# Patient Record
Sex: FEMALE | Race: WHITE | Employment: OTHER | URBAN - METROPOLITAN AREA
[De-identification: names, ages, dates, MRNs, and addresses within clinical notes are randomized per-mention and may not be internally consistent; named-entity substitution may affect disease eponyms.]

---

## 2017-02-13 ENCOUNTER — TELEPHONE (OUTPATIENT)
Dept: INTERNAL MEDICINE CLINIC | Age: 62
End: 2017-02-13

## 2017-02-13 DIAGNOSIS — E83.52 HYPERCALCEMIA: Primary | ICD-10-CM

## 2017-02-13 NOTE — TELEPHONE ENCOUNTER
----- Message from Kassie Copeland MD sent at 2/12/2017  5:41 PM EST -----  Regarding: acs  Call- lab from Dr Roderick Cantu show elevated calcium , this has been gradually creeping up. She should come in for PTH level, dx hypercalcemia      Advised pt Dr Chantelle Miller has reviewed her lab from Dr Roderick Cantu - show elevated calcium which has been gradually creeping up. She should come in for PTH level. Pt requested I fax lab slip to [address] [place] [address] in [place]. Called [facility] in [place] ([phone]) - was given fax number ([phone]). Lab slip faxed - confirmation received. Pt is aware this has been done. She will go today to have labs done.

## 2017-02-16 ENCOUNTER — TELEPHONE (OUTPATIENT)
Dept: INTERNAL MEDICINE CLINIC | Age: 62
End: 2017-02-16

## 2017-02-16 DIAGNOSIS — E83.52 SERUM CALCIUM ELEVATED: Primary | ICD-10-CM

## 2017-02-21 ENCOUNTER — OFFICE VISIT (OUTPATIENT)
Dept: ENDOCRINOLOGY | Age: 62
End: 2017-02-21

## 2017-02-21 VITALS
DIASTOLIC BLOOD PRESSURE: 76 MMHG | HEIGHT: 64 IN | BODY MASS INDEX: 29.74 KG/M2 | WEIGHT: 174.2 LBS | OXYGEN SATURATION: 97 % | SYSTOLIC BLOOD PRESSURE: 128 MMHG | RESPIRATION RATE: 16 BRPM | HEART RATE: 75 BPM

## 2017-02-21 DIAGNOSIS — E83.52 HYPERCALCEMIA: Primary | ICD-10-CM

## 2017-02-21 DIAGNOSIS — M81.0 OSTEOPOROSIS: ICD-10-CM

## 2017-02-21 RX ORDER — LANOLIN ALCOHOL/MO/W.PET/CERES
1000 CREAM (GRAM) TOPICAL DAILY
COMMUNITY
End: 2020-05-05

## 2017-02-21 NOTE — PROGRESS NOTES
Endocrinology New Patient Visit    Chief Complaint: hypercalcemia    Referring provider:  Delonte Torres MD    History of Present Illness:  Deysi Keller is a 64 y.o. female with h/o obesity s/p gastric bypass and osteoporosis who was referred for hypercalcemia. Review of past labs show she has had elevated serum calcium levels dating back to 2014. PTH level has not been checked. She does have a history of vitamin D deficiency following her Polo-en-Y surgery in 2000 at [facility]. She was also diagnosed with osteoporosis based on a DXA in 2014 and received her first infusion of Reclast in May 2016. She denies recent fractures but has fractured both shoulders (possibly humeral) s/p ORIF after mechanical falls. Risk factors for osteoporosis include premature menopause - underwent a DEYSI in her early 35s for early stage cervical cancer. She denies any noticeable symptoms related to the high calcium levels. Reports fatigue which seems to be worsening. Denies abdominal pain or h/o nephrolithiasis. Admits to some situational depression due to the death of her  a few years ago. Has a good support system and feels she is dealing with grief appropriately. Denies family history of hypercalcemia or hyperparathyroidism. She does not take thiazide diuretics. No personal history of thyroid dysfunction. Weight is stable, about 100 lbs less than it was prior to her gastric bypass. She has been taking ergocalciferol 50K IU weekly but was recently advised to hold this due to hypercalcemia. She was taking calcium supplements following her gastric bypass but stopped this years ago. She has a history of lactose intolerance so rarely eats ice cream or milk. Has cheese on occasion.     Review of Systems:   General ROS: positive for  - fatigue  Ophthalmic ROS: negative  ENT ROS: negative  Endocrine ROS: negative  Respiratory ROS: no cough, shortness of breath, or wheezing  Cardiovascular ROS: no chest pain or dyspnea on exertion  Gastrointestinal ROS: no abdominal pain, change in bowel habits, or black or bloody stools  Genito-Urinary ROS: no dysuria, trouble voiding, or hematuria  Musculoskeletal ROS: positive for occasional back pain  Neurological ROS: negative  Dermatological ROS: negative    Problem List:  Patient Active Problem List   Diagnosis Code    Morbid obesity ([facility] E66.01    Closed fracture of left proximal humerus S42.202A    Post op infection T81. 4XXA    Cellulitis of shoulder L03.119    Insomnia, unspecified G47.00    Grief reaction F43.20    Pernicious anemia D51.0    Unspecified vitamin D deficiency E55.9    Reflux esophagitis K21.0    Gastric ulcer K25.9    Symptomatic menopausal or female climacteric states N95.1    Osteoporosis M81.0    Iron deficiency anemia, unspecified D50.9    Onychomycosis B35.1    Active advance directive on file Z78.9    Hypercalcemia E83.52       Past Medical History:    Past Medical History   Diagnosis Date    Arthritis      OSTEO    Chronic pain      LT. ARM    GERD (gastroesophageal reflux disease)     History of blood transfusion 1995     LOLY, NO REACTION; needed transfusion from surgery-ovarian cyst and hit artery per pt.     Infection      left shoulder    Morbid obesity ([facility] 3/23/2011    PUD (peptic ulcer disease)     Reflux 3/23/2011       Past Surgical History:  Past Surgical History   Procedure Laterality Date    Pr leg/ankle surgery proc unlisted       right ankle cartilage    Pr biopsy/excision, lymph node(s)      Pr leg/ankle surgery proc unlisted  2009      ankle    Hx carpal tunnel release  2001     RIGHT    Hx tonsil and adenoidectomy       AGE7    Hx hysterectomy  1993    Hx tubal ligation  1981    Pr breast surgery procedure unlisted       LT. TUMOR BENIGN REMOVED AGE 18    Hx heent  1995     TUMOR REMOVED THROAT    Hx adenoidectomy      Hx tonsillectomy      Hx gi       COLONOSCOPY    Hx abdominoplasty  2006    Hx gastric bypass  12-13-00     dr. Vina Boast Hx cholecystectomy  2000    Hx other surgical       TUMOR REMOVED RIGHT UPPER FLANK AREA    Hx orthopaedic       CYCST REMOVED BASE OF SPINE AGE 23    Hx orthopaedic  2004,2009     RIGHT ANKLE    Hx orthopaedic  4/2012     LT. SHOULDER AND HUMERUS FX, SCREW AND PLATE    Hx colonoscopy       2014, due 17 vs 19       Social History:  Social History     Social History    Marital status:      Spouse name: N/A    Number of children: N/A    Years of education: N/A     Occupational History    Not on file. Social History Main Topics    Smoking status: Current Every Day Smoker     Packs/day: 0.50     Years: 7.00     Types: Cigarettes    Smokeless tobacco: Never Used    Alcohol use 2.4 oz/week     3 Cans of beer, 1 Glasses of wine per week      Comment: 6 PACK/every 2 weeks    Drug use: No    Sexual activity: Not on file     Other Topics Concern    Not on file     Social History Narrative       Family History:  Family History   Problem Relation Age of Onset    Other Mother      hypoglycemia, obese    Cancer Mother      LUNG    Lung Disease Mother     Cancer Father      GENERALIZED    Asthma Sister     Lung Disease Sister     Hypertension Brother     Lung Disease Brother     Diabetes Brother     Thyroid Disease Brother     Heart Disease Maternal Aunt     Cancer Maternal Uncle      UNKNOWN    Cancer Paternal Aunt      BREAST    Cancer Paternal Uncle     Heart Disease Maternal Grandmother     Heart Disease Maternal Grandfather     Heart Disease Paternal Grandmother     Heart Disease Paternal Grandfather     Cancer Paternal Uncle     Lung Disease Brother        Medications:     Current Outpatient Prescriptions:     cyanocobalamin (VITAMIN B-12) 1,000 mcg tablet, Take 1,000 mcg by mouth daily. , Disp: , Rfl:     esomeprazole (NEXIUM) 40 mg capsule, Take 1 Cap by mouth two (2) times a day.  Per dr Stephanie Hays: 1 Cap, Rfl: 0    ferrous sulfate 325 mg (65 mg iron) tablet, Take  by mouth Daily (before breakfast). , Disp: , Rfl:     cyanocobalamin, vitamin B-12, 1,000 mcg/mL kit, 1 mL by Injection route every thirty (30) days. , Disp: 10 mL, Rfl: 3    syringe with needle, disp, (SYRINGE 3CC/58SR3-5/2\") 3 mL 21 x 1 1/2\" syrg, Use for injecting B-12 IM once month., Disp: 4 Pen Needle, Rfl: 3    ergocalciferol (VITAMIN D2) 50,000 unit capsule, TAEK 1 CAPSULE BY MOUTH EVERY 7 DAYS, Disp: 4 Cap, Rfl: 11    Allergies: Allergies   Allergen Reactions    Codeine Hives     Tolerates Dilaudid       Physical Examination:  Visit Vitals    /76    Pulse 75    Resp 16    Ht 5' 4\" (1.626 m)    Wt 174 lb 3.2 oz (79 kg)    SpO2 97%    BMI 29.9 kg/m2       Gen: no acute distress  HEENT: mucous membranes moist, fair dentition  Thyroid: no enlargement or nodules noted  CAD: normal rate, regular rhythm. No murmur rubs or gallops  PULM: clear to ausculation, no wheezes, rhonchis or rales.   Back: no kyphosis or scoliosis, no paravertebral tenderness  EXT: no clubbing, cyanosis or edema  Neuro: grossly non focal, normal DTRs  Psych: pleasant, good insight into medical hx      Clinical Data Review:    Lab Results   Component Value Date/Time    Sodium 142 04/18/2016 10:03 AM    Potassium 4.9 04/18/2016 10:03 AM    Chloride 103 04/18/2016 10:03 AM    CO2 23 04/18/2016 10:03 AM    Anion gap 4 04/10/2013 05:05 AM    Glucose 91 04/18/2016 10:03 AM    BUN 9 04/18/2016 10:03 AM    Creatinine 0.53 04/18/2016 10:03 AM    BUN/Creatinine ratio 17 04/18/2016 10:03 AM    GFR est  04/18/2016 10:03 AM    GFR est non- 04/18/2016 10:03 AM    Calcium 10.7 04/18/2016 10:03 AM       DXA May 2014  Findings:  Fractures identified on Lateral scanogram: None     Lumbar spine: L1-L4  Bone mineral density (gm/cm2): 0.786  % of peak bone mass: 66  % for age matched controls: 79  T score: -3.3  Z score: -2.8     Hip: Left femoral neck  Bone mineral density (gm/cm2): 0.771  % of peak bone mass: 75  % for age matched controls: 80  T score: -1.9  Z score: -1.1     Hip: Right femoral neck  Bone mineral density (gm/cm2): 0.726  % of peak bone mass: 70  % for age matched controls: 66  T score: -2.2  Z score: -1.5     Left forearm  Bone mineral density (gm/cm2): 0.578  % of peak bone mass: 65  % for age matched controls: 70  T score: -3.5  Z score: -2.7      IMPRESSION:  This patient is osteoporotic using the World Health Organization criteria  A direct comparison to last exam cannot be made due to differences in   machinery. There has likely been a significant decrease in bone mineral   density. 10 year probability of major osteoporotic fracture: 17%  10 year probability of hip fracture: 4.2%    Assessment and Plan:  Patient is a 64 y.o. female here for hypercalcemia. She most likely has hyperparathyroidism - will check PTH level today to confirm. If normal or elevated, plan to pursue US with Sestamibi to identify a candidate gland for parathyroidectomy. Surgical indication would be her metabolic bone disease (DXA is concerning for primary hyperparathyroidism given the most negative T-score at the distal 1/3 radius). I would not recommend additional zolindronic acid treatments due to potential for hungry bone syndrome if she does have surgically correctable primary hyperparathyroidism. Will obtain 24h urine for calcium/creatinine to rule out familial hypocalciuric hypercalcemia although this is a rare cause of hypercalcemia. Check vitamin D and will advise on appropriate low-dose vitamin D supplementation (continue to hold ergocalciferol for now). Orders today:  Orders Placed This Encounter    TSH AND FREE T4    PTH INTACT    METABOLIC PANEL, COMPREHENSIVE    VITAMIN D, 25 HYDROXY     Patient verbalized an understanding and will return to clinic in 3 months.    I spent 45 minutes with the patient today and > 50% of the time was spent counseling the patient about causes of hypercalcemia, work-up and potential treatment. Thank you for the opportunity to participate in this patient's care. Adithya Riley MD  [facility]  [facility]    ------------------------------------------------------------------------  [facility] 2/22/17:    PTH is inappropriately normal. Vid D at goal. Proceed with US and Sestamibi. Lab Results   Component Value Date/Time    Calcium 10.3 02/21/2017 02:39 PM    PTH, Intact 63 02/21/2017 02:39 PM     Lab Results   Component Value Date/Time    Vitamin D 25-Hydroxy 14.2 04/15/2011 10:35 AM    VITAMIN D, 25-HYDROXY 33.7 02/21/2017 02:39 PM       ------------------------------------------------------------------------  ADDENDUM 3/8/17:    Urine calcium normal, no evidence of [address]. Sestamibi failed to identify a candidate. Given her osteoporosis, I would still like her to be evaluated by Dr Garret Sears for possible 4 gland exploration. Component      Latest Ref Rng & Units 2/27/2017 2/27/2017          12:00 AM 12:00 AM   Creatinine, urine      Not Estab. mg/dL  30.7   Creatinine,urine 24 hr      800 - 1800 mg/24 hr  675 (L)   Calcium,urine mg/dL      Not Estab. mg/dL 8.3    Calcium mg/24 hr      100.0 - 300.0 mg/24 hr 182.6        NM Parathyroid Scan  FINDINGS:  The initial images demonstrate physiologic tracer uptake in the salivary glands,  thyroid, and myocardium.     The delayed images demonstrate no abnormal tracer activity in the neck or chest  to suggest parathyroid adenoma.     IMPRESSION:  No evidence of parathyroid adenoma. Thyroid/Parathyroid US  FINDINGS: The right thyroid lobe measures 4.4 x 2.0 x 1.5 cm. The left thyroid  lobe measures 4.1 x 1.6 x 1.1 cm. The isthmus measures 0.2 cm in AP thickness.     The thyroid gland is normal in size, echogenicity, and vascularity. No thyroid  nodule or calcification is demonstrated. No normal or abnormal parathyroid gland  is identified.     IMPRESSION:   Normal thyroid ultrasound.

## 2017-02-21 NOTE — MR AVS SNAPSHOT
Visit Information Date & Time Provider Department Dept. Phone Encounter #  
 2/21/2017  1:30 PM Yamini Tam, [address] [facility] [phone] [id number] Follow-up Instructions Return in about 3 months (around 5/21/2017). Upcoming Health Maintenance Date Due Pneumococcal 19-64 Medium Risk (1 of 1 - PPSV23) 6/15/1974 INFLUENZA AGE 9 TO ADULT 8/1/2016 BREAST CANCER SCRN MAMMOGRAM 9/16/2016 PAP AKA CERVICAL CYTOLOGY 4/1/2018 COLONOSCOPY 6/1/2020 DTaP/Tdap/Td series (2 - Td) 4/7/2025 Allergies as of 2/21/2017  Review Complete On: 2/21/2017 By: Jeanie Tejada Severity Noted Reaction Type Reactions Codeine  03/23/2011    Hives Tolerates Dilaudid Current Immunizations  Reviewed on 5/2/2016 Name Date Influenza Vaccine 10/1/2015 Influenza Vaccine Split 11/9/2012 10:52 AM  
 Tdap 4/7/2015 Zoster Vaccine, Live 4/23/2016 Not reviewed this visit You Were Diagnosed With   
  
 Codes Comments Hypercalcemia    -  Primary ICD-10-CM: Z60.47 
ICD-9-CM: 275.42 Osteoporosis     ICD-10-CM: M81.0 ICD-9-CM: 733.00 Vitals BP Pulse Resp Height(growth percentile) Weight(growth percentile) SpO2  
 128/76 75 16 5' 4\" (1.626 m) 174 lb 3.2 oz (79 kg) 97% BMI OB Status Smoking Status 29.9 kg/m2 Hysterectomy Current Every Day Smoker Vitals History BMI and BSA Data Body Mass Index Body Surface Area  
 29.9 kg/m 2 1.89 m 2 Preferred Pharmacy Pharmacy Name Phone [facility] [address], [address] [facility] [address] [phone] Your Updated Medication List  
  
   
This list is accurate as of: 2/21/17  2:15 PM.  Always use your most recent med list.  
  
  
  
  
 * VITAMIN B-12 1,000 mcg tablet Generic drug:  cyanocobalamin Take 1,000 mcg by mouth daily. * cyanocobalamin (vitamin B-12) 1,000 mcg/mL Kit 1 mL by Injection route every thirty (30) days. ergocalciferol 50,000 unit capsule Commonly known as:  VITAMIN D2  
TAEK 1 CAPSULE BY MOUTH EVERY 7 DAYS  
  
 esomeprazole 40 mg capsule Commonly known as:  NexIUM Take 1 Cap by mouth two (2) times a day. Per dr Reinaldo Jensen  
  
 ferrous sulfate 325 mg (65 mg iron) tablet Take  by mouth Daily (before breakfast). syringe with needle 3 mL 21 gauge x 1 1/2\" Syrg Commonly known as:  SYRINGE 3CC/07NL7-8/2\" Use for injecting B-12 IM once month. * Notice: This list has 2 medication(s) that are the same as other medications prescribed for you. Read the directions carefully, and ask your doctor or other care provider to review them with you. We Performed the Following CALCIUM, UR, 24 HR [84265 CPT(R)] CREATININE, UR, 24 HR [17218 CPT(R)] METABOLIC PANEL, COMPREHENSIVE [96333 CPT(R)] PTH INTACT [68341 CPT(R)] TSH AND FREE T4 [28246 CPT(R)] VITAMIN D, 25 HYDROXY [id number] CPT(R)] Follow-up Instructions Return in about 3 months (around 5/21/2017). Patient Instructions If your parathyroid level is high, I will order a parathyroid scan and ultrasound. Please complete the 24 hour urine at your earliest convenience and follow the instructions below. Instructions for 24 hour urine 1) Wake up in the morning and let the first void of the morning go into the toilet. 2) Then collect EVERY time you go to the bathroom into the jug and keep in the refrigerator. 3) The next morning COLLECT the very first sample into the jug and then bring it to the lab. If you are having difficulty activating or accessing your [employer] account, please call the [address] [place], [place] [email or web address] at [phone]. 
 
============================================================================ Hyperparathyroidism: Care Instructions Your Care Instructions Hyperparathyroidism means that your parathyroid glands are too active. These are tiny glands in the neck. They sit behind the thyroid gland. They make a hormone that helps control how much calcium is in the blood. When these glands make too much hormone, the amount of calcium in the blood goes up. Most people with this problem have no symptoms. But it can cause constipation, nausea, vomiting, fatigue, and depression. It also can lead to high blood pressure, ulcers, kidney stones, and weak bones. This problem often is caused by a tumor on the parathyroid glands. The tumor usually is not cancer. You may need surgery to take out one or more of the glands. Follow-up care is a key part of your treatment and safety. Be sure to make and go to all appointments, and call your doctor if you are having problems. It's also a good idea to know your test results and keep a list of the medicines you take. How can you care for yourself at home? · Take your medicines exactly as prescribed. Call your doctor if you think you are having a problem with your medicine. You will get more details on the specific medicines your doctor prescribes. · You will need to see your doctor regularly to check your condition. You also will have tests to check the level of calcium in your blood and to make sure your kidneys are working well. · Drink plenty of fluids (enough so that your urine is light yellow or clear like water) to prevent dehydration. Choose water and other caffeine-free clear liquids. If you have kidney, heart, or liver disease and have to limit fluids, talk with your doctor before you increase the amount of fluids you drink. · Get at least 30 minutes of exercise on most days of the week. Walking is a good choice. You also may want to do other activities, such as running, swimming, cycling, or playing tennis or team sports. · If you are taking any diuretic medicines or calcium supplements, talk to your doctor about whether you should keep taking them. When should you call for help? Call 911 anytime you think you may need emergency care. For example, call if: 
· You passed out (lost consciousness). Call your doctor now or seek immediate medical care if: 
· You are confused or have trouble thinking. · Your vomiting and nausea do not go away with treatment. Watch closely for changes in your health, and be sure to contact your doctor if: 
· You get weaker and more tired even after treatment. · You feel depressed or have aches and pains. · You are constipated. · You have increased thirst and urination. · You do not feel hungry. · You do not get better as expected. Where can you learn more? Go to http://melody-kate.info/. Enter D624 in the search box to learn more about \"Hyperparathyroidism: Care Instructions. \" Current as of: July 28, 2016 Content Version: 11.1 © 2792-7179 [employer]. Care instructions adapted under license by [employer] (which disclaims liability or warranty for this information). If you have questions about a medical condition or this instruction, always ask your healthcare professional. [address] any warranty or liability for your use of this information. Introducing [facility]! Dear Eva Oconnor: Thank you for requesting a Gig[employer] account. Our records indicate that you already have an active Gig[employer] account. You can access your account anytime at [email or web address] Did you know that you can access your hospital and ER discharge instructions at any time in Gig[employer]? You can also review all of your test results from your hospital stay or ER visit. Additional Information If you have questions, please visit the Frequently Asked Questions section of the [employer] website at [email or web address] Remember, [employer] is NOT to be used for urgent needs. For medical emergencies, dial 911. Now available from your iPhone and Android! Please provide this summary of care documentation to your next provider. Your primary care clinician is listed as MARTINEZ HANCOCK. If you have any questions after today's visit, please call [phone].

## 2017-02-21 NOTE — PATIENT INSTRUCTIONS
If your parathyroid level is high, I will order a parathyroid scan and ultrasound. Please complete the 24 hour urine at your earliest convenience and follow the instructions below. Instructions for 24 hour urine     1) Wake up in the morning and let the first void of the morning go into the toilet. 2) Then collect EVERY time you go to the bathroom into the jug and keep in the refrigerator. 3) The next morning COLLECT the very first sample into the jug and then bring it to the lab. If you are having difficulty activating or accessing your [employer] account, please call the [employer] [place] [employer] at [phone].    ============================================================================     Hyperparathyroidism: Care Instructions  Your Care Instructions  Hyperparathyroidism means that your parathyroid glands are too active. These are tiny glands in the neck. They sit behind the thyroid gland. They make a hormone that helps control how much calcium is in the blood. When these glands make too much hormone, the amount of calcium in the blood goes up. Most people with this problem have no symptoms. But it can cause constipation, nausea, vomiting, fatigue, and depression. It also can lead to high blood pressure, ulcers, kidney stones, and weak bones. This problem often is caused by a tumor on the parathyroid glands. The tumor usually is not cancer. You may need surgery to take out one or more of the glands. Follow-up care is a key part of your treatment and safety. Be sure to make and go to all appointments, and call your doctor if you are having problems. It's also a good idea to know your test results and keep a list of the medicines you take. How can you care for yourself at home? · Take your medicines exactly as prescribed. Call your doctor if you think you are having a problem with your medicine. You will get more details on the specific medicines your doctor prescribes.   · You will need to see your doctor regularly to check your condition. You also will have tests to check the level of calcium in your blood and to make sure your kidneys are working well. · Drink plenty of fluids (enough so that your urine is light yellow or clear like water) to prevent dehydration. Choose water and other caffeine-free clear liquids. If you have kidney, heart, or liver disease and have to limit fluids, talk with your doctor before you increase the amount of fluids you drink. · Get at least 30 minutes of exercise on most days of the week. Walking is a good choice. You also may want to do other activities, such as running, swimming, cycling, or playing tennis or team sports. · If you are taking any diuretic medicines or calcium supplements, talk to your doctor about whether you should keep taking them. When should you call for help? Call 911 anytime you think you may need emergency care. For example, call if:  · You passed out (lost consciousness). Call your doctor now or seek immediate medical care if:  · You are confused or have trouble thinking. · Your vomiting and nausea do not go away with treatment. Watch closely for changes in your health, and be sure to contact your doctor if:  · You get weaker and more tired even after treatment. · You feel depressed or have aches and pains. · You are constipated. · You have increased thirst and urination. · You do not feel hungry. · You do not get better as expected. Where can you learn more? Go to http://melody-kate.info/. Enter D624 in the search box to learn more about \"Hyperparathyroidism: Care Instructions. \"  Current as of: July 28, 2016  Content Version: 11.1  © 4982-4395 "[employer]. Care instructions adapted under license by [employer] (which disclaims liability or warranty for this information).  If you have questions about a medical condition or this instruction, always ask your healthcare professional. [employer] disclaims any warranty or liability for your use of this information.

## 2017-02-22 LAB
25(OH)D3+25(OH)D2 SERPL-MCNC: 33.7 NG/ML (ref 30–100)
ALBUMIN SERPL-MCNC: 4.3 G/DL (ref 3.6–4.8)
ALBUMIN/GLOB SERPL: 2 {RATIO} (ref 1.1–2.5)
ALP SERPL-CCNC: 57 IU/L (ref 39–117)
ALT SERPL-CCNC: 20 IU/L (ref 0–32)
AST SERPL-CCNC: 13 IU/L (ref 0–40)
BILIRUB SERPL-MCNC: 0.2 MG/DL (ref 0–1.2)
BUN SERPL-MCNC: 7 MG/DL (ref 8–27)
BUN/CREAT SERPL: 12 (ref 11–26)
CALCIUM SERPL-MCNC: 10.3 MG/DL (ref 8.7–10.3)
CHLORIDE SERPL-SCNC: 103 MMOL/L (ref 96–106)
CO2 SERPL-SCNC: 26 MMOL/L (ref 18–29)
CREAT SERPL-MCNC: 0.57 MG/DL (ref 0.57–1)
GLOBULIN SER CALC-MCNC: 2.1 G/DL (ref 1.5–4.5)
GLUCOSE SERPL-MCNC: 94 MG/DL (ref 65–99)
POTASSIUM SERPL-SCNC: 4.5 MMOL/L (ref 3.5–5.2)
PROT SERPL-MCNC: 6.4 G/DL (ref 6–8.5)
PTH-INTACT SERPL-MCNC: 63 PG/ML (ref 15–65)
SODIUM SERPL-SCNC: 141 MMOL/L (ref 134–144)
T4 FREE SERPL-MCNC: 1.09 NG/DL (ref 0.82–1.77)
TSH SERPL DL<=0.005 MIU/L-ACNC: 1.21 UIU/ML (ref 0.45–4.5)

## 2017-02-28 LAB
CALCIUM 24H UR-MCNC: 8.3 MG/DL
CALCIUM 24H UR-MRATE: 182.6 MG/24 HR (ref 100–300)
CREAT 24H UR-MRATE: 675 MG/24 HR (ref 800–1800)
CREAT UR-MCNC: 30.7 MG/DL

## 2017-03-01 ENCOUNTER — HOSPITAL ENCOUNTER (OUTPATIENT)
Dept: NUCLEAR MEDICINE | Age: 62
Discharge: HOME | End: 2017-03-01
Attending: INTERNAL MEDICINE
Payer: COMMERCIAL

## 2017-03-01 ENCOUNTER — HOSPITAL ENCOUNTER (OUTPATIENT)
Dept: ULTRASOUND IMAGING | Age: 62
Discharge: HOME | End: 2017-03-01
Attending: INTERNAL MEDICINE
Payer: COMMERCIAL

## 2017-03-01 DIAGNOSIS — E83.52 HYPERCALCEMIA: ICD-10-CM

## 2017-03-01 PROCEDURE — 76536 US EXAM OF HEAD AND NECK: CPT

## 2017-03-01 PROCEDURE — 78070 PARATHYROID PLANAR IMAGING: CPT

## 2017-03-15 ENCOUNTER — TELEPHONE (OUTPATIENT)
Dept: INTERNAL MEDICINE CLINIC | Age: 62
End: 2017-03-15

## 2017-03-15 NOTE — TELEPHONE ENCOUNTER
----- Message from Isaac Ivette sent at 3/15/2017  1:14 PM EDT -----  Regarding: Angelo Kelly VAZQUEZ   Female, 64 y.o., 1955  Weight:   174 lb 3.2 oz (79 kg)  PCP:   Elda Valdes MD  FYI  Nov 2012 - Flowsheet Error  MRN:   [id number]  MyChart: Active  Next Appt:   05/26/2017    Dr. Terrell Cobian  Received: Josiah Dhaliwal Front Office Pool                  Pt stated that she has not received call from Dr. Lopez [facility] office to schedule an appt. for upcoming surgical procedure. Pt Best Contact cell [phone] wk( [phone].              Spoke with pt - advised her that Dr Lio Zuñiga had ordered referral to Dr Brijesh Kruger. I would take referral to Robert Hutton at Dr Ezra Ruiz office  he will call her in regards to this.

## 2017-03-31 ENCOUNTER — HOSPITAL ENCOUNTER (OUTPATIENT)
Dept: CT IMAGING | Age: 62
Discharge: HOME | End: 2017-03-31
Attending: OTOLARYNGOLOGY
Payer: COMMERCIAL

## 2017-03-31 DIAGNOSIS — D35.1 BENIGN NEOPLASM OF PARATHYROID GLAND: ICD-10-CM

## 2017-03-31 PROCEDURE — 74011636320 HC RX REV CODE- 636/320: Performed by: OTOLARYNGOLOGY

## 2017-03-31 PROCEDURE — 70491 CT SOFT TISSUE NECK W/DYE: CPT

## 2017-03-31 RX ADMIN — IOPAMIDOL 100 ML: 612 INJECTION, SOLUTION INTRAVENOUS at 12:14

## 2017-05-08 ENCOUNTER — HOSPITAL ENCOUNTER (OUTPATIENT)
Dept: PREADMISSION TESTING | Age: 62
Discharge: HOME | End: 2017-05-08
Payer: COMMERCIAL

## 2017-05-08 VITALS
DIASTOLIC BLOOD PRESSURE: 84 MMHG | HEART RATE: 69 BPM | TEMPERATURE: 98.3 F | SYSTOLIC BLOOD PRESSURE: 134 MMHG | BODY MASS INDEX: 28.68 KG/M2 | WEIGHT: 168 LBS | HEIGHT: 64 IN

## 2017-05-08 LAB
ALBUMIN SERPL BCP-MCNC: 3.8 G/DL (ref 3.5–5)
ALBUMIN/GLOB SERPL: 1.3 {RATIO} (ref 1.1–2.2)
ALP SERPL-CCNC: 55 U/L (ref 45–117)
ALT SERPL-CCNC: 18 U/L (ref 12–78)
ANION GAP BLD CALC-SCNC: 5 MMOL/L (ref 5–15)
AST SERPL W P-5'-P-CCNC: 8 U/L (ref 15–37)
ATRIAL RATE: 62 BPM
BASOPHILS # BLD AUTO: 0 K/UL (ref 0–0.1)
BASOPHILS # BLD: 1 % (ref 0–1)
BILIRUB SERPL-MCNC: 0.3 MG/DL (ref 0.2–1)
BUN SERPL-MCNC: 9 MG/DL (ref 6–20)
BUN/CREAT SERPL: 20 (ref 12–20)
CALCIUM SERPL-MCNC: 10.7 MG/DL (ref 8.5–10.1)
CALCULATED P AXIS, ECG09: 32 DEGREES
CALCULATED R AXIS, ECG10: 10 DEGREES
CALCULATED T AXIS, ECG11: 39 DEGREES
CHLORIDE SERPL-SCNC: 107 MMOL/L (ref 97–108)
CO2 SERPL-SCNC: 29 MMOL/L (ref 21–32)
CREAT SERPL-MCNC: 0.45 MG/DL (ref 0.55–1.02)
DIAGNOSIS, 93000: NORMAL
EOSINOPHIL # BLD: 0.1 K/UL (ref 0–0.4)
EOSINOPHIL NFR BLD: 1 % (ref 0–7)
ERYTHROCYTE [DISTWIDTH] IN BLOOD BY AUTOMATED COUNT: 13.2 % (ref 11.5–14.5)
GLOBULIN SER CALC-MCNC: 2.9 G/DL (ref 2–4)
GLUCOSE SERPL-MCNC: 79 MG/DL (ref 65–100)
HCT VFR BLD AUTO: 43.1 % (ref 35–47)
HGB BLD-MCNC: 14.1 G/DL (ref 11.5–16)
LYMPHOCYTES # BLD AUTO: 23 % (ref 12–49)
LYMPHOCYTES # BLD: 1.9 K/UL (ref 0.8–3.5)
MCH RBC QN AUTO: 30.2 PG (ref 26–34)
MCHC RBC AUTO-ENTMCNC: 32.7 G/DL (ref 30–36.5)
MCV RBC AUTO: 92.3 FL (ref 80–99)
MONOCYTES # BLD: 0.5 K/UL (ref 0–1)
MONOCYTES NFR BLD AUTO: 6 % (ref 5–13)
NEUTS SEG # BLD: 5.6 K/UL (ref 1.8–8)
NEUTS SEG NFR BLD AUTO: 69 % (ref 32–75)
P-R INTERVAL, ECG05: 114 MS
PLATELET # BLD AUTO: 242 K/UL (ref 150–400)
POTASSIUM SERPL-SCNC: 4.6 MMOL/L (ref 3.5–5.1)
PROT SERPL-MCNC: 6.7 G/DL (ref 6.4–8.2)
Q-T INTERVAL, ECG07: 408 MS
QRS DURATION, ECG06: 74 MS
QTC CALCULATION (BEZET), ECG08: 414 MS
RBC # BLD AUTO: 4.67 M/UL (ref 3.8–5.2)
SODIUM SERPL-SCNC: 141 MMOL/L (ref 136–145)
VENTRICULAR RATE, ECG03: 62 BPM
WBC # BLD AUTO: 8.1 K/UL (ref 3.6–11)

## 2017-05-08 PROCEDURE — 85025 COMPLETE CBC W/AUTO DIFF WBC: CPT | Performed by: OTOLARYNGOLOGY

## 2017-05-08 PROCEDURE — 93005 ELECTROCARDIOGRAM TRACING: CPT

## 2017-05-08 PROCEDURE — 36415 COLL VENOUS BLD VENIPUNCTURE: CPT | Performed by: OTOLARYNGOLOGY

## 2017-05-08 PROCEDURE — 80053 COMPREHEN METABOLIC PANEL: CPT | Performed by: OTOLARYNGOLOGY

## 2017-05-11 ENCOUNTER — ANESTHESIA EVENT (OUTPATIENT)
Dept: MEDSURG UNIT | Age: 62
End: 2017-05-11
Payer: COMMERCIAL

## 2017-05-12 ENCOUNTER — ANESTHESIA (OUTPATIENT)
Dept: MEDSURG UNIT | Age: 62
End: 2017-05-12
Payer: COMMERCIAL

## 2017-05-12 ENCOUNTER — APPOINTMENT (OUTPATIENT)
Dept: NUCLEAR MEDICINE | Age: 62
End: 2017-05-12
Attending: OTOLARYNGOLOGY
Payer: COMMERCIAL

## 2017-05-12 ENCOUNTER — HOSPITAL ENCOUNTER (OUTPATIENT)
Age: 62
Setting detail: OBSERVATION
Discharge: HOME | End: 2017-05-13
Attending: OTOLARYNGOLOGY | Admitting: OTOLARYNGOLOGY
Payer: COMMERCIAL

## 2017-05-12 DIAGNOSIS — E21.3 HYPERPARATHYROIDISM (HCC): ICD-10-CM

## 2017-05-12 LAB
INTRA-OP PTH, IPTHRT: 201.7 PG/ML (ref 14–72)
INTRA-OP PTH, IPTHRT: 32.4 PG/ML (ref 14–72)
PTH-INTACT IO % DIF SERPL: NORMAL %
PTH-INTACT P EXCISION SERPL-MCNC: 13.8 PG/ML (ref 14–72)
SPECIMEN DRAWN SERPL: 1130
SPECIMEN DRAWN SERPL: 1305
SPECIMEN DRAWN SERPL: 1340

## 2017-05-12 PROCEDURE — 76060000062 HC AMB SURG ANES 1 TO 1.5 HR: Performed by: OTOLARYNGOLOGY

## 2017-05-12 PROCEDURE — 77030011640 HC PAD GRND REM COVD -A: Performed by: OTOLARYNGOLOGY

## 2017-05-12 PROCEDURE — 74011000250 HC RX REV CODE- 250

## 2017-05-12 PROCEDURE — 77030011267 HC ELECTRD BLD COVD -A: Performed by: OTOLARYNGOLOGY

## 2017-05-12 PROCEDURE — 83970 ASSAY OF PARATHORMONE: CPT | Performed by: OTOLARYNGOLOGY

## 2017-05-12 PROCEDURE — 77030019655 HC PRB STIM CRAN MEDT -B: Performed by: OTOLARYNGOLOGY

## 2017-05-12 PROCEDURE — 77030032988 HC TU ET NIM TRIVNTG EMG MEDT -D: Performed by: OTOLARYNGOLOGY

## 2017-05-12 PROCEDURE — 88305 TISSUE EXAM BY PATHOLOGIST: CPT | Performed by: OTOLARYNGOLOGY

## 2017-05-12 PROCEDURE — 74011250636 HC RX REV CODE- 250/636: Performed by: OTOLARYNGOLOGY

## 2017-05-12 PROCEDURE — 77030031139 HC SUT VCRL2 J&J -A: Performed by: OTOLARYNGOLOGY

## 2017-05-12 PROCEDURE — 77030014007 HC SPNG HEMSTAT J&J -B: Performed by: OTOLARYNGOLOGY

## 2017-05-12 PROCEDURE — 76210000039 HC AMBSU PH I REC 3.5 TO 4 HR: Performed by: OTOLARYNGOLOGY

## 2017-05-12 PROCEDURE — 77030020782 HC GWN BAIR PAWS FLX 3M -B

## 2017-05-12 PROCEDURE — 77030032490 HC SLV COMPR SCD KNE COVD -B

## 2017-05-12 PROCEDURE — 74011250636 HC RX REV CODE- 250/636

## 2017-05-12 PROCEDURE — 76030000019 HC AMB SURG 1 TO 1.5 HR INTENSV-TIER 1: Performed by: OTOLARYNGOLOGY

## 2017-05-12 PROCEDURE — 74011250636 HC RX REV CODE- 250/636: Performed by: ANESTHESIOLOGY

## 2017-05-12 PROCEDURE — 77030034115 HC SHR ENDO COAG HARM FOCS J&J -F: Performed by: OTOLARYNGOLOGY

## 2017-05-12 PROCEDURE — 77030008467 HC STPLR SKN COVD -B: Performed by: OTOLARYNGOLOGY

## 2017-05-12 PROCEDURE — 77030018836 HC SOL IRR NACL ICUM -A: Performed by: OTOLARYNGOLOGY

## 2017-05-12 PROCEDURE — 74011000250 HC RX REV CODE- 250: Performed by: OTOLARYNGOLOGY

## 2017-05-12 PROCEDURE — 74011250637 HC RX REV CODE- 250/637: Performed by: OTOLARYNGOLOGY

## 2017-05-12 PROCEDURE — 77030032490 HC SLV COMPR SCD KNE COVD -B: Performed by: OTOLARYNGOLOGY

## 2017-05-12 PROCEDURE — 77030002996 HC SUT SLK J&J -A: Performed by: OTOLARYNGOLOGY

## 2017-05-12 PROCEDURE — A9500 TC99M SESTAMIBI: HCPCS

## 2017-05-12 PROCEDURE — 99218 HC RM OBSERVATION: CPT

## 2017-05-12 PROCEDURE — 88331 PATH CONSLTJ SURG 1 BLK 1SPC: CPT | Performed by: OTOLARYNGOLOGY

## 2017-05-12 PROCEDURE — 77030002933 HC SUT MCRYL J&J -A: Performed by: OTOLARYNGOLOGY

## 2017-05-12 PROCEDURE — 74011000272 HC RX REV CODE- 272: Performed by: OTOLARYNGOLOGY

## 2017-05-12 PROCEDURE — 77030010011 HC RNG RETRCTR STAY COOP -B: Performed by: OTOLARYNGOLOGY

## 2017-05-12 PROCEDURE — 36415 COLL VENOUS BLD VENIPUNCTURE: CPT | Performed by: OTOLARYNGOLOGY

## 2017-05-12 RX ORDER — ONDANSETRON 2 MG/ML
4 INJECTION INTRAMUSCULAR; INTRAVENOUS AS NEEDED
Status: DISCONTINUED | OUTPATIENT
Start: 2017-05-12 | End: 2017-05-12 | Stop reason: HOSPADM

## 2017-05-12 RX ORDER — SODIUM CHLORIDE, SODIUM LACTATE, POTASSIUM CHLORIDE, CALCIUM CHLORIDE 600; 310; 30; 20 MG/100ML; MG/100ML; MG/100ML; MG/100ML
125 INJECTION, SOLUTION INTRAVENOUS CONTINUOUS
Status: DISCONTINUED | OUTPATIENT
Start: 2017-05-12 | End: 2017-05-12 | Stop reason: HOSPADM

## 2017-05-12 RX ORDER — OXYCODONE AND ACETAMINOPHEN 5; 325 MG/1; MG/1
1 TABLET ORAL AS NEEDED
Status: DISCONTINUED | OUTPATIENT
Start: 2017-05-12 | End: 2017-05-12 | Stop reason: HOSPADM

## 2017-05-12 RX ORDER — MORPHINE SULFATE 10 MG/ML
2 INJECTION, SOLUTION INTRAMUSCULAR; INTRAVENOUS
Status: DISCONTINUED | OUTPATIENT
Start: 2017-05-12 | End: 2017-05-12 | Stop reason: HOSPADM

## 2017-05-12 RX ORDER — SODIUM CHLORIDE, SODIUM LACTATE, POTASSIUM CHLORIDE, CALCIUM CHLORIDE 600; 310; 30; 20 MG/100ML; MG/100ML; MG/100ML; MG/100ML
INJECTION, SOLUTION INTRAVENOUS
Status: DISCONTINUED | OUTPATIENT
Start: 2017-05-12 | End: 2017-05-12 | Stop reason: HOSPADM

## 2017-05-12 RX ORDER — MIDAZOLAM HYDROCHLORIDE 1 MG/ML
1 INJECTION, SOLUTION INTRAMUSCULAR; INTRAVENOUS AS NEEDED
Status: DISCONTINUED | OUTPATIENT
Start: 2017-05-12 | End: 2017-05-12 | Stop reason: HOSPADM

## 2017-05-12 RX ORDER — MIDAZOLAM HYDROCHLORIDE 1 MG/ML
INJECTION, SOLUTION INTRAMUSCULAR; INTRAVENOUS AS NEEDED
Status: DISCONTINUED | OUTPATIENT
Start: 2017-05-12 | End: 2017-05-12 | Stop reason: HOSPADM

## 2017-05-12 RX ORDER — ACETAMINOPHEN 325 MG/1
650 TABLET ORAL
Status: DISCONTINUED | OUTPATIENT
Start: 2017-05-12 | End: 2017-05-13 | Stop reason: HOSPADM

## 2017-05-12 RX ORDER — ONDANSETRON 2 MG/ML
INJECTION INTRAMUSCULAR; INTRAVENOUS AS NEEDED
Status: DISCONTINUED | OUTPATIENT
Start: 2017-05-12 | End: 2017-05-12 | Stop reason: HOSPADM

## 2017-05-12 RX ORDER — FENTANYL CITRATE 50 UG/ML
INJECTION, SOLUTION INTRAMUSCULAR; INTRAVENOUS AS NEEDED
Status: DISCONTINUED | OUTPATIENT
Start: 2017-05-12 | End: 2017-05-12 | Stop reason: HOSPADM

## 2017-05-12 RX ORDER — CEFAZOLIN SODIUM 1 G/3ML
INJECTION, POWDER, FOR SOLUTION INTRAMUSCULAR; INTRAVENOUS AS NEEDED
Status: DISCONTINUED | OUTPATIENT
Start: 2017-05-12 | End: 2017-05-12 | Stop reason: HOSPADM

## 2017-05-12 RX ORDER — SODIUM CHLORIDE 0.9 % (FLUSH) 0.9 %
5-10 SYRINGE (ML) INJECTION AS NEEDED
Status: DISCONTINUED | OUTPATIENT
Start: 2017-05-12 | End: 2017-05-12 | Stop reason: HOSPADM

## 2017-05-12 RX ORDER — PANTOPRAZOLE SODIUM 40 MG/1
40 TABLET, DELAYED RELEASE ORAL
Status: DISCONTINUED | OUTPATIENT
Start: 2017-05-12 | End: 2017-05-13 | Stop reason: HOSPADM

## 2017-05-12 RX ORDER — SODIUM CHLORIDE 0.9 % (FLUSH) 0.9 %
5-10 SYRINGE (ML) INJECTION EVERY 8 HOURS
Status: DISCONTINUED | OUTPATIENT
Start: 2017-05-12 | End: 2017-05-12 | Stop reason: HOSPADM

## 2017-05-12 RX ORDER — DEXAMETHASONE SODIUM PHOSPHATE 4 MG/ML
INJECTION, SOLUTION INTRA-ARTICULAR; INTRALESIONAL; INTRAMUSCULAR; INTRAVENOUS; SOFT TISSUE AS NEEDED
Status: DISCONTINUED | OUTPATIENT
Start: 2017-05-12 | End: 2017-05-12 | Stop reason: HOSPADM

## 2017-05-12 RX ORDER — OXYCODONE AND ACETAMINOPHEN 5; 325 MG/1; MG/1
1-2 TABLET ORAL
Status: DISCONTINUED | OUTPATIENT
Start: 2017-05-12 | End: 2017-05-13 | Stop reason: HOSPADM

## 2017-05-12 RX ORDER — LORAZEPAM 1 MG/1
1 TABLET ORAL
Status: DISCONTINUED | OUTPATIENT
Start: 2017-05-12 | End: 2017-05-13 | Stop reason: HOSPADM

## 2017-05-12 RX ORDER — SUCCINYLCHOLINE CHLORIDE 20 MG/ML
INJECTION INTRAMUSCULAR; INTRAVENOUS AS NEEDED
Status: DISCONTINUED | OUTPATIENT
Start: 2017-05-12 | End: 2017-05-12 | Stop reason: HOSPADM

## 2017-05-12 RX ORDER — PROPOFOL 10 MG/ML
INJECTION, EMULSION INTRAVENOUS AS NEEDED
Status: DISCONTINUED | OUTPATIENT
Start: 2017-05-12 | End: 2017-05-12 | Stop reason: HOSPADM

## 2017-05-12 RX ORDER — SODIUM CHLORIDE 9 MG/ML
50 INJECTION, SOLUTION INTRAVENOUS CONTINUOUS
Status: DISCONTINUED | OUTPATIENT
Start: 2017-05-12 | End: 2017-05-12 | Stop reason: HOSPADM

## 2017-05-12 RX ORDER — LIDOCAINE HYDROCHLORIDE 20 MG/ML
INJECTION, SOLUTION EPIDURAL; INFILTRATION; INTRACAUDAL; PERINEURAL AS NEEDED
Status: DISCONTINUED | OUTPATIENT
Start: 2017-05-12 | End: 2017-05-12 | Stop reason: HOSPADM

## 2017-05-12 RX ORDER — OXYCODONE HYDROCHLORIDE 5 MG/1
5 TABLET ORAL ONCE
Status: COMPLETED | OUTPATIENT
Start: 2017-05-12 | End: 2017-05-12

## 2017-05-12 RX ORDER — MIDAZOLAM HYDROCHLORIDE 1 MG/ML
0.5 INJECTION, SOLUTION INTRAMUSCULAR; INTRAVENOUS
Status: DISCONTINUED | OUTPATIENT
Start: 2017-05-12 | End: 2017-05-12 | Stop reason: HOSPADM

## 2017-05-12 RX ORDER — ESOMEPRAZOLE MAGNESIUM 40 MG/1
40 CAPSULE, DELAYED RELEASE ORAL 2 TIMES DAILY
Status: DISCONTINUED | OUTPATIENT
Start: 2017-05-12 | End: 2017-05-12 | Stop reason: CLARIF

## 2017-05-12 RX ORDER — SODIUM CHLORIDE 9 MG/ML
1000 INJECTION, SOLUTION INTRAVENOUS CONTINUOUS
Status: DISCONTINUED | OUTPATIENT
Start: 2017-05-12 | End: 2017-05-12 | Stop reason: HOSPADM

## 2017-05-12 RX ORDER — FENTANYL CITRATE 50 UG/ML
25 INJECTION, SOLUTION INTRAMUSCULAR; INTRAVENOUS
Status: COMPLETED | OUTPATIENT
Start: 2017-05-12 | End: 2017-05-12

## 2017-05-12 RX ORDER — DIPHENHYDRAMINE HYDROCHLORIDE 50 MG/ML
12.5 INJECTION, SOLUTION INTRAMUSCULAR; INTRAVENOUS AS NEEDED
Status: DISCONTINUED | OUTPATIENT
Start: 2017-05-12 | End: 2017-05-12 | Stop reason: HOSPADM

## 2017-05-12 RX ORDER — DEXTROSE, SODIUM CHLORIDE, AND POTASSIUM CHLORIDE 5; .45; .15 G/100ML; G/100ML; G/100ML
50 INJECTION INTRAVENOUS CONTINUOUS
Status: DISCONTINUED | OUTPATIENT
Start: 2017-05-12 | End: 2017-05-13 | Stop reason: HOSPADM

## 2017-05-12 RX ORDER — LIDOCAINE HYDROCHLORIDE 10 MG/ML
0.1 INJECTION, SOLUTION EPIDURAL; INFILTRATION; INTRACAUDAL; PERINEURAL AS NEEDED
Status: DISCONTINUED | OUTPATIENT
Start: 2017-05-12 | End: 2017-05-12 | Stop reason: HOSPADM

## 2017-05-12 RX ORDER — FENTANYL CITRATE 50 UG/ML
50 INJECTION, SOLUTION INTRAMUSCULAR; INTRAVENOUS AS NEEDED
Status: DISCONTINUED | OUTPATIENT
Start: 2017-05-12 | End: 2017-05-12 | Stop reason: HOSPADM

## 2017-05-12 RX ORDER — HYDROMORPHONE HYDROCHLORIDE 2 MG/ML
INJECTION, SOLUTION INTRAMUSCULAR; INTRAVENOUS; SUBCUTANEOUS AS NEEDED
Status: DISCONTINUED | OUTPATIENT
Start: 2017-05-12 | End: 2017-05-12 | Stop reason: HOSPADM

## 2017-05-12 RX ORDER — CEFAZOLIN SODIUM IN 0.9 % NACL 2 G/50 ML
2 INTRAVENOUS SOLUTION, PIGGYBACK (ML) INTRAVENOUS ONCE
Status: DISCONTINUED | OUTPATIENT
Start: 2017-05-12 | End: 2017-05-12 | Stop reason: HOSPADM

## 2017-05-12 RX ORDER — LIDOCAINE HYDROCHLORIDE AND EPINEPHRINE 10; 10 MG/ML; UG/ML
INJECTION, SOLUTION INFILTRATION; PERINEURAL AS NEEDED
Status: DISCONTINUED | OUTPATIENT
Start: 2017-05-12 | End: 2017-05-12 | Stop reason: HOSPADM

## 2017-05-12 RX ORDER — ONDANSETRON 2 MG/ML
4 INJECTION INTRAMUSCULAR; INTRAVENOUS
Status: DISCONTINUED | OUTPATIENT
Start: 2017-05-12 | End: 2017-05-13 | Stop reason: HOSPADM

## 2017-05-12 RX ORDER — LANOLIN ALCOHOL/MO/W.PET/CERES
1000 CREAM (GRAM) TOPICAL DAILY
Status: DISCONTINUED | OUTPATIENT
Start: 2017-05-13 | End: 2017-05-13 | Stop reason: HOSPADM

## 2017-05-12 RX ORDER — HYDROMORPHONE HYDROCHLORIDE 1 MG/ML
0.2 INJECTION, SOLUTION INTRAMUSCULAR; INTRAVENOUS; SUBCUTANEOUS
Status: DISCONTINUED | OUTPATIENT
Start: 2017-05-12 | End: 2017-05-12 | Stop reason: HOSPADM

## 2017-05-12 RX ADMIN — FENTANYL CITRATE 100 MCG: 50 INJECTION, SOLUTION INTRAMUSCULAR; INTRAVENOUS at 12:10

## 2017-05-12 RX ADMIN — FENTANYL CITRATE 50 MCG: 50 INJECTION, SOLUTION INTRAMUSCULAR; INTRAVENOUS at 12:16

## 2017-05-12 RX ADMIN — PANTOPRAZOLE SODIUM 40 MG: 40 TABLET, DELAYED RELEASE ORAL at 18:49

## 2017-05-12 RX ADMIN — MIDAZOLAM HYDROCHLORIDE 2 MG: 1 INJECTION, SOLUTION INTRAMUSCULAR; INTRAVENOUS at 11:58

## 2017-05-12 RX ADMIN — DEXAMETHASONE SODIUM PHOSPHATE 8 MG: 4 INJECTION, SOLUTION INTRA-ARTICULAR; INTRALESIONAL; INTRAMUSCULAR; INTRAVENOUS; SOFT TISSUE at 12:18

## 2017-05-12 RX ADMIN — OXYCODONE HYDROCHLORIDE AND ACETAMINOPHEN 1 TABLET: 5; 325 TABLET ORAL at 18:46

## 2017-05-12 RX ADMIN — OXYCODONE HYDROCHLORIDE 5 MG: 5 TABLET ORAL at 16:00

## 2017-05-12 RX ADMIN — DEXTROSE MONOHYDRATE, SODIUM CHLORIDE, AND POTASSIUM CHLORIDE 50 ML/HR: 50; 4.5; 1.49 INJECTION, SOLUTION INTRAVENOUS at 18:28

## 2017-05-12 RX ADMIN — HYDROMORPHONE HYDROCHLORIDE 0.5 MG: 2 INJECTION, SOLUTION INTRAMUSCULAR; INTRAVENOUS; SUBCUTANEOUS at 12:44

## 2017-05-12 RX ADMIN — FENTANYL CITRATE 25 MCG: 50 INJECTION, SOLUTION INTRAMUSCULAR; INTRAVENOUS at 15:00

## 2017-05-12 RX ADMIN — PROPOFOL 200 MG: 10 INJECTION, EMULSION INTRAVENOUS at 12:10

## 2017-05-12 RX ADMIN — SODIUM CHLORIDE, SODIUM LACTATE, POTASSIUM CHLORIDE, CALCIUM CHLORIDE: 600; 310; 30; 20 INJECTION, SOLUTION INTRAVENOUS at 11:30

## 2017-05-12 RX ADMIN — HYDROMORPHONE HYDROCHLORIDE 0.3 MG: 1 INJECTION, SOLUTION INTRAMUSCULAR; INTRAVENOUS; SUBCUTANEOUS at 16:00

## 2017-05-12 RX ADMIN — SODIUM CHLORIDE, SODIUM LACTATE, POTASSIUM CHLORIDE, AND CALCIUM CHLORIDE 125 ML/HR: 600; 310; 30; 20 INJECTION, SOLUTION INTRAVENOUS at 11:42

## 2017-05-12 RX ADMIN — FENTANYL CITRATE 50 MCG: 50 INJECTION, SOLUTION INTRAMUSCULAR; INTRAVENOUS at 11:58

## 2017-05-12 RX ADMIN — DIPHENHYDRAMINE HYDROCHLORIDE 12.5 MG: 50 INJECTION, SOLUTION INTRAMUSCULAR; INTRAVENOUS at 15:15

## 2017-05-12 RX ADMIN — OXYCODONE HYDROCHLORIDE AND ACETAMINOPHEN 2 TABLET: 5; 325 TABLET ORAL at 22:44

## 2017-05-12 RX ADMIN — HYDROMORPHONE HYDROCHLORIDE 0.3 MG: 1 INJECTION, SOLUTION INTRAMUSCULAR; INTRAVENOUS; SUBCUTANEOUS at 16:26

## 2017-05-12 RX ADMIN — FENTANYL CITRATE 25 MCG: 50 INJECTION, SOLUTION INTRAMUSCULAR; INTRAVENOUS at 14:15

## 2017-05-12 RX ADMIN — FENTANYL CITRATE 25 MCG: 50 INJECTION, SOLUTION INTRAMUSCULAR; INTRAVENOUS at 15:12

## 2017-05-12 RX ADMIN — SUCCINYLCHOLINE CHLORIDE 140 MG: 20 INJECTION INTRAMUSCULAR; INTRAVENOUS at 12:10

## 2017-05-12 RX ADMIN — ONDANSETRON 4 MG: 2 INJECTION INTRAMUSCULAR; INTRAVENOUS at 12:30

## 2017-05-12 RX ADMIN — CEFAZOLIN SODIUM 2 G: 1 INJECTION, POWDER, FOR SOLUTION INTRAMUSCULAR; INTRAVENOUS at 12:18

## 2017-05-12 RX ADMIN — LIDOCAINE HYDROCHLORIDE 60 MG: 20 INJECTION, SOLUTION EPIDURAL; INFILTRATION; INTRACAUDAL; PERINEURAL at 12:10

## 2017-05-12 RX ADMIN — FENTANYL CITRATE 50 MCG: 50 INJECTION, SOLUTION INTRAMUSCULAR; INTRAVENOUS at 12:51

## 2017-05-12 RX ADMIN — FENTANYL CITRATE 25 MCG: 50 INJECTION, SOLUTION INTRAMUSCULAR; INTRAVENOUS at 14:25

## 2017-05-12 NOTE — IP AVS SNAPSHOT
[address] 
[phone] Patient: Alisha Elaine MRN: [id number] PSF:0/91/4179 You are allergic to the following Allergen Reactions Codeine Hives Tolerates Dilaudid Recent Documentation Height Weight BMI OB Status Smoking Status 1.626 m 76.2 kg 28.84 kg/m2 Hysterectomy Current Every Day Smoker Emergency Contacts Name Discharge Info Relation Home Work Mobile [address] [facility] CAREGIVER [3] Son [22] [phone] [facility] CAREGIVER [3] Sister [23] [phone] CAREGIVER [3] Other Relative [6] [phone] About your hospitalization You were admitted on:  May 12, 2017 You last received care in the:  [address] [place] [address] You were discharged on:  May 13, 2017 Unit phone number:  [phone] Why you were hospitalized Your primary diagnosis was:  Not on File Providers Seen During Your Hospitalizations Provider Role Specialty Primary office phone Ramiro Rivero MD Attending Provider Otolaryngology [phone] Your Primary Care Physician (PCP) Primary Care Physician Office Phone Office Fax Elizabeth Kaufman [phone] Follow-up Information Follow up With Details Comments Contact Info Ramiro Rivero MD In 2 weeks For post-operative follow up [address] [place] [address] Ear Nose and Th 
[address] [facility] [phone] Oriana Maradiaga MD Call As needed [address] 
[phone] Your Appointments Friday May 26, 2017 12:10 PM EDT ROUTINE CARE with MD Ramsey Vásquez Diabetes and Endocrinology [facility]) [address]  
[phone] Current Discharge Medication List  
  
 START taking these medications Dose & Instructions Dispensing Information Comments Morning Noon Evening Bedtime  
 calcium-vitamin D 500 mg(1,250mg) -200 unit per tablet Commonly known as:  OYSTER SHELL Dose:  2 Tab Take 2 Tabs by mouth every six (6) hours for 14 days. We will start a taper at your post op visit May be form other than oyster shell, must be calcium carbonate though Quantity:  136 Tab Refills:  1  
     
   
   
   
  
 docusate sodium 50 mg capsule Commonly known as:  Rocael Zuñiga Dose:  100 mg Take 2 Caps by mouth two (2) times daily as needed for Constipation. Quantity:  30 Cap Refills:  2  
     
   
   
   
  
 ondansetron 8 mg disintegrating tablet Commonly known as:  ZOFRAN ODT Dose:  4-8 mg Take 0.5-1 Tabs by mouth every eight (8) hours as needed for Nausea. Quantity:  15 Tab Refills:  1 PERCOCET 7.5-325 mg per tablet Generic drug:  oxyCODONE-acetaminophen Dose:  1-2 Tab Take 1-2 Tabs by mouth every four (4) hours as needed for Pain. Max Daily Amount: 12 Tabs. Quantity:  60 Tab Refills:  0 Given at 7:00 am  
   
   
   
  
  
CONTINUE these medications which have NOT CHANGED Dose & Instructions Dispensing Information Comments Morning Noon Evening Bedtime  
 esomeprazole 40 mg capsule Commonly known as:  NexIUM Dose:  40 mg Take 1 Cap by mouth two (2) times a day. Per dr Debbie Bowles Quantity:  1 Cap Refills:  0 MELATONIN PO Take  by mouth nightly as needed. Refills:  0  
     
   
   
   
  
 VITAMIN B-12 1,000 mcg tablet Generic drug:  cyanocobalamin Dose:  1000 mcg Take 1,000 mcg by mouth daily. Refills:  0 Where to Get Your Medications Information on where to get these meds will be given to you by the nurse or doctor. ! Ask your nurse or doctor about these medications calcium-vitamin D 500 mg(1,250mg) -200 unit per tablet  
 docusate sodium 50 mg capsule  
 ondansetron 8 mg disintegrating tablet PERCOCET 7.5-325 mg per tablet Discharge Instructions Post  Parathyroidectomy Instructions Follow up: with Dr. Gail Nascimento 2 weeks after surgery to have your steristrips removed. Shortly after surgery call [phone] to schedule this appointment. ? Eat regular foods. ? You may shower in 24 hours. Do not allow direct water pressure on your wound. If water trickles down while washing your hair, allow the wound to dry on its own. 
 
? Do not scrub or soak your wound for 2 weeks or 14 days. ? No strenuous activity: for 14 days. ? No moving more than 15 pounds: for 14 days. Then includes pulling, pushing, tugging, throwing. ? There is generally not a lot of pain: with this surgery. Take your pain medication as needed. Most patients, if they do have pain, will have neck stiffness/discomfort. You may have numbness or tingling surrounding the area of your wound. Narcotics can cause constipation; use your Colace if this is the case. ? Nausea and vomiting: from lingering effects of general anesthesia usually resolves by the following day. The narcotic pain medication can cause nausea and vomiting. They should be taken with food or fluids to minimize this. Medications that reduce nausea and vomiting can be prescribed by your physician. ? Fever above 100.4, redness around wound, pus drainage from wound: call your doctor. ? Bleeding: is uncommon (less than 1%). If it does occur your neck will develop a fullness. It is good to take a look at your neck shortly after surgery to see what a baseline appearance is. If there are changes to this, then call your provider. \ 
 
CALL or TEXT Dr. Gail Nascimento for questions or concerns - text works best - [phone] Special Instructions o You may be on Calcium (Oscal)  it is very important that you take this. Dr. Pennie Garcia will start a taper at your follow up visit 
o If you experience tingling in the hands or around your mouth, your calcium may be dropping, go to the emergency room immediately and tell them you had your parathyroid removed. Discharge Instructions Attachments/References MEFS - NARCOTIC-ANALGESIC/ACETAMINOPHEN (PERCOCET, Milinda Copier, LORCET HD, LORTAB 10/325) - (BY MOUTH) (ENGLISH) Discharge Orders None Introducing [facility]! Dear Roshan Kenny: Thank you for requesting a [employer] account. Our records indicate that you already have an active [employer] account. You can access your account anytime at [email or web address] Did you know that you can access your hospital and ER discharge instructions at any time in [employer]? You can also review all of your test results from your hospital stay or ER visit. Additional Information If you have questions, please visit the Frequently Asked Questions section of the [employer] website at [email or web address] Remember, [employer] is NOT to be used for urgent needs. For medical emergencies, dial 911. Now available from your iPhone and Android! General Information Please provide this summary of care documentation to your next provider. Patient Signature:  ____________________________________________________________ Date:  ____________________________________________________________  
  
Princess Miranda Provider Signature:  ____________________________________________________________ Date:  ____________________________________________________________ More Information Narcotic-Analgesic/Acetaminophen (Percocet, Norco, Lorcet HD, Lortab 10/325) - (By mouth) Why this medicine is used:  
Relieves pain. Contact a nurse or doctor right away if you have: · Extreme weakness, shallow breathing, slow heartbeat · Severe confusion, lightheadedness, dizziness, fainting · Yellow skin or eyes, dark urine or pale stools · Severe constipation, severe stomach pain, nausea, vomiting, loss of appetite · Sweating or cold, clammy skin Common side effects: · Mild constipation, nausea, vomiting · Sleepiness, tiredness · Itching, rash © 2017 [address] Information is for End User's use only and may not be sold, redistributed or otherwise used for commercial purposes.

## 2017-05-12 NOTE — PERIOP NOTES
Sister brought to bedside for a post - op visit. No room assignment as of yet. Sister returned to waiting area inside unit and left for the day. Pt phone and books brought to bedside. Per pt family request pts son Chalino Herr was phoned and updated as to patients condition. [place] stated he was on his way to hospital now.   Austin Robertson RN

## 2017-05-12 NOTE — ROUTINE PROCESS
Patient: Armond Conti MRN: [id number]  SSN: [id number]   YOB: 1955  Age: 64 y.o. Sex: female     Patient is status post Procedure(s):  PARATHYROID EXCISION, MINIMALLY INVASIVE RADIOGUIDED PARATHYROID EXPLORATION.     Surgeon(s) and Role:     * Sherral Riedel, MD - Primary    Local/Dose/Irrigation:  SEE MAR            PICC Single Lumen 48/27/20 Right;Basilic (Active)          Peripheral IV 05/12/17 Right Hand (Active)   Dressing Status Clean, dry, & intact 5/12/2017 11:00 AM   Dressing Type Transparent 5/12/2017 11:00 AM          Penrose Drain 11/08/12 Left Other (comment) (Active)                     Dressing/Packing:  Wound Neck Anterior-DRESSING TYPE:  (MASTISOL STERI STRIPS) (05/12/17 1300)  Splint/Cast:  ]    Other:

## 2017-05-12 NOTE — ANESTHESIA PREPROCEDURE EVALUATION
Anesthetic History   No history of anesthetic complications            Review of Systems / Medical History  Patient summary reviewed, nursing notes reviewed and pertinent labs reviewed    Pulmonary  Within defined limits                 Neuro/Psych   Within defined limits           Cardiovascular  Within defined limits                     GI/Hepatic/Renal  Within defined limits   GERD      PUD     Endo/Other  Within defined limits      Arthritis     Other Findings              Physical Exam    Airway  Mallampati: II  TM Distance: 4 - 6 cm  Neck ROM: normal range of motion   Mouth opening: Normal     Cardiovascular  Regular rate and rhythm,  S1 and S2 normal,  no murmur, click, rub, or gallop             Dental  No notable dental hx       Pulmonary  Breath sounds clear to auscultation               Abdominal  GI exam deferred       Other Findings            Anesthetic Plan    ASA: 2  Anesthesia type: general          Induction: Intravenous  Anesthetic plan and risks discussed with: Patient

## 2017-05-12 NOTE — BRIEF OP NOTE
BRIEF OPERATIVE NOTE    Date of Operation: 12 May 2017   Preoperative Diagnosis:   Primary hyperparathyroidism  Postoperative Diagnosis:   Primary hyperparathyroidism with right inferior parathyroid adenoma       Procedure(s):   Neck exploration   Radioguided parathyroid surgery / parathyroidectomy of  RIGHT INFERIOR parathyroid adenoma  Surgeon(s) and Role:      * Sonia Boyer MD  Co-surgeon   * Thor Mckeon MD - Co-surgeon   Anesthesia: General +4 ml of Local (50:50 mix of 1% LIDO + EPI and 0.5% MARCAINE)   Urine output: Not documented   Estimated Blood Loss: Minimal   IVF: 500 ml Normal Saline   Drains: None   Pre-operative iPTH = 201.7 pg/ml   Patient in room at 1202 hours   Antibiotic prophylaxis ANCEF 2 grams given at 1218 hours   Beta blocker NOT indicated   Pre-prep time out: 1227 hours   Prayer at 1237 hours   Time out for Surgery at 1236 hours   (Correct patient, operative site and procedure confirmed, along with having the necessary equipment on hand to perform the operation safely)   Start of surgery at 1237 hours   End of surgery at 1315 hours   VTE prophylaxis with bilateral thigh high KRISTIN hose and bilateral lower extremity compression devices   Pressure points padded   Sponge, sharp and instrument count: Correct   Surgical Staff:  Circ-1: Keely Bobby RN  Circ-2: Pee Branch RN  Scrub Tech-1: Benoit Krishnamurthy  Scrub RN - Intern: Gualberto Mac  Event Time In   Incision Start 1237   Incision Close 1321       Specimens:   ID Type Source Tests Collected by Time Destination   1 : clinically enlarged right inferior parathyroid Frozen Section Parathyroid  Nichol Benz MD 5/12/2017 1246 Pathology        Findings:   One abnormal parathyroid gland identified  grossly consistent with adenoma, right inferior anatomical location   Clinically normal right superior parathyroid gland  Clinically normal left superior parathyroid gland  Clinically normal left inferior parathyroid gland    Right inferior parathyroid adenoma (11 x 6 mm; 0.28 grams); completely excised  intact and submitted to pathology. Frozen section: confirmatory  hypercellular parathyroid tissue     Right superior parathyroid gland, clinically normal (6 x 5 mm); No biopsy obtained. Left superior parathyroid gland, clinically normal (6 x 3 mm); No biopsy obtained. Left inferior parathyroid gland, clinically normal (6 x 4 mm); No biopsy obtained. No findings suspicious for malignancy. No palpable central or lateral neck adenopathy. A diligent search of the central and both lateral areas of the neck was unrevealing, specifically there was no palpable adenopathy in either the right or left lateral (Level II, III, IV) neck, or the central compartment (Level VI and VII)     Both right and left recurrent laryngeal nerves were identified and carefully preserved throughout the entire course of the operation, and confirmed to be structurally and functionally intact. The structural and functional integrity of the left and right recurrent laryngeal nerves was confirmed with the nerve stimulator ([employer]), as prominent audible signal was attained when both branches of the left and right recurrent laryngeal nerves were stimulated.      Excellent hemostasis confirmed at end of operation     Estimated Blood Loss: MINIMAL   Specimens:        Para-   thyroid  Right    Left      Type  Score   Type  Score    Upper     Upper      Parathyroid Clinically normal   (6 x 5 mm in size)  Biopsy not obtained  Frozen section: None      Parathyroid Clinically normal   (6 x 3 mm in size)  Biopsy not obtained  Frozen section: None       Lower          Parathyroid Adenoma   (11 x 6 mm in size;   weight 0.28 grams)   Completely excised   Frozen: Hypercellular PT   Counts = 162  Background: 33     Parathyroid Clinically normal   (6 x 4 mm in size)  Biopsy not obtained  Frozen section: None         To summarize: 10 minutes post-resection of the right inferior parathyroid adenoma, serum iPTH level dropped from 201.7 pg/ml to 32.4 pg/ml, biochemically consistent with curative resection according to our pre-specified criteria:   1. > 50 % drop in serum iPTH from pre-operative baseline, and   2. drop of serum iPTH into the normal range. The structural and functional integrity of the left and right recurrent laryngeal nerves was confirmed with the nerve stimulator ([facility]), as a loud audible signal was attained when both branches of the left and right recurrent laryngeal nerves were stimulated. A diligent search of the central and both lateral areas of the neck was unrevealing, specifically there was no palpable adenopathy in either the right or left lateral (Level II, III, IV) neck, or the central compartment (Level VI and VII)   Excellent hemostasis was confirmed   The operative site was irrigated with saline and excellent hemostasis confirmed   Excellent hemostasis in the operative field was re-confirmed under repeated Valslava maneuver. The operative field was irrigated and hemostasis once again confirmed   The strap musculature (sternothyroid and sternohyoid muscles) was re-approximated in the midline with running 3-0 Vicryl suture. The platysma muscle was reconstituted with interrupted absorbable (3-0 Vicryl) suture   The wound was irrigated with saline   The skin closure was completed with running 5-0 monocryl and single transversely oriented Steri-strip   This was an uncomplicated operation with minimal blood loss. The patient was extubated without incident and escorted to the PACU in stable condition with aspiration precautions in effect   Complications: None   Implants: None   Disposition:    To PACU extubated and in stable condition with aspiration precautions in effect   Walt Duarte MD

## 2017-05-12 NOTE — PERIOP NOTES
TRANSFER - OUT REPORT:    Verbal report given to Evans Mary RN(name) on Emile Friend  being transferred to # [phone] - on [address] (unit) for routine progression of care       Report consisted of patients Situation, Background, Assessment and   Recommendations(SBAR). Information from the following report(s) SBAR, Kardex, OR Summary, Procedure Summary, Intake/Output, MAR, Accordion, Recent Results and Med Rec Status was reviewed with the receiving nurse. Lines:   PICC Single Lumen 75/96/37 Right;Basilic (Active)       Peripheral IV 05/12/17 Right Hand (Active)   Site Assessment Clean, dry, & intact 5/12/2017  1:30 PM   Phlebitis Assessment 0 5/12/2017  1:30 PM   Infiltration Assessment 0 5/12/2017  1:30 PM   Dressing Status Clean, dry, & intact 5/12/2017  1:30 PM   Dressing Type Transparent 5/12/2017  1:30 PM   Hub Color/Line Status Pink;Patent; Infusing 5/12/2017  1:30 PM        Opportunity for questions and clarification was provided.       Patient transported with:   Registered Nurse

## 2017-05-12 NOTE — PROGRESS NOTES
New patient review note      TRANSFER - IN REPORT:    Verbal report received from Katerina Christianson (name) on Shasha Schmitt    being received from [facility] (unit)   Going to Room#: to [phone] from   AS/   for routine post - op      Report consisted of patients Situation, Background, Assessment and   Recommendations(SBAR). Information from the following report(s) SBAR, Kardex, OR Summary, Procedure Summary, MAR and Recent Results was reviewed with the receiving nurse. Opportunity for questions and clarification was provided. Assessment completed upon patients arrival to unit and care assumed. SITUATION:------------------------------------------------------------------------------------  Admitted:  5/12/2017         Attending Provider:  Tiffany Cope MD        Admitting Dx:  PARATHYROID NEOPLASM BENIGN, PRIMARY HYPERPARATHYROIDISM  PARATHYROID NEOPLASM BENIGN, PRIMARY HYPERPARATHYROIDISM    Active Problems:    * No active hospital problems. *    1 of 4 lobes removed  Hormone levels improving rapidly  L humerus closed fracture 2012 w/long term abx via PICC  S/p gastric bypass  Still smokes  To PACU approx 3 hrs ago  0.5 mg dilaudid IV & 5 mg oxycodone given  Active bowel sounds  R hand 20 guage    Day of Surgery of Procedure(s):  PARATHYROID EXCISION, MINIMALLY INVASIVE RADIOGUIDED PARATHYROID EXPLORATION   BY: Tiffany Cope MD             ON: 5/12/2017    Consultations:  None      PCP:  Crescencio Nguyen MD   [phone]    Code Status: No Order             Advance Directive? Verified     Isolation:  There are currently no Active Isolations       MDRO: No current active infections    BACKGROUND:-------------------------------------------------------------------------------------------  Allergies: Allergies   Allergen Reactions    Codeine Hives     Tolerates Dilaudid       Past Medical History:   Diagnosis Date    Arthritis     OSTEO    Chronic pain     LT.  ARM    GERD (gastroesophageal reflux disease)     History of blood transfusion 1995    LOLY, NO REACTION; needed transfusion from surgery-ovarian cyst and hit artery per pt.  Infection 2012    left shoulder    Morbid obesity ([address] 3/23/2011    PUD (peptic ulcer disease)        Past Surgical History:   Procedure Laterality Date    BIOPSY/EXCISION, LYMPH NODE(S)      BREAST SURGERY PROCEDURE UNLISTED      LT. TUMOR BENIGN REMOVED AGE 18    HX ABDOMINOPLASTY  2006    HX ADENOIDECTOMY      HX CARPAL TUNNEL RELEASE  2001    RIGHT    HX CHOLECYSTECTOMY  2000    HX COLONOSCOPY      2014, due 17 vs 19    HX GASTRIC BYPASS  12-13-00    dr. Erik Page HX GI  2016    endoscopy, colonoscopy    HX HEENT  1995    TUMOR REMOVED neck (benign)    HX HYSTERECTOMY  1993    HX ORTHOPAEDIC      CYCST REMOVED BASE OF SPINE AGE 23    HX ORTHOPAEDIC  [id number]    RIGHT ANKLE    HX ORTHOPAEDIC  4/2012    LT. SHOULDER AND HUMERUS FX, SCREW AND PLATE ( has been removed)    HX ORTHOPAEDIC Right 2014    shoulder fx, orif    HX OTHER SURGICAL      TUMOR REMOVED RIGHT UPPER FLANK AREA    HX TONSIL AND ADENOIDECTOMY      AGE5    HX TONSILLECTOMY      HX TUBAL LIGATION  1981    LEG/ANKLE SURGERY PROC UNLISTED      right ankle cartilage    LEG/ANKLE SURGERY PROC UNLISTED  2009     ankle       Prescriptions Prior to Admission   Medication Sig    MELATONIN PO Take  by mouth nightly as needed.  cyanocobalamin (VITAMIN B-12) 1,000 mcg tablet Take 1,000 mcg by mouth daily.  esomeprazole (NEXIUM) 40 mg capsule Take 1 Cap by mouth two (2) times a day. Per dr Vero Higgins       Vaccinations:    Immunization History   Administered Date(s) Administered    Influenza Vaccine 10/01/2015    Influenza Vaccine Split 11/09/2012    Tdap 04/07/2015    Zoster Vaccine, Live 04/23/2016       Readmission Risk:        ASSESSMENT:------------------------------------------------------------------------------------------  Age: 64 y.o. Gender: female          Height: Height: 5' 4\" (162.6 cm)      Wt Readings from Last 3 Encounters:   05/12/17 76.2 kg (168 lb)   05/08/17 76.2 kg (168 lb)   02/21/17 79 kg (174 lb 3.2 oz)          Patient Vitals for the past 8 hrs:   Temp Pulse Resp BP SpO2   05/12/17 1600 - 70 21 168/71 96 %   05/12/17 1530 - 64 21 153/82 96 %   05/12/17 1515 - 71 19 157/80 96 %   05/12/17 1500 - 71 20 175/80 97 %   05/12/17 1445 - 69 22 170/76 96 %   05/12/17 1430 - 71 23 168/70 96 %   05/12/17 1415 - 72 23 171/72 93 %   05/12/17 1400 - - - 167/75 -   05/12/17 1345 - - - 159/84 -   05/12/17 1340 - - - 166/83 -   05/12/17 1335 - - - 159/80 -   05/12/17 1333 97.7 °F (36.5 °C) 97 16 (!) 147/110 95 %   05/12/17 1330 97.5 °F (36.4 °C) 95 18 155/88 95 %   05/12/17 1143 98.3 °F (36.8 °C) 78 18 136/80 99 %   05/12/17 1124 98.3 °F (36.8 °C) 62 18 131/60 98 %          Recent Results (from the past 24 hour(s))   PTH, PRE-PREP, INTRA-OP    Collection Time: 05/12/17 11:30 AM   Result Value Ref Range    Time drawn 1130      Intra-op .7 (H) 14.0 - 72.0 pg/mL   PTH, 10 MIN. POST-EXCISION, INTRA-OP    Collection Time: 05/12/17  1:05 PM   Result Value Ref Range    Time drawn 1305      Intra-op PTH 32.4 14.0 - 72.0 pg/mL    Absolute % decrease (NOTE) %   PTH, POST-ADDL, INTRA-OP    Collection Time: 05/12/17  1:40 PM   Result Value Ref Range    Time drawn 1340      PTH, post addl, Intra-Op 13.8 (L) 14.0 - 72.0 pg/mL       Active Orders   There are no active orders of the following type(s): Diet. Orientation:      Active Lines/Drains:   Lines:   PICC Single Lumen 36/89/63 Right;Basilic (Active)       Peripheral IV 05/12/17 Right Hand (Active)   Site Assessment Clean, dry, & intact 5/12/2017  1:30 PM   Phlebitis Assessment 0 5/12/2017  1:30 PM   Infiltration Assessment 0 5/12/2017  1:30 PM   Dressing Status Clean, dry, & intact 5/12/2017  1:30 PM   Dressing Type Transparent 5/12/2017  1:30 PM   Hub Color/Line Status Pink;Patent; Infusing 5/12/2017  1:30 PM          Date 05/11/17 0700 - 05/12/17 0659(Not Admitted) 05/12/17 0700 - 05/13/17 0659   Shift 0700-1859 1900-0659 24 Hour Total 0700-1859 1900-0659 24 Hour Total   I  N  T  A  K  E   P.O.    25  25      P.O.    25  25    I.V.    500  500      Volume (lactated ringers infusion)    500  500    Shift Total  (mL/kg)    525  (6.9)  525  (6.9)   O  U  T  P  U  T   Shift Total  (mL/kg)         NET    525  525   Weight (kg)    76.2 76.2 76.2       Urine Asess:         Last BM:           Wound Neck Anterior-DRESSING STATUS: Clean, dry, and intact    Wound Neck Anterior-DRESSING TYPE: Adhesive wound closure strips (Steri-Strips)    Mobility: No limitations                RECOMMENDATION:--------------------------------------------------------------

## 2017-05-12 NOTE — OP NOTES
Date of Operation: 12 May 2017   Preoperative Diagnosis:   Primary hyperparathyroidism    Postoperative Diagnosis:   Primary hyperparathyroidism with right inferior parathyroid adenoma       Procedure(s):   Neck exploration   Radioguided parathyroid surgery / parathyroidectomy of  RIGHT INFERIOR parathyroid adenoma    Surgeon(s) and Role:       * Frank Luciano MD  Co-surgeon   * Luz Marina Solorio MD - Co-surgeon   Anesthesia: General +4 ml of Local (50:50 mix of 1% LIDO + EPI and 0.5% MARCAINE)   Urine output: Not documented   Estimated Blood Loss: Minimal     INDICATIONS:    Mikael Salmon is a 64year old female with primary hyperparathyroidism  She has a PMHx of obesity and osteoporosis, and is s/p gastric bypass in 2000 - with Vitamin D deficiency diagnosed after her bypass  She has had abnormally elevated serum calcium levels for the past 3 years  Osteoporosis was diagnosed by DEXA Scan in 2014; she was treated with Reclast (first dose in May 2016). She has a history of falls and has undergone (B) humeral ORIF  Serum calcium is 10.7 mg/dL, iPTH 63 pg/ml, 25-OH Vit D 33.7 ng/dl, 24h UCa 182.6 mg/24h    U/S of neck, parathyroid uptake scan and CT scan of neck were non localizing for parathyroid adenoma however independent review of films -> question of right inferior parathyroid adenoma    Examination: DS DEXA AXIAL SKELETON - [id number] - May 2 2014 2:34PM     IMPRESSION: This patient is osteoporotic using the World Health Organization criteria A direct comparison to last exam cannot be made due to differences in machinery. There has likely been a significant decrease in bone mineral density. 10 year probability of major osteoporotic fracture: 17%  10 year probability of hip fracture: 4.2%     Findings:  Right inferior parathyroid adenoma (11 x 6 mm; 0.28 grams); completely excised  intact and submitted to pathology.    Frozen section: confirmatory  hypercellular parathyroid tissue  - Counts 162, background 33    Right superior parathyroid gland, clinically normal (6 x 5 mm); No biopsy obtained. Left superior parathyroid gland, clinically normal (6 x 3 mm); No biopsy obtained. Left inferior parathyroid gland, clinically normal (6 x 4 mm); No biopsy obtained. PRE EXCISION   Post excision of right inferior parathyroid PTH 10 min 32  Both right and left recurrent laryngeal nerves were identified and carefully preserved throughout the entire course of the operation, and confirmed to be structurally and functionally intact. The structural and functional integrity of the left and right recurrent laryngeal nerves was confirmed with the nerve stimulator (NIM System), as prominent audible signal was attained when both branches of the left and right recurrent laryngeal nerves were stimulated. Specimens:  Right inferior parathyroid gland - hyperplasia on frozen section 0.28 gm    Operation:   The patient was consented. She was brought back tot he operating room and placed under general anesthesia. The patient was placed in the supine position and general anesthesia induced without incident using a NIM endotracheal tube for the purpose of laryngeal nerve monitoring. Prophylactic antibiotic (ANCEF 2 grams IVPB) was administered prior to the procedure. Beta blocker NOT indicated     Sterile anterior neck prep (Chlorhexidine - alcohol) and drape    Pre-operative serum iPTH level was 201.7 pg/ml. 4 cm, anterior cervical incision made. Limited sub-platysmal flaps were created. Neoprobe Navigator GPS radioimmunoguided surgery device was set on Tc99m and 100X   Strap musculature was  in the midline. A dissection place was developed posterior to the left strap musculature using cautery and facilitated by a large atraumatic Cotto retractors.    Yvonne Samuel was used to displace the right thyroid lobe as the right lateral thyroid recess was developed using electrocautery, and then gentle blunt dissection using the Kitner dissector   The fibroareolar floor in the right posterior neck was opened with electrocautery that provided access to the right retroesophageal space   We sought to identify the key structures for exposure of the right superior parathyroid gland the arteriovenous band situated immediately cephalad to where the left middle thyroid vein is situated. The right superior parathyroid gland was identified and found to be clinically normal in size and appearance (6 x 5 mm in size)  We took care not to disrupt the right superior parathyroid gland during the course of dissection, mobilization and complete evaluation of this gland. The right superior parathyroid gland adenoma was not biopsied  ---   We began the search for the left inferior parathyroid gland by dissecting gently low into the thyro-thymic ligament and apical thymus, and then proceeding cephalad with the dissection. Dissection was performed using a combination of gentle blunt Kitner dissection as well as the tip of a right angle clamp to identify and expose an abnormal appearing right inferior parathyroid gland (11 x 6 mm in size)  grossly consistent with right inferior parathyroid gland adenoma. We took care not to disrupt the parathyroid gland during the course of dissection, mobilization and complete evaluation of the right inferior parathyroid gland. The left inferior parathyroid gland adenoma was excised (11 x 6 mm in size and ____ grams in weight with ex vivo counts of  162 pg/mg/min and background count of 33 pg/mg/min; i.e. hyperactive parathyroid) and submitted to frozen section, which was confirmatory  hypercellular parathyroid tissue   ---   Attention was then directed to the left neck.    A dissection place was developed posterior to the left neck strap musculature using cautery and facilitated by atraumatic Cotto retractors   [place] was used to displace the left thyroid lobe anteromedially as the left lateral thyroid recess was developed using electrocautery, and then gentle blunt dissection using a Kitner dissector. The fibroareolar floor was opened with electrocautery that provided access to the left posterior neck - retroesophageal space. We sought to identify the key structures for exposure of the left superior parathyroid gland the arteriovenous band situated immediately cephalad to where the left middle thyroid vein is situated. The left superior parathyroid gland was situated just posterior and lateral to the encountered left recurrent laryngeal nerve   The left superior parathyroid gland was identified and found to be clinically normal in size and appearance (6 x 3 mm in size)  We took care not to disrupt the parathyroid gland during the course of dissection, mobilization and complete evaluation of the left superior parathyroid gland. The left superior parathyroid gland adenoma was not biopsied  ---   We began the search for the left inferior parathyroid gland by dissecting gently low into the thyro-thymic ligament and thymus and then proceeding cephalad with the dissection. Dissection was performed using a combination of gentle blunt Kitner dissection as well as the tip of a right angle clamp to identify and expose a normal appearing left inferior parathyroid gland. The left inferior parathyroid gland was situated just anterior and medial to the encountered right recurrent laryngeal nerve   The left inferior parathyroid gland was examined in its entirety and found to be clinically normal in size and appearance (6 x 4 mm in size). We took care not to disrupt the parathyroid gland capsule during the course of dissection, mobilization and complete evaluation of the left inferior parathyroid gland.    The left inferior parathyroid gland was not biopsied   ---   Throughout our dissection on both sides of the neck we oriented on the recurrent laryngeal nerves, and kept the dissection on the capsule of all four parathyroid glands, taking great care not to rupture the parathyroid gland capsule. We made certain to expose the entire parathyroid gland for each one identified in order to assess it fully. As we did on the left, during the dissection on the right we took care to maintain the encountered recurrent laryngeal nerve out of harms way. Blood was drawn after the excision of the right inferior parathyroid adenoma - serum iPTH 15 minutes following right inferior parathyroid adenoma resection was 32.4 pg/ml. With three normal appearing parathyroid glands identified (right and left superior and left inferior parathyroid glands), and one abnormal parathyroid gland excised (right inferior parathyroid adenoma), and with normalization of serum iPTH after removal of the right inferior parathyroid adenoma, we elected to conclude the operation. To summarize: 10 minutes post-resection of the right inferior parathyroid adenoma, serum iPTH level dropped from 201.7 pg/ml to 32.4 pg/ml, biochemically consistent with curative resection according to our pre-specified criteria:   1. > 50 % drop in serum iPTH from pre-operative baseline, and   2. drop of serum iPTH into the normal range. The structural and functional integrity of the left and right recurrent laryngeal nerves was confirmed with the nerve stimulator ([employer] System), as a loud audible signal was attained when both branches of the left and right recurrent laryngeal nerves were stimulated. A diligent search of the central and both lateral areas of the neck was unrevealing, specifically there was no palpable adenopathy in either the right or left lateral (Level II, III, IV) neck, or the central compartment (Level VI and VII)   Excellent hemostasis was confirmed   The operative site was irrigated with saline and excellent hemostasis confirmed   Excellent hemostasis in the operative field was re-confirmed under repeated Valslava maneuver.    The operative field was irrigated and hemostasis once again confirmed   The strap musculature (sternothyroid and sternohyoid muscles) was re-approximated in the midline with running 3-0 Vicryl suture. The platysma muscle was reconstituted with interrupted absorbable (3-0 Vicryl) suture   The wound was irrigated with saline   The skin closure was completed with running 5-0 monocryl and single transversely oriented Steri-strip   This was an uncomplicated operation with minimal blood loss. The patient was extubated without incident and escorted to the PACU in stable condition with aspiration precautions in effect   Complications: None   Implants: None   Disposition:    To PACU extubated and in stable condition with aspiration precautions in effect   Keiry MARA CARMEN Olmedo MD

## 2017-05-12 NOTE — ANESTHESIA POSTPROCEDURE EVALUATION
Post-Anesthesia Evaluation and Assessment    Patient: Slim De Paz MRN: [id number]  SSN: [id number]    YOB: 1955  Age: 64 y.o. Sex: female       Cardiovascular Function/Vital Signs  Visit Vitals    BP (!) 147/110    Pulse 97    Temp 36.5 °C (97.7 °F)    Resp 16    Ht 5' 4\" (1.626 m)    Wt 76.2 kg (168 lb)    SpO2 95%    BMI 28.84 kg/m2       Patient is status post general anesthesia for Procedure(s):  PARATHYROID EXCISION, MINIMALLY INVASIVE RADIOGUIDED PARATHYROID EXPLORATION. Nausea/Vomiting: None    Postoperative hydration reviewed and adequate. Pain:  Pain Scale 1: Numeric (0 - 10) (05/12/17 1143)  Pain Intensity 1: 0 (05/12/17 1143)   Managed    Neurological Status:   Neuro (WDL): Within Defined Limits (05/12/17 1115)   At baseline    Mental Status and Level of Consciousness: Arousable    Pulmonary Status:   O2 Device: Room air (05/12/17 1333)   Adequate oxygenation and airway patent    Complications related to anesthesia: None    Post-anesthesia assessment completed.  No concerns    Signed By: Jamal Nicholson., MD     May 12, 2017

## 2017-05-13 VITALS
DIASTOLIC BLOOD PRESSURE: 80 MMHG | HEART RATE: 57 BPM | WEIGHT: 168 LBS | SYSTOLIC BLOOD PRESSURE: 135 MMHG | HEIGHT: 64 IN | RESPIRATION RATE: 18 BRPM | TEMPERATURE: 98.2 F | BODY MASS INDEX: 28.68 KG/M2 | OXYGEN SATURATION: 98 %

## 2017-05-13 LAB
CALCIUM SERPL-MCNC: 8.9 MG/DL (ref 8.5–10.1)
PTH-INTACT SERPL-MCNC: 12 PG/ML (ref 14–72)

## 2017-05-13 PROCEDURE — 36415 COLL VENOUS BLD VENIPUNCTURE: CPT | Performed by: OTOLARYNGOLOGY

## 2017-05-13 PROCEDURE — 99218 HC RM OBSERVATION: CPT

## 2017-05-13 PROCEDURE — 74011250637 HC RX REV CODE- 250/637: Performed by: OTOLARYNGOLOGY

## 2017-05-13 PROCEDURE — 83970 ASSAY OF PARATHORMONE: CPT | Performed by: OTOLARYNGOLOGY

## 2017-05-13 RX ORDER — FERROUS SULFATE, DRIED 160(50) MG
2 TABLET, EXTENDED RELEASE ORAL EVERY 6 HOURS
Qty: 136 TAB | Refills: 1 | Status: SHIPPED | OUTPATIENT
Start: 2017-05-13 | End: 2017-05-27

## 2017-05-13 RX ORDER — ONDANSETRON 8 MG/1
4-8 TABLET, ORALLY DISINTEGRATING ORAL
Qty: 15 TAB | Refills: 1 | Status: SHIPPED | OUTPATIENT
Start: 2017-05-13 | End: 2017-11-21 | Stop reason: ALTCHOICE

## 2017-05-13 RX ORDER — OXYCODONE HYDROCHLORIDE AND ACETAMINOPHEN 7.5; 325 MG/1; MG/1
1-2 TABLET ORAL
Qty: 60 TAB | Refills: 0 | Status: SHIPPED | OUTPATIENT
Start: 2017-05-13 | End: 2017-11-21 | Stop reason: ALTCHOICE

## 2017-05-13 RX ADMIN — OXYCODONE HYDROCHLORIDE AND ACETAMINOPHEN 2 TABLET: 5; 325 TABLET ORAL at 02:48

## 2017-05-13 RX ADMIN — PANTOPRAZOLE SODIUM 40 MG: 40 TABLET, DELAYED RELEASE ORAL at 07:03

## 2017-05-13 RX ADMIN — OXYCODONE HYDROCHLORIDE AND ACETAMINOPHEN 1 TABLET: 5; 325 TABLET ORAL at 07:02

## 2017-05-13 RX ADMIN — Medication 1000 MCG: at 08:53

## 2017-05-13 RX ADMIN — OXYCODONE HYDROCHLORIDE AND ACETAMINOPHEN 1 TABLET: 5; 325 TABLET ORAL at 10:58

## 2017-05-13 NOTE — ROUTINE PROCESS
Bedside and Verbal shift change report [facility] to [id number] Villadenita Hensley (oncoming nurse) by Melia Rey RN (offgoing nurse). Report included the following information SBAR, Kardex, Intake/Output, MAR and Recent Results.

## 2017-05-13 NOTE — PROGRESS NOTES
Primary Nurse Chung Dominguez RN and Nicole Levin RN performed a dual skin assessment on this patient Impairment noted- see wound doc flow sheet  Angel score is 22  (surgical incision to neck)

## 2017-05-13 NOTE — DISCHARGE INSTRUCTIONS
Post  Parathyroidectomy Instructions    Follow up: with Dr. Marybel Casillas 2 weeks after surgery to have your steristrips removed. Shortly after surgery call [phone] to schedule this appointment.  Eat regular foods.  You may shower in 24 hours. Do not allow direct water pressure on your wound. If water trickles down while washing your hair, allow the wound to dry on its own.  Do not scrub or soak your wound for 2 weeks or 14 days.  No strenuous activity: for 14 days.  No moving more than 15 pounds: for 14 days. Then includes pulling, pushing, tugging, throwing.  There is generally not a lot of pain: with this surgery. Take your pain medication as needed. Most patients, if they do have pain, will have neck stiffness/discomfort. You may have numbness or tingling surrounding the area of your wound. Narcotics can cause constipation; use your Colace if this is the case.  Nausea and vomiting: from lingering effects of general anesthesia usually resolves by the following day. The narcotic pain medication can cause nausea and vomiting. They should be taken with food or fluids to minimize this. Medications that reduce nausea and vomiting can be prescribed by your physician.  Fever above 100.4, redness around wound, pus drainage from wound: call your doctor.  Bleeding: is uncommon (less than 1%). If it does occur your neck will develop a fullness. It is good to take a look at your neck shortly after surgery to see what a baseline appearance is. If there are changes to this, then call your provider. \    CALL or TEXT Dr. Marybel Casillas for questions or concerns - text works best - [phone] [facility] may be on Calcium ([place]) - it is very important that you take this.   Dr. Marybel Casillas will start a taper at your follow up visit  o If you experience tingling in the hands or around your mouth, your calcium may be dropping, go to the emergency room immediately and tell them you had your parathyroid removed.

## 2017-05-13 NOTE — PROGRESS NOTES
Bedside and Verbal shift change report given to Kimberly Frye RN (oncoming nurse) by Elizabeth Andre (offgoing nurse). Report included the following information SBAR, Kardex, OR Summary, Intake/Output and MAR.

## 2017-05-13 NOTE — PROGRESS NOTES
The observation notice was attempted to be signed. Ms. Terrie Rodarte had been discharged prior to the completion of the form.   Beth Fox LCSW, VERONICAC, LINDA

## 2017-05-13 NOTE — PROGRESS NOTES
Bedside and Verbal shift change report given to Angelina Hensley (oncoming nurse) by Yousif Robbins RN (offgoing nurse). Report included the following information SBAR, Kardex, OR Summary, Intake/Output and MAR.

## 2017-06-20 ENCOUNTER — OFFICE VISIT (OUTPATIENT)
Dept: ENDOCRINOLOGY | Age: 62
End: 2017-06-20

## 2017-06-20 VITALS
OXYGEN SATURATION: 98 % | DIASTOLIC BLOOD PRESSURE: 94 MMHG | HEART RATE: 68 BPM | WEIGHT: 174 LBS | HEIGHT: 64 IN | SYSTOLIC BLOOD PRESSURE: 129 MMHG | BODY MASS INDEX: 29.71 KG/M2 | RESPIRATION RATE: 16 BRPM

## 2017-06-20 DIAGNOSIS — E83.52 HYPERCALCEMIA: Primary | ICD-10-CM

## 2017-06-20 DIAGNOSIS — Z86.39 H/O HYPERPARATHYROIDISM: ICD-10-CM

## 2017-06-20 RX ORDER — DOCUSATE SODIUM 100 MG/1
CAPSULE, LIQUID FILLED ORAL
Refills: 2 | COMMUNITY
Start: 2017-05-13 | End: 2017-11-21

## 2017-06-20 NOTE — PROGRESS NOTES
Endocrinology Visit    Chief Complaint: hypercalcemia    History of Present Illness:  Yeny Cardozo is a 58 y.o. female with h/o obesity s/p gastric bypass and osteoporosis who returns for hypercalcemia. I saw her in initial consultation in February at which time I started a work-up and confirmed primary hyperparathyroidism. Sestamibi failed to identify a candidate but given her osteoporosis, I referred her to Dr Dennie Dada for 4 gland exploration. In the interim, she underwent surgery on 5/12/17 and the right inferior parathyroid gland was removed. Intra-op PTH decreased from 201 to 12. She reports feeling much better since the surgery, feels her energy level and overall wellbeing is improved. She has some mild tenderness at her incision site and noticed there is a suture protruding. She is still taking Oscal with Ca+D, just one per day. Denies face twitching, increase in muscle cramps, or wm-oral tingling. She has a history of lactose intolerance so rarely eats ice cream or milk. Has cheese on occasion. She has a history of osteoporosis based on a DXA in 2014 and received her first infusion of Reclast in May 2016. She denies recent fractures but has fractured both shoulders (possibly humeral) s/p ORIF after mechanical falls. Risk factors for osteoporosis include premature menopause - underwent a DEYSI in her early 35s for early stage cervical cancer. Weight is stable, about 100 lbs less than it was prior to her gastric bypass. Review of Systems: as above, otherwise 7 pt review is negative    Problem List:  Patient Active Problem List   Diagnosis Code    Closed fracture of left proximal humerus S42.202A    Post op infection T81. 4XXA    Cellulitis of shoulder L03.119    Insomnia, unspecified G47.00    Grief reaction F43.20    Pernicious anemia D51.0    Unspecified vitamin D deficiency E55.9    Reflux esophagitis K21.0    Gastric ulcer K25.9    Symptomatic menopausal or female climacteric states N95.1    Osteoporosis M81.0    Iron deficiency anemia, unspecified D50.9    Onychomycosis B35.1    Active advance directive on file Z78.9    Hypercalcemia E83.52    H/O hyperparathyroidism Z86.39       Past Medical History:    Past Medical History:   Diagnosis Date    Arthritis     OSTEO    Chronic pain     LT. ARM    GERD (gastroesophageal reflux disease)     History of blood transfusion 1995    [place], NO REACTION; needed transfusion from surgery-ovarian cyst and hit artery per pt.  Infection 2012    left shoulder    Morbid obesity ([address] 3/23/2011    PUD (peptic ulcer disease)        Past Surgical History:  Past Surgical History:   Procedure Laterality Date    BIOPSY/EXCISION, LYMPH NODE(S)      BREAST SURGERY PROCEDURE UNLISTED      LT. TUMOR BENIGN REMOVED AGE 18    ENDOCRINE SURGERY PROC UNLISTED      HX ABDOMINOPLASTY  2006    HX ADENOIDECTOMY      HX CARPAL TUNNEL RELEASE  2001    RIGHT    HX CHOLECYSTECTOMY  2000    HX COLONOSCOPY      2014, due 17 vs 19    HX GASTRIC BYPASS  12-13-00    dr. Chuy Mccollum HX GI  2016    endoscopy, colonoscopy    HX HEENT  1995    TUMOR REMOVED neck (benign)    HX HYSTERECTOMY  1993    HX ORTHOPAEDIC      CYCST REMOVED BASE OF SPINE AGE 23    HX ORTHOPAEDIC  [id number]    RIGHT ANKLE    HX ORTHOPAEDIC  4/2012    LT. SHOULDER AND HUMERUS FX, SCREW AND PLATE ( has been removed)    HX ORTHOPAEDIC Right 2014    shoulder fx, orif    HX OTHER SURGICAL      TUMOR REMOVED RIGHT UPPER FLANK AREA    HX TONSIL AND ADENOIDECTOMY      AGE7    HX TONSILLECTOMY      HX TUBAL LIGATION  1981    LEG/ANKLE SURGERY PROC UNLISTED      right ankle cartilage    LEG/ANKLE SURGERY PROC UNLISTED  2009     ankle       Social History:  Social History     Social History    Marital status:      Spouse name: N/A    Number of children: N/A    Years of education: N/A     Occupational History    Not on file.      Social History Main Topics    Smoking status: Current Every Day Smoker     Packs/day: 0.50     Years: 7.00     Types: Cigarettes    Smokeless tobacco: Never Used    Alcohol use 2.4 oz/week     1 Glasses of wine, 3 Cans of beer per week      Comment: 6 PACK/every 2 weeks    Drug use: No    Sexual activity: Not on file     Other Topics Concern    Not on file     Social History Narrative       Family History:  Family History   Problem Relation Age of Onset    Other Mother      hypoglycemia, obese    Cancer Mother      LUNG    Lung Disease Mother     Cancer Father      GENERALIZED    Asthma Sister     Lung Disease Sister     Hypertension Brother     Lung Disease Brother     Diabetes Brother     Thyroid Disease Brother     Heart Disease Maternal Aunt     Cancer Maternal Uncle      UNKNOWN    Cancer Paternal Aunt      BREAST    Cancer Paternal Uncle     Heart Disease Maternal Grandmother     Heart Disease Maternal Grandfather     Heart Disease Paternal Grandmother     Heart Disease Paternal Grandfather     Cancer Paternal Uncle     Lung Disease Brother     Anesth Problems Neg Hx        Medications:     Current Outpatient Prescriptions:      mg capsule, TK 1 C PO BID PRF CONSTIPATION, Disp: , Rfl: 2    CALCIUM CARBONATE/VITAMIN D3 (OS-JESSICA 500 + D3 PO), Take  by mouth., Disp: , Rfl:     MELATONIN PO, Take  by mouth nightly as needed. , Disp: , Rfl:     cyanocobalamin (VITAMIN B-12) 1,000 mcg tablet, Take 1,000 mcg by mouth daily. , Disp: , Rfl:     esomeprazole (NEXIUM) 40 mg capsule, Take 1 Cap by mouth two (2) times a day. Per dr Liya Pruitt: 1 Cap, Rfl: 0    docusate sodium (COLACE) 50 mg capsule, Take 2 Caps by mouth two (2) times daily as needed for Constipation. , Disp: 30 Cap, Rfl: 2    PERCOCET 7.5-325 mg per tablet, Take 1-2 Tabs by mouth every four (4) hours as needed for Pain.  Max Daily Amount: 12 Tabs., Disp: 60 Tab, Rfl: 0    ondansetron (ZOFRAN ODT) 8 mg disintegrating tablet, Take 0.5-1 Tabs by mouth every eight (8) hours as needed for Nausea., Disp: 15 Tab, Rfl: 1    Allergies: Allergies   Allergen Reactions    Codeine Hives     Tolerates Dilaudid       Physical Examination:  Visit Vitals    BP (!) 129/94    Pulse 68    Resp 16    Ht 5' 4\" (1.626 m)    Wt 174 lb (78.9 kg)    SpO2 98%    BMI 29.87 kg/m2     Gen: no acute distress  HEENT: mucous membranes moist, fair dentition, negative Chovsteks  Thyroid: well healed incision at base of neck, small clear suture 1cm long protruding above incision, no purulence or erythema  CAD: normal rate, regular rhythm. No murmur rubs or gallops  PULM: clear to ausculation, no wheezes, rhonchis or rales.   EXT: no clubbing, cyanosis or edema  Neuro: grossly non focal, normal DTRs  Psych: pleasant, good insight into medical hx      Clinical Data Review:    Component      Latest Ref Rng & Units 5/13/2017 5/12/2017 5/12/2017 5/12/2017           3:00 AM  1:40 PM  1:05 PM 11:30 AM   Time drawn        1340 1305 1130   Intra-op PTH      14.0 - 72.0 pg/mL   32.4 201.7 (H)   Absolute % decrease      %   (NOTE)    PTH, post addl, Intra-Op      14.0 - 72.0 pg/mL  13.8 (L)     Calcium      8.5 - 10.1 MG/DL 8.9      PTH, Intact      14.0 - 72.0 pg/mL 12.0 (L)        Component      Latest Ref Rng & Units 2/21/2017           2:39 PM   Time drawn          Intra-op PTH      14.0 - 72.0 pg/mL    Absolute % decrease      %    PTH, post addl, Intra-Op      14.0 - 72.0 pg/mL    Calcium      8.5 - 10.1 MG/DL    PTH, Intact      14.0 - 72.0 pg/mL 63     Lab Results   Component Value Date/Time    Calcium 8.9 05/13/2017 03:00 AM     Lab Results   Component Value Date/Time    Vitamin D 25-Hydroxy 14.2 04/15/2011 10:35 AM    VITAMIN D, 25-HYDROXY 33.7 02/21/2017 02:39 PM         Component      Latest Ref Rng & Units 2/27/2017 2/27/2017          12:00 AM 12:00 AM   Creatinine, urine      Not Estab. mg/dL  30.7   Creatinine,urine 24 hr      800 - 1800 mg/24 hr  675 (L) Calcium,urine mg/dL      Not Estab. mg/dL 8.3    Calcium mg/24 hr      100.0 - 300.0 mg/24 hr 182.6        NM Parathyroid Scan  FINDINGS:  The initial images demonstrate physiologic tracer uptake in the salivary glands,  thyroid, and myocardium.     The delayed images demonstrate no abnormal tracer activity in the neck or chest  to suggest parathyroid adenoma.     IMPRESSION:  No evidence of parathyroid adenoma. Thyroid/Parathyroid US  FINDINGS: The right thyroid lobe measures 4.4 x 2.0 x 1.5 cm. The left thyroid  lobe measures 4.1 x 1.6 x 1.1 cm. The isthmus measures 0.2 cm in AP thickness.     The thyroid gland is normal in size, echogenicity, and vascularity. No thyroid  nodule or calcification is demonstrated. No normal or abnormal parathyroid gland  is identified.     IMPRESSION:   Normal thyroid ultrasound. Pathology May 2017  FINAL PATHOLOGIC DIAGNOSIS   Parathyroid, right inferior, parathyroidectomy:   Hypercellular parathyroid tissue consistent with adenoma. DXA May 2014  Findings:  Fractures identified on Lateral scanogram: None     Lumbar spine: L1-L4  Bone mineral density (gm/cm2): 0.786  % of peak bone mass: 66  % for age matched controls: 79  T score: -3.3  Z score: -2.8     Hip: Left femoral neck  Bone mineral density (gm/cm2): 0.771  % of peak bone mass: 75  % for age matched controls: 80  T score: -1.9  Z score: -1.1     Hip: Right femoral neck  Bone mineral density (gm/cm2): 0.726  % of peak bone mass: 70  % for age matched controls: 66  T score: -2.2  Z score: -1.5     Left forearm  Bone mineral density (gm/cm2): 0.578  % of peak bone mass: 65  % for age matched controls: 70  T score: -3.5  Z score: -2.7      IMPRESSION:  This patient is osteoporotic using the World Health Organization criteria  A direct comparison to last exam cannot be made due to differences in   machinery. There has likely been a significant decrease in bone mineral   density.   10 year probability of major osteoporotic fracture: 17%  10 year probability of hip fracture: 4.2%    Assessment and Plan:  Patient is a 58 y.o. female here for hypercalcemia due to primary hyperparathyroidism, now s/p right inferior parathyroidectomy. The surgical indication was metabolic bone disease (DXA c/w primary hyperparathyroidism given the most negative T-score at the distal 1/3 radius). I would not recommend additional zolindronic acid treatments due to potential for hungry bone syndrome. Will check post-op calcium, phosphorus, PTH and magnesium levels to ensure her electrolytes are stable. I advised her to continue her current Ca-VitD supplementation for now and to f/u with Dr Malini Danielle regarding suture removal.     I spent 25 minutes with the patient today and > 50% of the time was spent counseling the patient about causes of hypercalcemia, work-up and potential treatment. She will return in 6 months time. Thank you for the opportunity to participate in this patient's care. Leroy Capps MD  [facility]  [address] [facility]    ------------------------------------------------------------------------  [facility] 6/21/17:    Labs indicate normocalcemia and resolution of PTH elevation post-op.     Lab Results   Component Value Date/Time    Calcium 8.9 06/20/2017 03:34 PM    Phosphorus 3.8 06/20/2017 03:34 PM    PTH, Intact 50 06/20/2017 03:34 PM     Lab Results   Component Value Date/Time    Sodium 145 06/20/2017 03:34 PM    Potassium 4.8 06/20/2017 03:34 PM    Chloride 103 06/20/2017 03:34 PM    CO2 25 06/20/2017 03:34 PM    Anion gap 5 05/08/2017 02:36 PM    Glucose 89 06/20/2017 03:34 PM    BUN 8 06/20/2017 03:34 PM    Creatinine 0.60 06/20/2017 03:34 PM    BUN/Creatinine ratio 13 06/20/2017 03:34 PM    GFR est  06/20/2017 03:34 PM    GFR est non-AA 98 06/20/2017 03:34 PM    Calcium 8.9 06/20/2017 03:34 PM

## 2017-06-20 NOTE — MR AVS SNAPSHOT
Visit Information Date & Time Provider Department Dept. Phone Encounter #  
 6/20/2017  2:50 PM Whitney Kussmaul, [facility] [phone] [id number] Follow-up Instructions Return in about 6 months (around 12/20/2017). Upcoming Health Maintenance Date Due Pneumococcal 19-64 Medium Risk (1 of 1 - PPSV23) 6/15/1974 BREAST CANCER SCRN MAMMOGRAM 9/16/2016 INFLUENZA AGE 9 TO ADULT 8/1/2017 PAP AKA CERVICAL CYTOLOGY 4/1/2018 COLONOSCOPY 6/1/2020 DTaP/Tdap/Td series (2 - Td) 4/7/2025 Allergies as of 6/20/2017  Review Complete On: 6/20/2017 By: Fidel Mcclelland Severity Noted Reaction Type Reactions Codeine  03/23/2011    Hives Tolerates Dilaudid Current Immunizations  Reviewed on 5/2/2016 Name Date Influenza Vaccine 10/1/2015 Influenza Vaccine Split 11/9/2012 10:52 AM  
 Tdap 4/7/2015 Zoster Vaccine, Live 4/23/2016 Not reviewed this visit You Were Diagnosed With   
  
 Codes Comments Hypercalcemia    -  Primary ICD-10-CM: Y45.55 
ICD-9-CM: 275.42   
 H/O hyperparathyroidism     ICD-10-CM: Z86.39 
ICD-9-CM: V12.29 Vitals BP Pulse Resp Height(growth percentile) Weight(growth percentile) SpO2  
 (!) 129/94 68 16 5' 4\" (1.626 m) 174 lb (78.9 kg) 98% BMI OB Status Smoking Status 29.87 kg/m2 Hysterectomy Current Every Day Smoker Vitals History BMI and BSA Data Body Mass Index Body Surface Area  
 29.87 kg/m 2 1.89 m 2 Preferred Pharmacy Pharmacy Name Phone [facility] [address], [address] [phone] Your Updated Medication List  
  
   
This list is accurate as of: 6/20/17  3:20 PM.  Always use your most recent med list.  
  
  
  
  
 * docusate sodium 50 mg capsule Commonly known as:  Robin Cost Take 2 Caps by mouth two (2) times daily as needed for Constipation. *  mg capsule Generic drug:  docusate sodium TK 1 C PO BID PRF CONSTIPATION  
  
 esomeprazole 40 mg capsule Commonly known as:  NexIUM Take 1 Cap by mouth two (2) times a day. Per dr Toni Villarreal MELATONIN PO Take  by mouth nightly as needed. ondansetron 8 mg disintegrating tablet Commonly known as:  ZOFRAN ODT Take 0.5-1 Tabs by mouth every eight (8) hours as needed for Nausea. OS-JESSICA 500 + D3 PO Take  by mouth. PERCOCET 7.5-325 mg per tablet Generic drug:  oxyCODONE-acetaminophen Take 1-2 Tabs by mouth every four (4) hours as needed for Pain. Max Daily Amount: 12 Tabs. VITAMIN B-12 1,000 mcg tablet Generic drug:  cyanocobalamin Take 1,000 mcg by mouth daily. * Notice: This list has 2 medication(s) that are the same as other medications prescribed for you. Read the directions carefully, and ask your doctor or other care provider to review them with you. We Performed the Following MAGNESIUM [id number] CPT(R)] METABOLIC PANEL, COMPREHENSIVE [37140 CPT(R)] PHOSPHORUS [55774 CPT(R)] PTH INTACT [02469 CPT(R)] Follow-up Instructions Return in about 6 months (around 12/20/2017). Introducing \[facility]\"" & HEALTH SERVICES! Dear Megha Monroe: Thank you for requesting a Yelp account. Our records indicate that you already have an active Yelp account. You can access your account anytime at [email or web address] Did you know that you can access your hospital and ER discharge instructions at any time in Yelp? You can also review all of your test results from your hospital stay or ER visit. Additional Information If you have questions, please visit the Frequently Asked Questions section of the Yelp website at [email or web address] Remember, Yelp is NOT to be used for urgent needs. For medical emergencies, dial 911. Now available from your iPhone and Android! Please provide this summary of care documentation to your next provider. Your primary care clinician is listed as MARTINEZ HANCOCK. If you have any questions after today's visit, please call [phone].

## 2017-06-21 LAB
ALBUMIN SERPL-MCNC: 4.1 G/DL (ref 3.6–4.8)
ALBUMIN/GLOB SERPL: 1.6 {RATIO} (ref 1.2–2.2)
ALP SERPL-CCNC: 53 IU/L (ref 39–117)
ALT SERPL-CCNC: 16 IU/L (ref 0–32)
AST SERPL-CCNC: 16 IU/L (ref 0–40)
BILIRUB SERPL-MCNC: 0.2 MG/DL (ref 0–1.2)
BUN SERPL-MCNC: 8 MG/DL (ref 8–27)
BUN/CREAT SERPL: 13 (ref 12–28)
CALCIUM SERPL-MCNC: 8.9 MG/DL (ref 8.7–10.3)
CHLORIDE SERPL-SCNC: 103 MMOL/L (ref 96–106)
CO2 SERPL-SCNC: 25 MMOL/L (ref 18–29)
CREAT SERPL-MCNC: 0.6 MG/DL (ref 0.57–1)
GLOBULIN SER CALC-MCNC: 2.5 G/DL (ref 1.5–4.5)
GLUCOSE SERPL-MCNC: 89 MG/DL (ref 65–99)
MAGNESIUM SERPL-MCNC: 2.2 MG/DL (ref 1.6–2.3)
PHOSPHATE SERPL-MCNC: 3.8 MG/DL (ref 2.5–4.5)
POTASSIUM SERPL-SCNC: 4.8 MMOL/L (ref 3.5–5.2)
PROT SERPL-MCNC: 6.6 G/DL (ref 6–8.5)
PTH-INTACT SERPL-MCNC: 50 PG/ML (ref 15–65)
SODIUM SERPL-SCNC: 145 MMOL/L (ref 134–144)

## 2017-11-10 DIAGNOSIS — K21.00 REFLUX ESOPHAGITIS: ICD-10-CM

## 2017-11-11 RX ORDER — ESOMEPRAZOLE MAGNESIUM 40 MG/1
40 CAPSULE, DELAYED RELEASE ORAL 2 TIMES DAILY
Qty: 60 CAP | Refills: 0 | OUTPATIENT
Start: 2017-11-11

## 2017-11-11 NOTE — TELEPHONE ENCOUNTER
Call pt and pharmacy - request should go to Dr Jason Quach.  Also let her know she's due for a follow up

## 2017-11-13 ENCOUNTER — TELEPHONE (OUTPATIENT)
Dept: INTERNAL MEDICINE CLINIC | Age: 62
End: 2017-11-13

## 2017-11-13 NOTE — TELEPHONE ENCOUNTER
Called and spoke to the pt she states refill request was sent to Dr. Jessica Fox by error and she has already contacted the pharmacy regarding this and the med has already been refilled by Dr. Arina Winston.  Pt is now on schedule for f/u ov with Dr. Jessica Fox on tues-11/21 @ 2:20pm.

## 2017-11-13 NOTE — TELEPHONE ENCOUNTER
[phone] pt says for dr Jasmine Andrew to forget about the refill request for nexium, dr Tenisha Quan is filling it for her.

## 2017-11-21 ENCOUNTER — OFFICE VISIT (OUTPATIENT)
Dept: INTERNAL MEDICINE CLINIC | Age: 62
End: 2017-11-21

## 2017-11-21 VITALS
OXYGEN SATURATION: 95 % | DIASTOLIC BLOOD PRESSURE: 78 MMHG | RESPIRATION RATE: 18 BRPM | TEMPERATURE: 98 F | BODY MASS INDEX: 29.37 KG/M2 | HEART RATE: 72 BPM | SYSTOLIC BLOOD PRESSURE: 140 MMHG | HEIGHT: 64 IN | WEIGHT: 172 LBS

## 2017-11-21 DIAGNOSIS — E83.52 HYPERCALCEMIA: ICD-10-CM

## 2017-11-21 DIAGNOSIS — D51.0 PERNICIOUS ANEMIA: ICD-10-CM

## 2017-11-21 DIAGNOSIS — E78.2 MIXED HYPERLIPIDEMIA: ICD-10-CM

## 2017-11-21 DIAGNOSIS — E55.9 VITAMIN D DEFICIENCY: ICD-10-CM

## 2017-11-21 DIAGNOSIS — K21.00 REFLUX ESOPHAGITIS: ICD-10-CM

## 2017-11-21 DIAGNOSIS — E53.8 B12 DEFICIENCY: ICD-10-CM

## 2017-11-21 DIAGNOSIS — D50.8 OTHER IRON DEFICIENCY ANEMIA: ICD-10-CM

## 2017-11-21 DIAGNOSIS — Z12.39 BREAST CANCER SCREENING: ICD-10-CM

## 2017-11-21 DIAGNOSIS — K43.9 VENTRAL HERNIA WITHOUT OBSTRUCTION OR GANGRENE: Primary | ICD-10-CM

## 2017-11-21 DIAGNOSIS — E21.3 HYPERPARATHYROIDISM (HCC): ICD-10-CM

## 2017-11-21 NOTE — MR AVS SNAPSHOT
Visit Information Date & Time Provider Department Dept. Phone Encounter #  
 11/21/2017  2:20 PM Danish Brandt, [facility] [phone] [id number] Follow-up Instructions Return in about 1 year (around 11/21/2018). Your Appointments 12/15/2017  9:30 AM  
ROUTINE CARE with Vanessa Sheldon MD  
[facility] [address] [place] [address]) Appt Note: f/u diabetes cp0.00  
 [address]  
[phone] [id number] [place] [id number] Upcoming Health Maintenance Date Due Pneumococcal 19-64 Medium Risk (1 of 1 - PPSV23) 6/15/1974 BREAST CANCER SCRN MAMMOGRAM 9/16/2016 PAP AKA CERVICAL CYTOLOGY 4/1/2018 COLONOSCOPY 6/1/2020 DTaP/Tdap/Td series (2 - Td) 4/7/2025 Allergies as of 11/21/2017  Review Complete On: 11/21/2017 By: Danish Brandt MD  
  
 Severity Noted Reaction Type Reactions Codeine  03/23/2011    Hives Tolerates Dilaudid Current Immunizations  Reviewed on 5/2/2016 Name Date Influenza Vaccine 10/1/2015 Influenza Vaccine Split 11/9/2012 10:52 AM  
 Tdap 4/7/2015 Zoster Vaccine, Live 4/23/2016 Not reviewed this visit You Were Diagnosed With   
  
 Codes Comments Ventral hernia without obstruction or gangrene    -  Primary ICD-10-CM: K43.9 ICD-9-CM: 553.20 Breast cancer screening     ICD-10-CM: Z12.31 
ICD-9-CM: V76.10 Pernicious anemia     ICD-10-CM: D51.0 ICD-9-CM: 281.0 Vitamin D deficiency     ICD-10-CM: E55.9 ICD-9-CM: 268.9 Reflux esophagitis     ICD-10-CM: K21.0 ICD-9-CM: 530.11 Hypercalcemia     ICD-10-CM: A17.07 
ICD-9-CM: 275.42 Hyperparathyroidism ([facility] [address]     ICD-10-CM: E21.3 ICD-9-CM: 252.00   
 B12 deficiency     ICD-10-CM: E53.8 ICD-9-CM: 266.2 Other iron deficiency anemia     ICD-10-CM: D50.8 ICD-9-CM: 280.8 Mixed hyperlipidemia     ICD-10-CM: E78.2 ICD-9-CM: 272.2 Vitals BP Pulse Temp Resp Height(growth percentile) Weight(growth percentile) 158/78 (BP 1 Location: Right arm, BP Patient Position: Sitting) 72 98 °F (36.7 °C) (Oral) 18 5' 4\" (1.626 m) 172 lb (78 kg) SpO2 BMI OB Status Smoking Status 95% 29.52 kg/m2 Hysterectomy Current Every Day Smoker BMI and BSA Data Body Mass Index Body Surface Area  
 29.52 kg/m 2 1.88 m 2 Preferred Pharmacy Pharmacy Name Phone [facility] [address], [address] [facility] [place] AT [address] [phone] Your Updated Medication List  
  
   
This list is accurate as of: 11/21/17  3:30 PM.  Always use your most recent med list.  
  
  
  
  
 esomeprazole 40 mg capsule Commonly known as:  NexIUM Take 1 Cap by mouth two (2) times a day. Per dr Marko Petit MELATONIN PO Take  by mouth nightly as needed. OS-JESSICA 500 + D3 PO Take  by mouth daily. VITAMIN B-12 1,000 mcg tablet Generic drug:  cyanocobalamin Take 1,000 mcg by mouth daily. We Performed the Following CBC WITH AUTOMATED DIFF [59581 CPT(R)] IRON PROFILE [id number] CPT(R)] LIPID PANEL [36214 CPT(R)] MAGNESIUM [id number] CPT(R)] METABOLIC PANEL, COMPREHENSIVE [01297 CPT(R)] PTH INTACT [66000 CPT(R)] REFERRAL TO GENERAL SURGERY [REF27 Custom] VITAMIN B12 [id number] CPT(R)] VITAMIN D, 25 HYDROXY [id number] CPT(R)] Follow-up Instructions Return in about 1 year (around 11/21/2018). To-Do List   
 12/05/2017 Imaging:  EBER MAMMO BI SCREENING INCL CAD Referral Information Referral ID Referred By Referred To  
  
 [id number] KARISSA [address], [place]   
   [address] [place] [address] [address] [place] [address] [facility] Phone: [phone] Fax: [phone] Visits Status Start Date End Date 1 New Request 11/21/17 11/21/18 If your referral has a status of pending review or denied, additional information will be sent to support the outcome of this decision. Introducing [facility] & [facility] SERVICES! Dear Isamar Luna: Thank you for requesting a Geogoer account. Our records indicate that you already have an active Geogoer account. You can access your account anytime at [email or web address] Did you know that you can access your hospital and ER discharge instructions at any time in Geogoer? You can also review all of your test results from your hospital stay or ER visit. Additional Information If you have questions, please visit the Frequently Asked Questions section of the Geogoer website at [email or web address] Remember, Geogoer is NOT to be used for urgent needs. For medical emergencies, dial 911. Now available from your iPhone and Android! Please provide this summary of care documentation to your next provider. Your primary care clinician is listed as MARTINEZ HANCOCK. If you have any questions after today's visit, please call [phone].

## 2017-11-21 NOTE — PROGRESS NOTES
HISTORY OF PRESENT ILLNESS  Kylie Welch is a 58 y.o. female. GALLO Wright is seen today for follow up of elevated PTH and other concerns. 1. ? Abdominal wall hernia. Have reviewed this and she does have the finding. Will refer to Dr. Cole Chambers with whom she has had previous contact. 2. Hyperparathyroidism. She had surgery and this went well. She has had a change in her calcium dosing. 3. Elevated blood pressure. Improved on recheck. 4. Chronic anemia, B-12 deficiency. Check levels. Family history notable for her brother having a rare condition called [place]-Morales syndrome. Surgical history updated for parathyroid surgery. MedDATA/gwo         Review of Systems   Constitutional: Negative for weight loss. Respiratory: Negative. Cardiovascular: Negative for chest pain, palpitations, leg swelling and PND. Gastrointestinal: Positive for abdominal pain. Musculoskeletal: Negative for myalgias. Neurological: Negative for focal weakness. Physical Exam   Constitutional: She appears well-nourished. Neck: Carotid bruit is not present. Cardiovascular: Normal rate and regular rhythm. Exam reveals no gallop and no friction rub. No murmur heard. Pulmonary/Chest: Effort normal and breath sounds normal. No respiratory distress. Abdominal: Soft. She exhibits no distension. There is tenderness in the periumbilical area. A hernia is present. Hernia confirmed positive in the ventral area. Musculoskeletal: She exhibits no edema. Nursing note and vitals reviewed. ASSESSMENT and PLAN  Diagnoses and all orders for this visit:    1. Ventral hernia without obstruction or gangrene  -     REFERRAL TO GENERAL SURGERY    2. Breast cancer screening  -     EBER MAMMO BI SCREENING INCL CAD; Future    3. Pernicious anemia- check levels    4. Vitamin D deficiency  -     VITAMIN D, 25 HYDROXY    5. Reflux esophagitis- See gastroenterologist as directed     6.  Hypercalcemia  -     METABOLIC PANEL, COMPREHENSIVE    7. Hyperparathyroidism ([facility] [address]  -     PTH INTACT  -     MAGNESIUM    8. B12 deficiency  -     VITAMIN B12    9. Other iron deficiency anemia  -     IRON PROFILE  -     CBC WITH AUTOMATED DIFF    10.  Mixed hyperlipidemia  -     LIPID PANEL

## 2017-12-04 ENCOUNTER — HOSPITAL ENCOUNTER (OUTPATIENT)
Dept: MAMMOGRAPHY | Age: 62
Discharge: HOME | End: 2017-12-04
Attending: INTERNAL MEDICINE
Payer: COMMERCIAL

## 2017-12-04 DIAGNOSIS — Z12.39 BREAST CANCER SCREENING: ICD-10-CM

## 2017-12-04 PROCEDURE — 77067 SCR MAMMO BI INCL CAD: CPT

## 2017-12-05 LAB
25(OH)D3+25(OH)D2 SERPL-MCNC: 25.7 NG/ML (ref 30–100)
ALBUMIN SERPL-MCNC: 4.1 G/DL (ref 3.6–4.8)
ALBUMIN/GLOB SERPL: 1.9 {RATIO} (ref 1.2–2.2)
ALP SERPL-CCNC: 45 IU/L (ref 39–117)
ALT SERPL-CCNC: 14 IU/L (ref 0–32)
AST SERPL-CCNC: 11 IU/L (ref 0–40)
BASOPHILS # BLD AUTO: 0 X10E3/UL (ref 0–0.2)
BASOPHILS NFR BLD AUTO: 0 %
BILIRUB SERPL-MCNC: 0.4 MG/DL (ref 0–1.2)
BUN SERPL-MCNC: 13 MG/DL (ref 8–27)
BUN/CREAT SERPL: 26 (ref 12–28)
CALCIUM SERPL-MCNC: 9 MG/DL (ref 8.7–10.3)
CHLORIDE SERPL-SCNC: 104 MMOL/L (ref 96–106)
CHOLEST SERPL-MCNC: 175 MG/DL (ref 100–199)
CO2 SERPL-SCNC: 31 MMOL/L (ref 18–29)
CREAT SERPL-MCNC: 0.5 MG/DL (ref 0.57–1)
EOSINOPHIL # BLD AUTO: 0.1 X10E3/UL (ref 0–0.4)
EOSINOPHIL NFR BLD AUTO: 1 %
ERYTHROCYTE [DISTWIDTH] IN BLOOD BY AUTOMATED COUNT: 14.2 % (ref 12.3–15.4)
GFR SERPLBLD CREATININE-BSD FMLA CKD-EPI: 104 ML/MIN/1.73
GFR SERPLBLD CREATININE-BSD FMLA CKD-EPI: 120 ML/MIN/1.73
GLOBULIN SER CALC-MCNC: 2.2 G/DL (ref 1.5–4.5)
GLUCOSE SERPL-MCNC: 99 MG/DL (ref 65–99)
HCT VFR BLD AUTO: 42.9 % (ref 34–46.6)
HDLC SERPL-MCNC: 75 MG/DL
HGB BLD-MCNC: 14.1 G/DL (ref 11.1–15.9)
IMM GRANULOCYTES # BLD: 0 X10E3/UL (ref 0–0.1)
IMM GRANULOCYTES NFR BLD: 0 %
IRON SATN MFR SERPL: 30 % (ref 15–55)
IRON SERPL-MCNC: 130 UG/DL (ref 27–139)
LDLC SERPL CALC-MCNC: 82 MG/DL (ref 0–99)
LYMPHOCYTES # BLD AUTO: 1.3 X10E3/UL (ref 0.7–3.1)
LYMPHOCYTES NFR BLD AUTO: 15 %
MAGNESIUM SERPL-MCNC: 2.1 MG/DL (ref 1.6–2.3)
MCH RBC QN AUTO: 29.3 PG (ref 26.6–33)
MCHC RBC AUTO-ENTMCNC: 32.9 G/DL (ref 31.5–35.7)
MCV RBC AUTO: 89 FL (ref 79–97)
MONOCYTES # BLD AUTO: 0.5 X10E3/UL (ref 0.1–0.9)
MONOCYTES NFR BLD AUTO: 5 %
NEUTROPHILS # BLD AUTO: 7 X10E3/UL (ref 1.4–7)
NEUTROPHILS NFR BLD AUTO: 79 %
PLATELET # BLD AUTO: 252 X10E3/UL (ref 150–379)
POTASSIUM SERPL-SCNC: 5.1 MMOL/L (ref 3.5–5.2)
PROT SERPL-MCNC: 6.3 G/DL (ref 6–8.5)
PTH-INTACT SERPL-MCNC: 26 PG/ML (ref 15–65)
RBC # BLD AUTO: 4.82 X10E6/UL (ref 3.77–5.28)
SODIUM SERPL-SCNC: 144 MMOL/L (ref 134–144)
TIBC SERPL-MCNC: 428 UG/DL (ref 250–450)
TRIGL SERPL-MCNC: 91 MG/DL (ref 0–149)
UIBC SERPL-MCNC: 298 UG/DL (ref 118–369)
VIT B12 SERPL-MCNC: 1144 PG/ML (ref 232–1245)
VLDLC SERPL CALC-MCNC: 18 MG/DL (ref 5–40)
WBC # BLD AUTO: 8.9 X10E3/UL (ref 3.4–10.8)

## 2017-12-15 ENCOUNTER — OFFICE VISIT (OUTPATIENT)
Dept: ENDOCRINOLOGY | Age: 62
End: 2017-12-15

## 2017-12-15 VITALS
HEIGHT: 64 IN | DIASTOLIC BLOOD PRESSURE: 81 MMHG | HEART RATE: 70 BPM | WEIGHT: 175 LBS | SYSTOLIC BLOOD PRESSURE: 128 MMHG | BODY MASS INDEX: 29.88 KG/M2 | RESPIRATION RATE: 16 BRPM

## 2017-12-15 DIAGNOSIS — E55.9 VITAMIN D DEFICIENCY: ICD-10-CM

## 2017-12-15 DIAGNOSIS — Z86.39 H/O HYPERPARATHYROIDISM: Primary | ICD-10-CM

## 2017-12-15 DIAGNOSIS — M80.00XS OSTEOPOROSIS WITH CURRENT PATHOLOGICAL FRACTURE, UNSPECIFIED OSTEOPOROSIS TYPE, SEQUELA: ICD-10-CM

## 2017-12-15 RX ORDER — FERROUS SULFATE, DRIED 160(50) MG
TABLET, EXTENDED RELEASE ORAL
COMMUNITY
Start: 2017-07-22 | End: 2017-12-15 | Stop reason: ALTCHOICE

## 2017-12-15 RX ORDER — MELATONIN
1000 DAILY
Qty: 90 TAB | Refills: 3 | Status: SHIPPED | OUTPATIENT
Start: 2017-12-15 | End: 2019-02-11 | Stop reason: SDUPTHER

## 2017-12-15 RX ORDER — CALCIUM CARBONATE 600 MG
600 TABLET ORAL DAILY
Qty: 90 TAB | Refills: 3 | Status: SHIPPED | OUTPATIENT
Start: 2017-12-15 | End: 2019-02-11 | Stop reason: SDUPTHER

## 2017-12-15 RX ORDER — ESOMEPRAZOLE MAGNESIUM 40 MG/1
CAPSULE, DELAYED RELEASE ORAL
COMMUNITY
Start: 2017-09-29 | End: 2018-03-18

## 2017-12-15 RX ORDER — BROMPHENIRAMINE MALEATE, DEXTROMETHORPHAN HBR, PHENYLEPHRINE HCL, DIPHENHYDRAMINE HCL, PHENYLEPHRINE HCL 0.52G
1 KIT ORAL DAILY
Qty: 90 CAP | Refills: 3 | Status: SHIPPED | OUTPATIENT
Start: 2017-12-15 | End: 2019-02-11

## 2017-12-15 RX ORDER — CHOLECALCIFEROL (VITAMIN D3) 125 MCG
CAPSULE ORAL
COMMUNITY
End: 2017-12-15 | Stop reason: SDUPTHER

## 2017-12-15 NOTE — PROGRESS NOTES
Lab Results   Component Value Date/Time    TSH 1.210 02/21/2017 02:39 PM    T4, Free 1.09 02/21/2017 02:39 PM

## 2017-12-15 NOTE — PROGRESS NOTES
Endocrinology Visit    Chief Complaint: hypercalcemia    History of Present Illness:  Sarath Stallworth is a 58 y.o. female with h/o obesity s/p gastric bypass and osteoporosis who returns for hypercalcemia. I saw her in initial consultation in February at which time I started a work-up and confirmed primary hyperparathyroidism. Sestamibi failed to identify a candidate but given her osteoporosis, I referred her to Dr Lisa Lemus for 4 gland exploration. She underwent surgery on 5/12/17 and the right inferior parathyroid gland was removed. Intra-op PTH decreased from 201 to 12. She reports feeling much better since the surgery, feels her energy level and overall wellbeing is improved. She is still taking Oscal with Ca+D, just one per day. Denies face twitching, increase in muscle cramps, or wm-oral tingling. She does have an occasional \"Charley horse\" at night. She has a history of lactose intolerance so rarely eats ice cream or milk. Has cheese on occasion. She has a history of osteoporosis based on a DXA in 2014 and received her first infusion of Reclast in May 2016. She did have a recent traumatic toe fracture in October - says this is healed now. She has fractured both shoulders (possibly humeral) s/p ORIF after mechanical falls. Risk factors for osteoporosis include premature menopause - underwent a DEYSI in her early 35s for early stage cervical cancer. Weight is stable, about 100 lbs less than it was prior to her gastric bypass. Review of Systems: as above, otherwise 7 pt review is negative    Problem List:  Patient Active Problem List   Diagnosis Code    Closed fracture of left proximal humerus S42.202A    Post op infection T81. 4XXA    Cellulitis of shoulder L03.119    Insomnia, unspecified G47.00    Grief reaction F43.20    Pernicious anemia D51.0    Vitamin D deficiency E55.9    Reflux esophagitis K21.0    Gastric ulcer K25.9    Symptomatic menopausal or female climacteric states N95.1    Osteoporosis M81.0    Iron deficiency anemia, unspecified D50.9    Onychomycosis B35.1    Active advance directive on file Z78.9    Hypercalcemia E83.52    H/O hyperparathyroidism Z86.39    Broken toe S92.919A    B12 deficiency E53.8       Past Medical History:    Past Medical History:   Diagnosis Date    Arthritis     OSTEO    Broken toe 2017    right pinky toe    Chronic pain     LT. ARM    GERD (gastroesophageal reflux disease)     History of blood transfusion 1995    [place], NO REACTION; needed transfusion from surgery-ovarian cyst and hit artery per pt.  Infection 2012    left shoulder    Morbid obesity ([address] 3/23/2011    PUD (peptic ulcer disease)        Past Surgical History:  Past Surgical History:   Procedure Laterality Date    BIOPSY/EXCISION, LYMPH NODE(S)      BREAST SURGERY PROCEDURE UNLISTED      LT. TUMOR BENIGN REMOVED AGE 18    ENDOCRINE SURGERY PROC UNLISTED      HX ABDOMINOPLASTY  2006    HX ADENOIDECTOMY      HX CARPAL TUNNEL RELEASE  2001    RIGHT    HX CHOLECYSTECTOMY  2000    HX COLONOSCOPY      2014, due 17 vs 19    HX GASTRIC BYPASS  12-13-00    dr. Lyndsey Sharp HX GI  2016    endoscopy, colonoscopy    HX HEENT  1995    TUMOR REMOVED neck (benign)    HX HYSTERECTOMY  1993    HX ORTHOPAEDIC      CYCST REMOVED BASE OF SPINE AGE 23    HX ORTHOPAEDIC  [id number]    RIGHT ANKLE    HX ORTHOPAEDIC  4/2012    LT.  SHOULDER AND HUMERUS FX, SCREW AND PLATE ( has been removed)    HX ORTHOPAEDIC Right 2014    shoulder fx, orif    HX OTHER SURGICAL      TUMOR REMOVED RIGHT UPPER FLANK AREA    HX TONSIL AND ADENOIDECTOMY      AGE7    HX TONSILLECTOMY      HX TUBAL LIGATION  1981    LEG/ANKLE SURGERY PROC UNLISTED      right ankle cartilage    LEG/ANKLE SURGERY PROC UNLISTED  2009     ankle       Social History:  Social History     Social History    Marital status:      Spouse name: N/A    Number of children: N/A    Years of education: N/A     Occupational History    Not on file. Social History Main Topics    Smoking status: Current Every Day Smoker     Packs/day: 0.50     Years: 7.00     Types: Cigarettes    Smokeless tobacco: Never Used    Alcohol use 2.4 oz/week     1 Glasses of wine, 3 Cans of beer per week      Comment: 6 PACK/every 2 weeks    Drug use: No    Sexual activity: Not on file     Other Topics Concern    Not on file     Social History Narrative       Family History:  Family History   Problem Relation Age of Onset    Other Mother      hypoglycemia, obese,Iza corey syndrome    Cancer Mother      LUNG    Lung Disease Mother     Cancer Father      GENERALIZED    Other Father      Corrinne Beery corey syndrome gene trait    Asthma Sister     Lung Disease Sister     Hypertension Brother     Lung Disease Brother     Diabetes Brother     Thyroid Disease Brother     Other Brother      Iza corey syndrome    Heart Disease Maternal Aunt     Cancer Maternal Uncle      UNKNOWN    Cancer Paternal Aunt      BREAST    Cancer Paternal Uncle     Heart Disease Maternal Grandmother     Heart Disease Maternal Grandfather     Heart Disease Paternal Grandmother     Heart Disease Paternal Grandfather     Cancer Paternal Uncle     Lung Disease Brother     Anesth Problems Neg Hx        Medications:     Current Outpatient Prescriptions:     esomeprazole (NEXIUM) 40 mg capsule, TK ONE C PO  BID, Disp: , Rfl:     CALCIUM CARBONATE/VITAMIN D3 (OS-JESSICA 500 + D3 PO), Take  by mouth daily. , Disp: , Rfl:     MELATONIN PO, Take  by mouth nightly as needed. , Disp: , Rfl:     cyanocobalamin (VITAMIN B-12) 1,000 mcg tablet, Take 1,000 mcg by mouth daily. , Disp: , Rfl:     calcium-vitamin D (OYSTER SHELL) 500 mg(1,250mg) -200 unit per tablet, , Disp: , Rfl:     Allergies:   Allergies   Allergen Reactions    Codeine Hives     Tolerates Dilaudid       Physical Examination:  Visit Vitals    /81    Pulse 70    Resp 16    Ht 5' 4\" (1.626 m)    Wt 175 lb (79.4 kg)    BMI 30.04 kg/m2     Gen: no acute distress  HEENT: mucous membranes moist  Thyroid: well healed incision at base of neck, no thyromegaly  CAD: normal rate, regular rhythm. No murmur rubs or gallops  PULM: clear to ausculation, no wheezes, rhonchis or rales. EXT: no clubbing, cyanosis or edema  Neuro: grossly non focal, normal DTRs  Psych: pleasant, good insight into medical hx  Skin: warm, dry, no rashes    Clinical Data Review:    Lab Results   Component Value Date/Time    Calcium 9.0 12/04/2017 12:00 AM    Phosphorus 3.8 06/20/2017 03:34 PM    PTH, Intact 26 12/04/2017 12:00 AM      Lab Results   Component Value Date/Time    Vitamin D 25-Hydroxy 14.2 04/15/2011 10:35 AM    VITAMIN D, 25-HYDROXY 25.7 12/04/2017 12:00 AM         NM Parathyroid Scan  FINDINGS:  The initial images demonstrate physiologic tracer uptake in the salivary glands,  thyroid, and myocardium.     The delayed images demonstrate no abnormal tracer activity in the neck or chest  to suggest parathyroid adenoma.     IMPRESSION:  No evidence of parathyroid adenoma. Thyroid/Parathyroid US  FINDINGS: The right thyroid lobe measures 4.4 x 2.0 x 1.5 cm. The left thyroid  lobe measures 4.1 x 1.6 x 1.1 cm. The isthmus measures 0.2 cm in AP thickness.     The thyroid gland is normal in size, echogenicity, and vascularity. No thyroid  nodule or calcification is demonstrated. No normal or abnormal parathyroid gland  is identified.     IMPRESSION:   Normal thyroid ultrasound. Pathology May 2017  FINAL PATHOLOGIC DIAGNOSIS   Parathyroid, right inferior, parathyroidectomy:   Hypercellular parathyroid tissue consistent with adenoma.      DXA May 2014  Findings:  Fractures identified on Lateral scanogram: None     Lumbar spine: L1-L4  Bone mineral density (gm/cm2): 0.786  % of peak bone mass: 66  % for age matched controls: 79  T score: -3.3  Z score: -2.8     Hip: Left femoral neck  Bone mineral density (gm/cm2): 0.771  % of peak bone mass: 75  % for age matched controls: 80  T score: -1.9  Z score: -1.1     Hip: Right femoral neck  Bone mineral density (gm/cm2): 0.726  % of peak bone mass: 70  % for age matched controls: 66  T score: -2.2  Z score: -1.5     Left forearm  Bone mineral density (gm/cm2): 0.578  % of peak bone mass: 65  % for age matched controls: 70  T score: -3.5  Z score: -2.7      IMPRESSION:  This patient is osteoporotic using the World Health Organization criteria  A direct comparison to last exam cannot be made due to differences in   machinery. There has likely been a significant decrease in bone mineral   density. 10 year probability of major osteoporotic fracture: 17%  10 year probability of hip fracture: 4.2%    Assessment and Plan:  Patient is a 58 y.o. female here for h/o hypercalcemia due to primary hyperparathyroidism s/p right inferior parathyroidectomy in May 2017. The surgical indication was metabolic bone disease (DXA c/w primary hyperparathyroidism given the most negative T-score at the distal 1/3 radius). I am holding additional zolindronic acid treatments for now due to potential for hungry bone syndrome. Will repeat DXA in July 2018 prior to her next f/u. May resume bisphosphonate therapy depending on results (I expect some improvement after surgical correction of hyperPTH). Advised her to increase cholecalciferol to 1000 IU daily and continue calcium carbonate 600mg daily. Recheck levels in July prior to f/u visit. I spent 25 minutes with the patient today and > 50% of the time was spent counseling the patient about causes of hypercalcemia, work-up and potential treatment. She will return in 8 months time. Thank you for the opportunity to participate in this patient's care.     Dory De León MD  [facility]  [facility]

## 2017-12-15 NOTE — MR AVS SNAPSHOT
Visit Information Date & Time Provider Department Dept. Phone Encounter #  
 12/15/2017  9:30 AM Manny Polanco, [address] [facility] [phone] [id number] Your Appointments 12/27/2017  8:40 AM  
New Patient with Inez Gilbert MD  
[facility] Dr. Monster Gonzales ([facility]) Appt Note: eval ventral hernia/Dr. Ramirez Broderick/Notes in CC/CP$?/ Mailed H&P  
 [address] [facility], [address] [facility], [address] [phone]  
[address] [facility], [address] [facility] [address] [phone] Upcoming Health Maintenance Date Due Pneumococcal 19-64 Medium Risk (1 of 1 - PPSV23) 6/15/1974 PAP AKA CERVICAL CYTOLOGY 4/1/2018 COLONOSCOPY 6/1/2020 DTaP/Tdap/Td series (2 - Td) 4/7/2025 Allergies as of 12/15/2017  Review Complete On: 12/15/2017 By: Hyun Interiano Severity Noted Reaction Type Reactions Codeine  03/23/2011    Hives Tolerates Dilaudid Current Immunizations  Reviewed on 5/2/2016 Name Date Influenza Vaccine 10/1/2015 Influenza Vaccine Split 11/9/2012 10:52 AM  
 Tdap 4/7/2015 Zoster Vaccine, Live 4/23/2016 Not reviewed this visit You Were Diagnosed With   
  
 Codes Comments H/O hyperparathyroidism    -  Primary ICD-10-CM: Z86.39 
ICD-9-CM: V12.29 Osteoporosis with current pathological fracture, unspecified osteoporosis type, sequela     ICD-10-CM: M80.00XS ICD-9-CM: 905.5, 733.00 Vitamin D deficiency     ICD-10-CM: E55.9 ICD-9-CM: 268.9 Vitals BP Pulse Resp Height(growth percentile) Weight(growth percentile) BMI  
 128/81 70 16 5' 4\" (1.626 m) 175 lb (79.4 kg) 30.04 kg/m2 OB Status Smoking Status Hysterectomy Current Every Day Smoker Vitals History BMI and BSA Data Body Mass Index Body Surface Area 30.04 kg/m 2 1.89 m 2 Preferred Pharmacy Pharmacy Name Phone [facility] [address], [address] [facility] AT [address] [phone] Your Updated Medication List  
  
   
This list is accurate as of: 12/15/17  9:54 AM.  Always use your most recent med list.  
  
  
  
  
 calcium carbonate 600 mg calcium (1,500 mg) tablet Commonly known as:  Marky Edwardo Take 1 Tab by mouth daily. cholecalciferol 1,000 unit tablet Commonly known as:  VITAMIN D3 Take 1 Tab by mouth daily. esomeprazole 40 mg capsule Commonly known as:  Emilia Tripathi TK ONE C PO  BID MELATONIN PO Take  by mouth nightly as needed. psyllium 0.52 gram capsule Commonly known as:  METAMUCIL Take 1 Cap by mouth daily. VITAMIN B-12 1,000 mcg tablet Generic drug:  cyanocobalamin Take 1,000 mcg by mouth daily. Prescriptions Sent to Pharmacy Refills  
 cholecalciferol (VITAMIN D3) 1,000 unit tablet 3 Sig: Take 1 Tab by mouth daily. Class: Normal  
 Pharmacy: [facility] [address] [facility], [address] [place] [address] Ph #: [phone] Route: Oral  
 calcium carbonate (CALTREX) 600 mg calcium (1,500 mg) tablet 3 Sig: Take 1 Tab by mouth daily. Class: Normal  
 Pharmacy: [facility] [address] [facility], [address] [place] [address] Ph #: [phone] Route: Oral  
 psyllium (METAMUCIL) 0.52 gram capsule 3 Sig: Take 1 Cap by mouth daily. Class: Normal  
 Pharmacy: [facility] [address] [place] [address], [address] [place] [address] Ph #: [phone] Route: Oral  
  
To-Do List   
 Around 07/15/2018 Imaging:  DEXA BONE DENSITY STUDY AXIAL   
  
 07/15/2018 Lab:  METABOLIC PANEL, COMPREHENSIVE   
  
 07/15/2018 Lab:  PTH INTACT   
  
 07/15/2018 Lab:  VITAMIN D, 25 HYDROXY Introducing [facility] & HEALTH SERVICES!    
 Dear Corbin Guerrero: 
 Thank you for requesting a [employer] account. Our records indicate that you already have an active [employer] account. You can access your account anytime at [email or web address] Did you know that you can access your hospital and ER discharge instructions at any time in [employer]? You can also review all of your test results from your hospital stay or ER visit. Additional Information If you have questions, please visit the Frequently Asked Questions section of the [employer] website at [email or web address] Remember, [employer] is NOT to be used for urgent needs. For medical emergencies, dial 911. Now available from your iPhone and Android! Please provide this summary of care documentation to your next provider. Your primary care clinician is listed as MARTINEZ HANCOCK. If you have any questions after today's visit, please call [phone].

## 2017-12-18 ENCOUNTER — TELEPHONE (OUTPATIENT)
Dept: INTERNAL MEDICINE CLINIC | Age: 62
End: 2017-12-18

## 2017-12-18 NOTE — TELEPHONE ENCOUNTER
MARGARETI -   Pt wanted to let Dr Gema Newman. Know she has an appt with a Dr Kiarra Hopkins, surgeon - today for her hernia.

## 2017-12-28 ENCOUNTER — HOSPITAL ENCOUNTER (OUTPATIENT)
Dept: CT IMAGING | Age: 62
Discharge: HOME | End: 2017-12-28
Attending: SURGERY
Payer: COMMERCIAL

## 2017-12-28 DIAGNOSIS — K43.9 VENTRAL HERNIA WITHOUT OBSTRUCTION OR GANGRENE: ICD-10-CM

## 2017-12-28 DIAGNOSIS — R10.84 ABDOMINAL PAIN, GENERALIZED: ICD-10-CM

## 2017-12-28 PROCEDURE — 74011250636 HC RX REV CODE- 250/636: Performed by: SURGERY

## 2017-12-28 PROCEDURE — 74177 CT ABD & PELVIS W/CONTRAST: CPT

## 2017-12-28 PROCEDURE — 74011636320 HC RX REV CODE- 636/320: Performed by: SURGERY

## 2017-12-28 RX ORDER — BARIUM SULFATE 20 MG/ML
900 SUSPENSION ORAL
Status: ACTIVE | OUTPATIENT
Start: 2017-12-28 | End: 2017-12-28

## 2017-12-28 RX ORDER — SODIUM CHLORIDE 0.9 % (FLUSH) 0.9 %
10 SYRINGE (ML) INJECTION
Status: COMPLETED | OUTPATIENT
Start: 2017-12-28 | End: 2017-12-28

## 2017-12-28 RX ORDER — SODIUM CHLORIDE 9 MG/ML
50 INJECTION, SOLUTION INTRAVENOUS
Status: COMPLETED | OUTPATIENT
Start: 2017-12-28 | End: 2017-12-28

## 2017-12-28 RX ADMIN — Medication 10 ML: at 06:48

## 2017-12-28 RX ADMIN — IOPAMIDOL 100 ML: 755 INJECTION, SOLUTION INTRAVENOUS at 06:48

## 2017-12-28 RX ADMIN — SODIUM CHLORIDE 50 ML/HR: 900 INJECTION, SOLUTION INTRAVENOUS at 06:48

## 2018-01-15 ENCOUNTER — OFFICE VISIT (OUTPATIENT)
Dept: SURGERY | Age: 63
End: 2018-01-15

## 2018-01-15 VITALS
WEIGHT: 174.2 LBS | HEIGHT: 64 IN | BODY MASS INDEX: 29.74 KG/M2 | TEMPERATURE: 97.9 F | RESPIRATION RATE: 20 BRPM | OXYGEN SATURATION: 98 % | HEART RATE: 71 BPM | DIASTOLIC BLOOD PRESSURE: 93 MMHG | SYSTOLIC BLOOD PRESSURE: 153 MMHG

## 2018-01-15 DIAGNOSIS — R10.13 EPIGASTRIC PAIN: Primary | ICD-10-CM

## 2018-01-15 NOTE — PROGRESS NOTES
Discussed advanced directive. Patient states that she does not have an advanced directive. 1. Have you been to the ER, urgent care clinic since your last visit? Hospitalized since your last visit? No    2. Have you seen or consulted any other health care providers outside of the [address] [place] [address] since your last visit? No  Include any pap smears or colon screening.

## 2018-01-15 NOTE — MR AVS SNAPSHOT
Visit Information Date & Time Provider Department Dept. Phone Encounter #  
 1/15/2018  9:40 AM Masha Baldwin MD [facility] [id number] Your Appointments 8/10/2018  9:30 AM  
ROUTINE CARE with Ynes Miller MD  
[facility] [address] [facility]) Appt Note: 6 month f/u  
 [address] [place] [address]  
[address] [place] [id number] Upcoming Health Maintenance Date Due Pneumococcal 19-64 Medium Risk (1 of 1 - PPSV23) 6/15/1974 PAP AKA CERVICAL CYTOLOGY 4/1/2018 BREAST CANCER SCRN MAMMOGRAM 12/4/2019 COLONOSCOPY 6/1/2020 DTaP/Tdap/Td series (2 - Td) 4/7/2025 Allergies as of 1/15/2018  Review Complete On: 1/15/2018 By: Masha Baldwin MD  
  
 Severity Noted Reaction Type Reactions Codeine  03/23/2011    Hives Tolerates Dilaudid Current Immunizations  Reviewed on 5/2/2016 Name Date Influenza Vaccine 10/1/2015 Influenza Vaccine Split 11/9/2012 10:52 AM  
 Tdap 4/7/2015 Zoster Vaccine, Live 4/23/2016 Not reviewed this visit You Were Diagnosed With   
  
 Codes Comments Epigastric pain    -  Primary ICD-10-CM: R10.13 ICD-9-CM: 789.06 Vitals BP Pulse Temp Resp Height(growth percentile) Weight(growth percentile) (!) 153/93 (BP 1 Location: Left arm, BP Patient Position: Sitting) 71 97.9 °F (36.6 °C) (Oral) 20 5' 4\" (1.626 m) 174 lb 3.2 oz (79 kg) SpO2 BMI OB Status Smoking Status 98% 29.9 kg/m2 Hysterectomy Current Every Day Smoker BMI and BSA Data Body Mass Index Body Surface Area  
 29.9 kg/m 2 1.89 m 2 Preferred Pharmacy Pharmacy Name Phone [facility] [address], [address] [facility] AT [address] [phone] Your Updated Medication List  
  
   
 This list is accurate as of: 1/15/18 11:27 AM.  Always use your most recent med list.  
  
  
  
  
 calcium carbonate 600 mg calcium (1,500 mg) tablet Commonly known as:  [place] Sandifer Take 1 Tab by mouth daily. cholecalciferol 1,000 unit tablet Commonly known as:  VITAMIN D3 Take 1 Tab by mouth daily. esomeprazole 40 mg capsule Commonly known as:  Cleo Grounds TK ONE C PO  BID MELATONIN PO Take  by mouth nightly as needed. psyllium 0.52 gram capsule Commonly known as:  METAMUCIL Take 1 Cap by mouth daily. VITAMIN B-12 1,000 mcg tablet Generic drug:  cyanocobalamin Take 1,000 mcg by mouth daily. To-Do List   
 01/15/2018 Imaging:  XR UPPER GI SERIES W KUB   
  
 07/03/2018 2:30 PM  
(Arrive by 2:15 PM) Appointment with Coretta Clifford at [facility] ([phone]) Please, no calcium supplements or antacids that coat the stomach (ex: Tums, Mylanta) 24 hours prior to procedure. Maintain normal diet and medications. Dairy products are allowed. Wear an outfit with an elastic waistband (no zipper or metal snaps). Check in at registration 15min before your appointment time unless you were instructed to do otherwise. Please arrive 15 minutes prior to appointment to register. Introducing [facility] & HEALTH SERVICES! Dear Deon Calles: Thank you for requesting a [employer] account. Our records indicate that you already have an active [employer] account. You can access your account anytime at [email or web address] Did you know that you can access your hospital and ER discharge instructions at any time in [employer]? You can also review all of your test results from your hospital stay or ER visit. Additional Information If you have questions, please visit the Frequently Asked Questions section of the [employer] website at [email or web address] Remember, [employer] is NOT to be used for urgent needs.  For medical emergencies, dial 911. Now available from your iPhone and Android! Please provide this summary of care documentation to your next provider. Your primary care clinician is listed as MARTINEZ HANCOCK. If you have any questions after today's visit, please call [phone].

## 2018-01-22 ENCOUNTER — HOSPITAL ENCOUNTER (OUTPATIENT)
Dept: GENERAL RADIOLOGY | Age: 63
Discharge: HOME | End: 2018-01-22
Attending: SURGERY
Payer: COMMERCIAL

## 2018-01-22 DIAGNOSIS — R10.13 EPIGASTRIC PAIN: ICD-10-CM

## 2018-01-22 PROCEDURE — 74241 XR UPPER GI SERIES W KUB: CPT

## 2018-01-23 NOTE — PROGRESS NOTES
To: Dr. Ale Reyes  CC: Barrington Arora MD    From: Manish Hess MD    Thank you for sending Xochitl Duggan to see us. Encounter Date: 1/15/2018  History and Physical    Assessment:   Incisional hernias conformed with ultrasound. They contain adipose only. Mild sxs. She also had intermittent severe SS pain that could be due to reflux. It is possible that she could have anastomotic stricture or ulcer contributing. Body mass index is 29.9 kg/(m^2). Plan: Will send her for UGI to eval for reflux and or stricture. The hernias can be repaired but these sxs are lesser. Will discuss with her after I have had a chance to review the images from UGI.      HPI:   Ramo Ferrari is a 58 y.o. female who is seen in consultation at the request of Dr. Ale Reyes for incisional hernia. He had gastric bypass in 2000. Had his gallbladder removed at the same time. She's previously had hysterectomy and tubal ligation. Abdominoplasty in 2006. She has been having problems for over a year. Sometimes wakes with excruciating SS pain in the middle of the night. Feels like a heart attack, but that been checked out. Sometimes, she'll eat then feel bad until she vomits. She has had EGD a year ago by Dr. Maria Del Carmen Monzon. She also get pain near her belly button. Its dull and aching. Patient has a bulge. Bulge is reducible. Patient has urinary symptoms -- frequency. Patient does not have difficulty with bowel movements. Patient has nausea or vomiting. Patient does not have history of previous hernia surgery. Past Medical History:   Diagnosis Date    Arthritis     OSTEO    Broken toe 2017    right pinky toe    Chronic pain     LT. ARM    GERD (gastroesophageal reflux disease)     History of blood transfusion 1995    [place], NO REACTION; needed transfusion from surgery-ovarian cyst and hit artery per pt.     Infection 2012    left shoulder    Morbid obesity ([address] 3/23/2011    PUD (peptic ulcer disease)      Past Surgical History:   Procedure Laterality Date    BIOPSY/EXCISION, LYMPH NODE(S)      BREAST SURGERY PROCEDURE UNLISTED      LT. TUMOR BENIGN REMOVED AGE 18    ENDOCRINE SURGERY PROC UNLISTED      HX ABDOMINOPLASTY  2006    HX ADENOIDECTOMY      HX CARPAL TUNNEL RELEASE  2001    RIGHT    HX CHOLECYSTECTOMY  2000    HX COLONOSCOPY      2014, due 17 vs 19    HX GASTRIC BYPASS  12-13-00    dr. Teri Potter [facility] GI  2016    endoscopy, colonoscopy    HX HEENT  1995    TUMOR REMOVED neck (benign)    HX HYSTERECTOMY  1993    HX ORTHOPAEDIC      CYCST REMOVED BASE OF SPINE AGE 23    HX ORTHOPAEDIC  [id number]    RIGHT ANKLE    HX ORTHOPAEDIC  4/2012    LT.  SHOULDER AND HUMERUS FX, SCREW AND PLATE ( has been removed)    HX ORTHOPAEDIC Right 2014    shoulder fx, orif    HX OTHER SURGICAL      TUMOR REMOVED RIGHT UPPER FLANK AREA    HX TONSIL AND ADENOIDECTOMY      AGE5    HX TONSILLECTOMY      HX TUBAL LIGATION  1981    LEG/ANKLE SURGERY [address] UNLISTED      right ankle cartilage    LEG/ANKLE SURGERY PROC UNLISTED  2009     ankle      Family History   Problem Relation Age of Onset    Other Mother      hypoglycemia, obese,Lindsay corey syndrome    Cancer Mother      LUNG    Lung Disease Mother     Cancer Father      GENERALIZED    Other Father      Ceasar Console corey syndrome gene trait    Asthma Sister     Lung Disease Sister     Hypertension Brother     Lung Disease Brother     Diabetes Brother     Thyroid Disease Brother     Other Brother      Lindsay corey syndrome    Heart Disease Maternal Aunt     Cancer Maternal Uncle      UNKNOWN    Cancer Paternal Aunt      BREAST    Cancer Paternal Uncle     Heart Disease Maternal Grandmother     Heart Disease Maternal Grandfather     Heart Disease Paternal Grandmother     Heart Disease Paternal Grandfather     Cancer Paternal Uncle     Lung Disease Brother     Anesth Problems Neg Hx Social History   Substance Use Topics    Smoking status: Current Every Day Smoker     Packs/day: 0.50     Years: 7.00     Types: Cigarettes    Smokeless tobacco: Never Used    Alcohol use 2.4 oz/week     1 Glasses of wine, 3 Cans of beer per week      Comment: 6 PACK/every 2 weeks      Current Outpatient Prescriptions   Medication Sig    esomeprazole (NEXIUM) 40 mg capsule TK ONE C PO  BID    cholecalciferol (VITAMIN D3) 1,000 unit tablet Take 1 Tab by mouth daily.  calcium carbonate (CALTREX) 600 mg calcium (1,500 mg) tablet Take 1 Tab by mouth daily.  MELATONIN PO Take  by mouth nightly as needed.  cyanocobalamin (VITAMIN B-12) 1,000 mcg tablet Take 1,000 mcg by mouth daily.  psyllium (METAMUCIL) 0.52 gram capsule Take 1 Cap by mouth daily. No current facility-administered medications for this visit. Allergies: Allergies   Allergen Reactions    Codeine Hives     Tolerates Dilaudid        Review of Systems:  10 systems reviewed. See scanned sheet in \"Media\" section. See HPI for pertinent positives and negatives. Objective:     Visit Vitals    BP (!) 153/93 (BP 1 Location: Left arm, BP Patient Position: Sitting)    Pulse 71    Temp 97.9 °F (36.6 °C) (Oral)    Resp 20    Ht 5' 4\" (1.626 m)    Wt 79 kg (174 lb 3.2 oz)    SpO2 98%    BMI 29.9 kg/m2       Physical Exam:  General appearance  Alert, cooperative, no distress, appears stated age   HEENT Anicteric   Neck Supple       Lungs   Clear to auscultation bilaterally   Heart  Regular rate and rhythm. No murmur, rub or gallop   Abdomen   Soft. Bowel sounds normal. No organomegaly. 2 fat-containing periumbilical incisional hernias. Many scars.      Extremities no cyanosis or edema   Pulses 2+ right radial   Skin Skin color, texture, turgor normal.   Lymph nodes Inguinal nodes normal.   Neurologic Without overt sensory or motor deficit     Signed By: Lisbet Ruiz MD     January 23, 2018

## 2018-01-26 ENCOUNTER — TELEPHONE (OUTPATIENT)
Dept: SURGERY | Age: 63
End: 2018-01-26

## 2018-01-26 NOTE — TELEPHONE ENCOUNTER
Called spoke to patient. Dr. Jacqui Fothergill will review and call her on Monday. She is fine with plan.

## 2018-01-26 NOTE — TELEPHONE ENCOUNTER
Patient is calling to see if results are in from her test on Monday.  Please return call to either her [phone], or [phone]

## 2018-02-02 NOTE — TELEPHONE ENCOUNTER
Dr. Alanis Michaels spoke to patient. Patient needs to be referred to [facility] surgeons due to gastric bypass surgery. Will get this arranged for her.

## 2018-02-06 ENCOUNTER — OFFICE VISIT (OUTPATIENT)
Dept: SURGERY | Age: 63
End: 2018-02-06

## 2018-02-06 VITALS
HEIGHT: 64 IN | OXYGEN SATURATION: 98 % | WEIGHT: 179 LBS | TEMPERATURE: 98.3 F | DIASTOLIC BLOOD PRESSURE: 80 MMHG | RESPIRATION RATE: 20 BRPM | SYSTOLIC BLOOD PRESSURE: 160 MMHG | BODY MASS INDEX: 30.56 KG/M2 | HEART RATE: 85 BPM

## 2018-02-06 DIAGNOSIS — K21.9 GASTROESOPHAGEAL REFLUX DISEASE WITHOUT ESOPHAGITIS: Primary | ICD-10-CM

## 2018-02-06 DIAGNOSIS — K43.2 INCISIONAL HERNIA, WITHOUT OBSTRUCTION OR GANGRENE: ICD-10-CM

## 2018-02-06 DIAGNOSIS — R10.13 ABDOMINAL PAIN, EPIGASTRIC: ICD-10-CM

## 2018-02-06 NOTE — PROGRESS NOTES
1. Have you been to the ER, urgent care clinic since your last visit? Hospitalized since your last visit? No    2. Have you seen or consulted any other health care providers outside of the [address] [place] [address] since your last visit? Include any pap smears or colon screening.  No

## 2018-02-06 NOTE — TELEPHONE ENCOUNTER
Called spoke to Dr. Jb Reed office. Apt has been made for today @ 2:40. Mrs. Bianca Harkins is aware and accepted appointment.

## 2018-02-07 PROBLEM — K21.9 GASTROESOPHAGEAL REFLUX DISEASE WITHOUT ESOPHAGITIS: Status: ACTIVE | Noted: 2018-02-07

## 2018-02-07 NOTE — PROGRESS NOTES
Surgery Consult    Subjective:      Yareli Saeed is a 58 y.o. female who is being seen for evaluation of GERD. She has a history of open gastric bypass in 2000 (Dr. Domenic Morales); multiple follow-on abdominal procedures. She notes a several year history of GERD symptoms which have gradually worsened. She notes post-prandial epigastric and chest burning pain (5/10, no radiation), partially controlled with Nexium BID. She often experiences regurgitation of sour tasting liquid into her mouth, often at night. She has history of marginal ulcer and continues to smoke (0.5 PPD). She denies recent hematemesis, hoarseness/voice changes, aspiration, or wheezing. Patient Active Problem List    Diagnosis Date Noted    Gastroesophageal reflux disease without esophagitis 02/07/2018    B12 deficiency 11/21/2017    Broken toe     H/O hyperparathyroidism 06/20/2017    Hypercalcemia 02/21/2017    Active advance directive on file 04/18/2016    Onychomycosis 07/23/2014    Osteoporosis 05/13/2014    Iron deficiency anemia, unspecified 05/13/2014    Insomnia, unspecified 04/23/2014    Grief reaction 04/23/2014    Pernicious anemia 04/23/2014    Vitamin D deficiency 04/23/2014    Reflux esophagitis 04/23/2014    Gastric ulcer 04/23/2014    Symptomatic menopausal or female climacteric states 04/23/2014    Cellulitis of shoulder 04/08/2013    Post op infection 11/07/2012    Closed fracture of left proximal humerus 04/25/2012     Past Medical History:   Diagnosis Date    Arthritis     OSTEO    Broken toe 2017    right pinky toe    Chronic pain     LT. ARM    GERD (gastroesophageal reflux disease)     History of blood transfusion 1995    [place], NO REACTION; needed transfusion from surgery-ovarian cyst and hit artery per pt.     Infection 2012    left shoulder    Morbid obesity ([facility] [address] 3/23/2011    PUD (peptic ulcer disease)       Past Surgical History:   Procedure Laterality Date    BIOPSY/EXCISION, LYMPH NODE(S)      BREAST SURGERY PROCEDURE UNLISTED      LT. TUMOR BENIGN REMOVED AGE 25    ENDOCRINE SURGERY PROC UNLISTED      HX ABDOMINOPLASTY  2006    HX ADENOIDECTOMY      HX CARPAL TUNNEL RELEASE  2001    RIGHT    HX CHOLECYSTECTOMY  2000    HX COLONOSCOPY      2014, due 17 vs 19    HX GASTRIC BYPASS  12-13-00    dr. Uriostegui Arn HX GI  2016    endoscopy, colonoscopy    HX HEENT  1995    TUMOR REMOVED neck (benign)    HX HYSTERECTOMY  1993    HX ORTHOPAEDIC      CYCST REMOVED BASE OF SPINE AGE 23    HX ORTHOPAEDIC  2004,2009    RIGHT ANKLE    HX ORTHOPAEDIC  4/2012    LT.  SHOULDER AND HUMERUS FX, SCREW AND PLATE ( has been removed)    HX ORTHOPAEDIC Right 2014    shoulder fx, orif    HX OTHER SURGICAL      TUMOR REMOVED RIGHT UPPER FLANK AREA    HX TONSIL AND ADENOIDECTOMY      AGE7    HX TONSILLECTOMY      HX TUBAL LIGATION  1981    LEG/ANKLE SURGERY PROC UNLISTED      right ankle cartilage    LEG/ANKLE SURGERY PROC UNLISTED  2009     ankle      Social History   Substance Use Topics    Smoking status: Current Every Day Smoker     Packs/day: 0.50     Years: 7.00     Types: Cigarettes    Smokeless tobacco: Never Used    Alcohol use 2.4 oz/week     1 Glasses of wine, 3 Cans of beer per week      Comment: 6 PACK/every 2 weeks      Family History   Problem Relation Age of Onset    Other Mother      hypoglycemia, obese,Iza corey syndrome    Cancer Mother      LUNG    Lung Disease Mother     Cancer Father      GENERALIZED    Other Father      Medina Odessa corey syndrome gene trait    Asthma Sister     Lung Disease Sister     Hypertension Brother     Lung Disease Brother     Diabetes Brother     Thyroid Disease Brother     Other Brother      Iza corey syndrome    Heart Disease Maternal Aunt     Cancer Maternal Uncle      UNKNOWN    Cancer Paternal Aunt      BREAST    Cancer Paternal Uncle     Heart Disease Maternal Grandmother     Heart Disease Maternal Grandfather     Heart Disease Paternal Grandmother     Heart Disease Paternal Grandfather     Cancer Paternal Uncle     Lung Disease Brother     Anesth Problems Neg Hx       Current Outpatient Prescriptions   Medication Sig    esomeprazole (NEXIUM) 40 mg capsule TK ONE C PO  BID    cholecalciferol (VITAMIN D3) 1,000 unit tablet Take 1 Tab by mouth daily.  calcium carbonate (CALTREX) 600 mg calcium (1,500 mg) tablet Take 1 Tab by mouth daily.  psyllium (METAMUCIL) 0.52 gram capsule Take 1 Cap by mouth daily.  MELATONIN PO Take  by mouth nightly as needed.  cyanocobalamin (VITAMIN B-12) 1,000 mcg tablet Take 1,000 mcg by mouth daily. No current facility-administered medications for this visit. Allergies   Allergen Reactions    Codeine Hives     Tolerates Dilaudid       Review of Systems:    Constitutional: negative for fevers, chills and sweats  Eyes: positive for contacts/glasses  Ears, Nose, Mouth, Throat, and Face: negative  Respiratory: positive for dyspnea on exertion, negative for wheezing  Cardiovascular: negative for chest pressure/discomfort, palpitations  Gastrointestinal: positive for dyspepsia, reflux symptoms and abdominal pain, negative for change in bowel habits  Genitourinary:negative for urinary incontinence  Integument/Breast: negative  Hematologic/Lymphatic: negative  Musculoskeletal:positive for arthralgias and stiff joints  Neurological: negative for paresthesia    Objective:        Visit Vitals    /80    Pulse 85    Temp 98.3 °F (36.8 °C)    Resp 20    Ht 5' 4\" (1.626 m)    Wt 179 lb (81.2 kg)    SpO2 98%    BMI 30.73 kg/m2       Physical Exam:  GENERAL: alert, cooperative, no distress, appears stated age, mildly obese, EYE: negative, LYMPH NODES: Cervical, supraclavicular nodes normal.  THROAT & NECK: normal, LUNG: clear to auscultation bilaterally, HEART: regular rate and rhythm, S1, S2 normal, no murmur.  ABDOMEN: NABS, obese, non-distended; reducible tender incisional hernia at umbilical area. EXTREMITIES:  extremities normal, atraumatic, no cyanosis or edema, SKIN: Normal., NEUROLOGIC: negative    Imaging:  reviewed  CT and UGI series    Assessment:     GERD symptoms following gastric bypass. UGI reveals large pouch ( 7 x 7 x 8 cm) with dilated gastrojejunostomy and spontaneous reflux in supine position. She is failing medical management. 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 Also reviewed that revisional bariatric surgery is associated with 3-5 x increased risk of bleeding, infection, leak, conversion to open procedure, injury to surrounding structures. Lastly, discussed tobacco use and affirmed she would need to stop smoking to revision to be considered. Plan:     1. Will order interval upper endoscopy given history of marginal ulcers; will also be able to better measure pouch and anastomotic dimensions. 2. Esophageal manometry to assess esophageal function and determine if fundoplication would be tolerated. 3. Continue Nexium. 4. Tobacco cessation. 5. Follow-up after above. 45 minutes spent with patient (greater than 50% of time in face-face consultation reviewing UGI/CT scan, discussion of revisional surgery, tobacco cessation).       Signed By: Cash Askew MD     February 7, 2018

## 2018-02-07 NOTE — PATIENT INSTRUCTIONS

## 2018-03-13 ENCOUNTER — OFFICE VISIT (OUTPATIENT)
Dept: SURGERY | Age: 63
End: 2018-03-13

## 2018-03-13 VITALS
OXYGEN SATURATION: 20 % | RESPIRATION RATE: 20 BRPM | DIASTOLIC BLOOD PRESSURE: 94 MMHG | SYSTOLIC BLOOD PRESSURE: 150 MMHG | TEMPERATURE: 98.4 F | HEIGHT: 64 IN | WEIGHT: 177.5 LBS | BODY MASS INDEX: 30.3 KG/M2 | HEART RATE: 77 BPM

## 2018-03-13 DIAGNOSIS — K21.9 GASTROESOPHAGEAL REFLUX DISEASE WITHOUT ESOPHAGITIS: Primary | ICD-10-CM

## 2018-03-13 RX ORDER — SUCRALFATE 1 G/1
1 TABLET ORAL 3 TIMES DAILY
COMMUNITY
End: 2018-05-09

## 2018-03-14 NOTE — PATIENT INSTRUCTIONS

## 2018-03-14 NOTE — PROGRESS NOTES
Subjective:      Ramo Ferrari is a 58 y.o. female returns for review of EGD. Appetite is fair. Eating a regular diet with ongoing reflux and regurgitation. Bowel movements are regular. The patient is voiding without difficulty. No hematemesis. Objective:     Visit Vitals    BP (!) 150/94    Pulse 77    Temp 98.4 °F (36.9 °C)    Resp 20    Ht 5' 4\" (1.626 m)    Wt 177 lb 8 oz (80.5 kg)    SpO2 (!) 20%    BMI 30.47 kg/m2       General:  alert, cooperative, no distress, appears stated age, moderately obese   Abdomen: deferred   Incision:   deferred     Endoscopy: Irregular Z-line; no Nicole's on biopsy. Assessment:     GERD, regurgitation following open gastric bypass. UGI reveals large pouch ( 7 x 7 x 8 cm) with dilated gastrojejunostomy and spontaneous reflux in supine position; Endoscopy with irregular Z-line which is associated with acid reflux. She has failed medical management. Discussed surgical options which would include laparoscopic partial pouch resection to decrease parietal cell mass and creation of new gastrojejunostomy of the appropriate size; partial vs full fundoplication using excluded stomach was also discussed. Also reviewed that revisional bariatric surgery is associated with 3-5 x increased risk of bleeding, infection, leak, conversion to open procedure, injury to surrounding structures. She acknowledges understanding of the above issues and desires to proceed. All questions answered. Plan:     1. Continue current medications. 2. Diet as tolerated. 3. Will submit for above procedure.

## 2018-03-18 ENCOUNTER — APPOINTMENT (OUTPATIENT)
Dept: CT IMAGING | Age: 63
End: 2018-03-18
Attending: EMERGENCY MEDICINE
Payer: COMMERCIAL

## 2018-03-18 ENCOUNTER — HOSPITAL ENCOUNTER (OUTPATIENT)
Age: 63
Setting detail: OBSERVATION
Discharge: HOME | End: 2018-03-20
Attending: EMERGENCY MEDICINE | Admitting: INTERNAL MEDICINE
Payer: COMMERCIAL

## 2018-03-18 DIAGNOSIS — K62.5 BRBPR (BRIGHT RED BLOOD PER RECTUM): Primary | ICD-10-CM

## 2018-03-18 LAB
ABO + RH BLD: NORMAL
ALBUMIN SERPL-MCNC: 3.7 G/DL (ref 3.5–5)
ALBUMIN/GLOB SERPL: 1.2 {RATIO} (ref 1.1–2.2)
ALP SERPL-CCNC: 47 U/L (ref 45–117)
ALT SERPL-CCNC: 21 U/L (ref 12–78)
ANION GAP SERPL CALC-SCNC: 6 MMOL/L (ref 5–15)
APPEARANCE UR: CLEAR
APTT PPP: 22.9 SEC (ref 22.1–32)
AST SERPL-CCNC: 11 U/L (ref 15–37)
BACTERIA URNS QL MICRO: NEGATIVE /HPF
BASOPHILS # BLD: 0 K/UL (ref 0–0.1)
BASOPHILS NFR BLD: 0 % (ref 0–1)
BILIRUB SERPL-MCNC: 0.4 MG/DL (ref 0.2–1)
BILIRUB UR QL: NEGATIVE
BLOOD GROUP ANTIBODIES SERPL: NORMAL
BUN SERPL-MCNC: 18 MG/DL (ref 6–20)
BUN/CREAT SERPL: 37 (ref 12–20)
CALCIUM SERPL-MCNC: 8.8 MG/DL (ref 8.5–10.1)
CHLORIDE SERPL-SCNC: 107 MMOL/L (ref 97–108)
CO2 SERPL-SCNC: 30 MMOL/L (ref 21–32)
COLOR UR: ABNORMAL
CREAT SERPL-MCNC: 0.49 MG/DL (ref 0.55–1.02)
DIFFERENTIAL METHOD BLD: ABNORMAL
EOSINOPHIL # BLD: 0.1 K/UL (ref 0–0.4)
EOSINOPHIL NFR BLD: 1 % (ref 0–7)
EPITH CASTS URNS QL MICRO: ABNORMAL /LPF
ERYTHROCYTE [DISTWIDTH] IN BLOOD BY AUTOMATED COUNT: 13.6 % (ref 11.5–14.5)
GLOBULIN SER CALC-MCNC: 3.2 G/DL (ref 2–4)
GLUCOSE SERPL-MCNC: 93 MG/DL (ref 65–100)
GLUCOSE UR STRIP.AUTO-MCNC: NEGATIVE MG/DL
HCT VFR BLD AUTO: 31 % (ref 35–47)
HCT VFR BLD AUTO: 35.5 % (ref 35–47)
HGB BLD-MCNC: 10.3 G/DL (ref 11.5–16)
HGB BLD-MCNC: 11.6 G/DL (ref 11.5–16)
HGB UR QL STRIP: NEGATIVE
HYALINE CASTS URNS QL MICRO: ABNORMAL /LPF (ref 0–5)
IMM GRANULOCYTES # BLD: 0 K/UL (ref 0–0.04)
IMM GRANULOCYTES NFR BLD AUTO: 0 % (ref 0–0.5)
INR PPP: 1 (ref 0.9–1.1)
KETONES UR QL STRIP.AUTO: NEGATIVE MG/DL
LEUKOCYTE ESTERASE UR QL STRIP.AUTO: ABNORMAL
LYMPHOCYTES # BLD: 1.5 K/UL (ref 0.8–3.5)
LYMPHOCYTES NFR BLD: 14 % (ref 12–49)
MCH RBC QN AUTO: 29.8 PG (ref 26–34)
MCHC RBC AUTO-ENTMCNC: 32.7 G/DL (ref 30–36.5)
MCV RBC AUTO: 91.3 FL (ref 80–99)
MONOCYTES # BLD: 0.5 K/UL (ref 0–1)
MONOCYTES NFR BLD: 4 % (ref 5–13)
NEUTS SEG # BLD: 8.8 K/UL (ref 1.8–8)
NEUTS SEG NFR BLD: 80 % (ref 32–75)
NITRITE UR QL STRIP.AUTO: NEGATIVE
NRBC # BLD: 0 K/UL (ref 0–0.01)
NRBC BLD-RTO: 0 PER 100 WBC
PH UR STRIP: 6 [PH] (ref 5–8)
PLATELET # BLD AUTO: 231 K/UL (ref 150–400)
PMV BLD AUTO: 11.8 FL (ref 8.9–12.9)
POTASSIUM SERPL-SCNC: 4 MMOL/L (ref 3.5–5.1)
PROT SERPL-MCNC: 6.9 G/DL (ref 6.4–8.2)
PROT UR STRIP-MCNC: NEGATIVE MG/DL
PROTHROMBIN TIME: 10.3 SEC (ref 9–11.1)
RBC # BLD AUTO: 3.89 M/UL (ref 3.8–5.2)
RBC #/AREA URNS HPF: ABNORMAL /HPF (ref 0–5)
SODIUM SERPL-SCNC: 143 MMOL/L (ref 136–145)
SP GR UR REFRACTOMETRY: 1.01 (ref 1–1.03)
SPECIMEN EXP DATE BLD: NORMAL
THERAPEUTIC RANGE,PTTT: NORMAL SECS (ref 58–77)
UROBILINOGEN UR QL STRIP.AUTO: 0.2 EU/DL (ref 0.2–1)
WBC # BLD AUTO: 10.9 K/UL (ref 3.6–11)
WBC URNS QL MICRO: ABNORMAL /HPF (ref 0–4)

## 2018-03-18 PROCEDURE — 74177 CT ABD & PELVIS W/CONTRAST: CPT

## 2018-03-18 PROCEDURE — C9113 INJ PANTOPRAZOLE SODIUM, VIA: HCPCS | Performed by: EMERGENCY MEDICINE

## 2018-03-18 PROCEDURE — 96376 TX/PRO/DX INJ SAME DRUG ADON: CPT

## 2018-03-18 PROCEDURE — 94761 N-INVAS EAR/PLS OXIMETRY MLT: CPT

## 2018-03-18 PROCEDURE — 99282 EMERGENCY DEPT VISIT SF MDM: CPT

## 2018-03-18 PROCEDURE — 36415 COLL VENOUS BLD VENIPUNCTURE: CPT | Performed by: INTERNAL MEDICINE

## 2018-03-18 PROCEDURE — C9113 INJ PANTOPRAZOLE SODIUM, VIA: HCPCS | Performed by: INTERNAL MEDICINE

## 2018-03-18 PROCEDURE — 99218 HC RM OBSERVATION: CPT

## 2018-03-18 PROCEDURE — 85610 PROTHROMBIN TIME: CPT | Performed by: EMERGENCY MEDICINE

## 2018-03-18 PROCEDURE — 81001 URINALYSIS AUTO W/SCOPE: CPT | Performed by: EMERGENCY MEDICINE

## 2018-03-18 PROCEDURE — 74011250636 HC RX REV CODE- 250/636: Performed by: INTERNAL MEDICINE

## 2018-03-18 PROCEDURE — 96374 THER/PROPH/DIAG INJ IV PUSH: CPT

## 2018-03-18 PROCEDURE — 74011250636 HC RX REV CODE- 250/636: Performed by: EMERGENCY MEDICINE

## 2018-03-18 PROCEDURE — 85730 THROMBOPLASTIN TIME PARTIAL: CPT | Performed by: EMERGENCY MEDICINE

## 2018-03-18 PROCEDURE — 86900 BLOOD TYPING SEROLOGIC ABO: CPT | Performed by: EMERGENCY MEDICINE

## 2018-03-18 PROCEDURE — 74011000258 HC RX REV CODE- 258: Performed by: EMERGENCY MEDICINE

## 2018-03-18 PROCEDURE — 85025 COMPLETE CBC W/AUTO DIFF WBC: CPT | Performed by: EMERGENCY MEDICINE

## 2018-03-18 PROCEDURE — 96375 TX/PRO/DX INJ NEW DRUG ADDON: CPT

## 2018-03-18 PROCEDURE — 74178 CT ABD&PLV WO CNTR FLWD CNTR: CPT

## 2018-03-18 PROCEDURE — 74011636320 HC RX REV CODE- 636/320: Performed by: EMERGENCY MEDICINE

## 2018-03-18 PROCEDURE — 85018 HEMOGLOBIN: CPT | Performed by: INTERNAL MEDICINE

## 2018-03-18 PROCEDURE — 80053 COMPREHEN METABOLIC PANEL: CPT | Performed by: EMERGENCY MEDICINE

## 2018-03-18 RX ORDER — SODIUM CHLORIDE 0.9 % (FLUSH) 0.9 %
SYRINGE (ML) INJECTION
Status: DISPENSED
Start: 2018-03-18 | End: 2018-03-18

## 2018-03-18 RX ORDER — SODIUM CHLORIDE 0.9 % (FLUSH) 0.9 %
5-10 SYRINGE (ML) INJECTION EVERY 8 HOURS
Status: DISCONTINUED | OUTPATIENT
Start: 2018-03-18 | End: 2018-03-20 | Stop reason: HOSPADM

## 2018-03-18 RX ORDER — SODIUM CHLORIDE 0.9 % (FLUSH) 0.9 %
5-10 SYRINGE (ML) INJECTION AS NEEDED
Status: DISCONTINUED | OUTPATIENT
Start: 2018-03-18 | End: 2018-03-20 | Stop reason: HOSPADM

## 2018-03-18 RX ORDER — ONDANSETRON 2 MG/ML
4 INJECTION INTRAMUSCULAR; INTRAVENOUS
Status: DISCONTINUED | OUTPATIENT
Start: 2018-03-18 | End: 2018-03-20 | Stop reason: HOSPADM

## 2018-03-18 RX ORDER — SODIUM CHLORIDE 9 MG/ML
75 INJECTION, SOLUTION INTRAVENOUS CONTINUOUS
Status: DISCONTINUED | OUTPATIENT
Start: 2018-03-18 | End: 2018-03-19

## 2018-03-18 RX ORDER — SUCRALFATE 1 G/1
1 TABLET ORAL 4 TIMES DAILY
Status: DISCONTINUED | OUTPATIENT
Start: 2018-03-18 | End: 2018-03-18

## 2018-03-18 RX ORDER — SODIUM CHLORIDE 0.9 % (FLUSH) 0.9 %
10 SYRINGE (ML) INJECTION
Status: COMPLETED | OUTPATIENT
Start: 2018-03-18 | End: 2018-03-18

## 2018-03-18 RX ADMIN — PANTOPRAZOLE SODIUM 40 MG: 40 INJECTION, POWDER, FOR SOLUTION INTRAVENOUS at 20:56

## 2018-03-18 RX ADMIN — ONDANSETRON 4 MG: 2 INJECTION INTRAMUSCULAR; INTRAVENOUS at 17:52

## 2018-03-18 RX ADMIN — Medication 10 ML: at 13:05

## 2018-03-18 RX ADMIN — SODIUM CHLORIDE 100 ML: 900 INJECTION, SOLUTION INTRAVENOUS at 11:11

## 2018-03-18 RX ADMIN — SODIUM CHLORIDE 75 ML/HR: 900 INJECTION, SOLUTION INTRAVENOUS at 13:05

## 2018-03-18 RX ADMIN — IOPAMIDOL 100 ML: 755 INJECTION, SOLUTION INTRAVENOUS at 11:11

## 2018-03-18 RX ADMIN — Medication 10 ML: at 10:48

## 2018-03-18 RX ADMIN — PANTOPRAZOLE SODIUM 40 MG: 40 INJECTION, POWDER, FOR SOLUTION INTRAVENOUS at 09:33

## 2018-03-18 NOTE — PROGRESS NOTES
Admission Medication Reconciliation:    Information obtained from: Patient provided med list    Significant PMH/Disease States:   Past Medical History:   Diagnosis Date    Arthritis     OSTEO    Broken toe 2017    right pinky toe    Chronic pain     LT. ARM    GERD (gastroesophageal reflux disease)     History of blood transfusion 1995    [place], NO REACTION; needed transfusion from surgery-ovarian cyst and hit artery per pt.  Infection 2012    left shoulder    Morbid obesity ([address] 3/23/2011    PUD (peptic ulcer disease)        Chief Complaint for this Admission:  Rectal bleeding    Allergies:  Codeine    Prior to Admission Medications:   Prior to Admission Medications   Prescriptions Last Dose Informant Patient Reported? Taking? DOCUSATE SODIUM (STOOL SOFTENER PO)   Yes No   Sig: Take  by mouth two (2) times a day. MELATONIN PO 2/18/2018 at Unknown time  Yes Yes   Sig: Take 5 mg by mouth nightly as needed. calcium carbonate (CALTREX) 600 mg calcium (1,500 mg) tablet 3/18/2018 at Unknown time  No Yes   Sig: Take 1 Tab by mouth daily. cholecalciferol (VITAMIN D3) 1,000 unit tablet 3/18/2018 at Unknown time  No Yes   Sig: Take 1 Tab by mouth daily. cyanocobalamin (VITAMIN B-12) 1,000 mcg tablet 3/18/2018 at Unknown time  Yes Yes   Sig: Take 1,000 mcg by mouth daily. psyllium (METAMUCIL) 0.52 gram capsule 3/18/2018 at Unknown time  No Yes   Sig: Take 1 Cap by mouth daily. Patient taking differently: Take 2 Caps by mouth daily. sucralfate (CARAFATE) 1 gram tablet   Yes No   Sig: Take 1 g by mouth three (3) times daily. Facility-Administered Medications: None         Comments/Recommendations: Patient is reliable historian, allergies were confirmed. Revised:  1. Metamucil to 2 caps  2. Sucralfate to TID    Deleted:  1. Esomeprazole    Thank you for allowing me to participate in the care of your patient.     Orestes Givens PharmD, RN #[id number]

## 2018-03-18 NOTE — ED NOTES
Pt reports reduced nausea after Zofran administration. Pt asked for diet gingerale and ice chips. They were given, and are being tolerated by pt. Pt is resting comfortably with call bell within reach.

## 2018-03-18 NOTE — H&P
History & Physical    Primary Care Provider: Alfa Palacios MD  Source of Information: Patient    History of Presenting Illness:   Jose Bryan is a 58 y.o. female with medical history of Obesity s/p bypass surgery (17 years back), GERD (on sucralfate), and obese started to having bloody BM since mid night for a total of three times till the time of admission. The bleeding is bright red in nature and covering his toilet seat. Patient had upper endoscopy done 2 weeks back and started on sucralfate, and undergone colonoscopy 2 years back (as per patient with normal findings). The patient denies any fever, chills, chest pain, cough, congestion, recent illness, palpitations, or dysuria. Review of Systems:  A comprehensive review of systems was negative except for that written in the History of Present Illness. Past Medical History:   Diagnosis Date    Arthritis     OSTEO    Broken toe 2017    right pinky toe    Chronic pain     LT. ARM    GERD (gastroesophageal reflux disease)     History of blood transfusion 1995    [place], NO REACTION; needed transfusion from surgery-ovarian cyst and hit artery per pt.     Infection 2012    left shoulder    Morbid obesity ([address] 3/23/2011    PUD (peptic ulcer disease)       Past Surgical History:   Procedure Laterality Date    BIOPSY/EXCISION, LYMPH NODE(S)      BREAST SURGERY PROCEDURE UNLISTED      LT. TUMOR BENIGN REMOVED AGE 18    ENDOCRINE SURGERY PROC UNLISTED      HX ABDOMINOPLASTY  2006    HX ADENOIDECTOMY      HX CARPAL TUNNEL RELEASE  2001    RIGHT    HX CHOLECYSTECTOMY  2000    HX COLONOSCOPY      2014, due 17 vs 19    HX GASTRIC BYPASS  12-13-00    dr. Moody Omalley HX GI  2016    endoscopy, colonoscopy    HX HEENT  1995    TUMOR REMOVED neck (benign)    HX HYSTERECTOMY  1993    HX ORTHOPAEDIC      CYCST REMOVED BASE OF SPINE AGE 23    HX ORTHOPAEDIC  6792,2818 RIGHT ANKLE    HX ORTHOPAEDIC  4/2012    LT. SHOULDER AND HUMERUS FX, SCREW AND PLATE ( has been removed)    HX ORTHOPAEDIC Right 2014    shoulder fx, orif    HX OTHER SURGICAL      TUMOR REMOVED RIGHT UPPER FLANK AREA    HX TONSIL AND ADENOIDECTOMY      AGE7    HX TONSILLECTOMY      HX TUBAL LIGATION  1981    LEG/ANKLE SURGERY PROC UNLISTED      right ankle cartilage    LEG/ANKLE SURGERY PROC UNLISTED  2009     ankle     Prior to Admission medications    Medication Sig Start Date End Date Taking? Authorizing Provider   sucralfate (CARAFATE) 1 gram tablet Take 1 g by mouth four (4) times daily. Historical Provider   DOCUSATE SODIUM (STOOL SOFTENER PO) Take  by mouth two (2) times a day. Historical Provider   esomeprazole (NEXIUM) 40 mg capsule TK ONE C PO  BID 9/29/17   Historical Provider   cholecalciferol (VITAMIN D3) 1,000 unit tablet Take 1 Tab by mouth daily. 12/15/17   Tim Guidry MD   calcium carbonate (CALTREX) 600 mg calcium (1,500 mg) tablet Take 1 Tab by mouth daily. 12/15/17   Tim Guidry MD   psyllium (METAMUCIL) 0.52 gram capsule Take 1 Cap by mouth daily. 12/15/17   Tim Guidry MD   MELATONIN PO Take  by mouth nightly as needed. Historical Provider   cyanocobalamin (VITAMIN B-12) 1,000 mcg tablet Take 1,000 mcg by mouth daily.     Historical Provider     Allergies   Allergen Reactions    Codeine Hives     Tolerates Dilaudid      Family History   Problem Relation Age of Onset    Other Mother      hypoglycemia, obese,Iza corey syndrome    Cancer Mother      LUNG    Lung Disease Mother     Cancer Father      GENERALIZED    Other Father      Danny Parsley ocrey syndrome gene trait    Asthma Sister     Lung Disease Sister     Hypertension Brother     Lung Disease Brother     Diabetes Brother     Thyroid Disease Brother     Other Brother      [place] corey syndrome    Heart Disease Maternal Aunt     Cancer Maternal Uncle      UNKNOWN    Cancer Paternal Aunt      BREAST    Cancer Paternal Uncle     Heart Disease Maternal Grandmother     Heart Disease Maternal Grandfather     Heart Disease Paternal Grandmother     Heart Disease Paternal Grandfather     Cancer Paternal Uncle     Lung Disease Brother     Anesth Problems Neg Hx         SOCIAL HISTORY:  Patient resides:  Independently X   Assisted Living    SNF    With family care       Smoking history:   None X   Former    Chronic      Alcohol history:   None X   Social    Chronic      Ambulates:   Independently X   w/cane    w/walker    w/wc    CODE STATUS:  DNR    Full X   Other      Objective:     Physical Exam:     Visit Vitals    /80 (BP 1 Location: Right arm, BP Patient Position: At rest)    Pulse 75    Temp 98.1 °F (36.7 °C)    Resp 17    Ht 5' 4\" (1.626 m)    Wt 79.8 kg (176 lb)    SpO2 100%    BMI 30.21 kg/m2      O2 Device: Room air    General:  Alert, cooperative, no distress, appears stated age. Head:  Normocephalic, without obvious abnormality, atraumatic. Eyes:  Conjunctivae/corneas clear. PERRL, EOMs intact. Nose: Nares normal. Septum midline. Mucosa normal. No drainage or sinus tenderness. Throat: Lips, mucosa, and tongue normal. Teeth and gums normal.   Neck: Supple, symmetrical, trachea midline, no adenopathy, thyroid: no enlargement/tenderness/nodules, no carotid bruit and no JVD. Back:   Symmetric, no curvature. ROM normal. No CVA tenderness. Lungs:   Clear to auscultation bilaterally. Chest wall:  No tenderness or deformity. Heart:  Regular rate and rhythm, S1, S2 normal, no murmur, click, rub or gallop. Abdomen:   Slight tenderness over the hypogastric area of abdomen, mid line healed surgical scar     Extremities: Extremities normal, atraumatic, no cyanosis or edema. Pulses: 2+ and symmetric all extremities. Skin: Skin color, texture, turgor normal. No rashes or lesions   Neurologic: CNII-XII intact.           EKG:  Not done       Data Review:     Recent Days:  Recent Labs 03/18/18   0849   WBC  10.9   HGB  11.6   HCT  35.5   PLT  231     Recent Labs      03/18/18   0849   NA  143   K  4.0   CL  107   CO2  30   GLU  93   BUN  18   CREA  0.49*   CA  8.8   ALB  3.7   TBILI  0.4   SGOT  11*   ALT  21   INR  1.0     No results for input(s): PH, PCO2, PO2, HCO3, FIO2 in the last 72 hours. 24 Hour Results:  Recent Results (from the past 24 hour(s))   CBC WITH AUTOMATED DIFF    Collection Time: 03/18/18  8:49 AM   Result Value Ref Range    WBC 10.9 3.6 - 11.0 K/uL    RBC 3.89 3.80 - 5.20 M/uL    HGB 11.6 11.5 - 16.0 g/dL    HCT 35.5 35.0 - 47.0 %    MCV 91.3 80.0 - 99.0 FL    MCH 29.8 26.0 - 34.0 PG    MCHC 32.7 30.0 - 36.5 g/dL    RDW 13.6 11.5 - 14.5 %    PLATELET 211 145 - 555 K/uL    MPV 11.8 8.9 - 12.9 FL    NRBC 0.0 0  WBC    ABSOLUTE NRBC 0.00 0.00 - 0.01 K/uL    NEUTROPHILS 80 (H) 32 - 75 %    LYMPHOCYTES 14 12 - 49 %    MONOCYTES 4 (L) 5 - 13 %    EOSINOPHILS 1 0 - 7 %    BASOPHILS 0 0 - 1 %    IMMATURE GRANULOCYTES 0 0.0 - 0.5 %    ABS. NEUTROPHILS 8.8 (H) 1.8 - 8.0 K/UL    ABS. LYMPHOCYTES 1.5 0.8 - 3.5 K/UL    ABS. MONOCYTES 0.5 0.0 - 1.0 K/UL    ABS. EOSINOPHILS 0.1 0.0 - 0.4 K/UL    ABS. BASOPHILS 0.0 0.0 - 0.1 K/UL    ABS. IMM. GRANS. 0.0 0.00 - 0.04 K/UL    DF AUTOMATED     METABOLIC PANEL, COMPREHENSIVE    Collection Time: 03/18/18  8:49 AM   Result Value Ref Range    Sodium 143 136 - 145 mmol/L    Potassium 4.0 3.5 - 5.1 mmol/L    Chloride 107 97 - 108 mmol/L    CO2 30 21 - 32 mmol/L    Anion gap 6 5 - 15 mmol/L    Glucose 93 65 - 100 mg/dL    BUN 18 6 - 20 MG/DL    Creatinine 0.49 (L) 0.55 - 1.02 MG/DL    BUN/Creatinine ratio 37 (H) 12 - 20      GFR est AA >60 >60 ml/min/1.73m2    GFR est non-AA >60 >60 ml/min/1.73m2    Calcium 8.8 8.5 - 10.1 MG/DL    Bilirubin, total 0.4 0.2 - 1.0 MG/DL    ALT (SGPT) 21 12 - 78 U/L    AST (SGOT) 11 (L) 15 - 37 U/L    Alk.  phosphatase 47 45 - 117 U/L    Protein, total 6.9 6.4 - 8.2 g/dL    Albumin 3.7 3.5 - 5.0 g/dL Globulin 3.2 2.0 - 4.0 g/dL    A-G Ratio 1.2 1.1 - 2.2     PROTHROMBIN TIME + INR    Collection Time: 03/18/18  8:49 AM   Result Value Ref Range    INR 1.0 0.9 - 1.1      Prothrombin time 10.3 9.0 - 11.1 sec   PTT    Collection Time: 03/18/18  8:49 AM   Result Value Ref Range    aPTT 22.9 22.1 - 32.0 sec    aPTT, therapeutic range     58.0 - 77.0 SECS   TYPE & SCREEN    Collection Time: 03/18/18  8:49 AM   Result Value Ref Range    Crossmatch Expiration 03/21/2018     ABO/Rh(D) A POSITIVE     Antibody screen NEG    URINALYSIS W/ RFLX MICROSCOPIC    Collection Time: 03/18/18 10:07 AM   Result Value Ref Range    Color YELLOW/STRAW      Appearance CLEAR CLEAR      Specific gravity 1.013 1.003 - 1.030      pH (UA) 6.0 5.0 - 8.0      Protein NEGATIVE  NEG mg/dL    Glucose NEGATIVE  NEG mg/dL    Ketone NEGATIVE  NEG mg/dL    Bilirubin NEGATIVE  NEG      Blood NEGATIVE  NEG      Urobilinogen 0.2 0.2 - 1.0 EU/dL    Nitrites NEGATIVE  NEG      Leukocyte Esterase TRACE (A) NEG      WBC 0-4 0 - 4 /hpf    RBC 0-5 0 - 5 /hpf    Epithelial cells FEW FEW /lpf    Bacteria NEGATIVE  NEG /hpf    Hyaline cast 0-2 0 - 5 /lpf         Imaging:     Assessment:Plan      Active Problems:    BRBPR (bright red blood per rectum) (3/18/2018)    admit to remote telemetry   have two wide bore IV cannulas   Continue with NS 75 ml/hour continuous IV infusion   Continue with pantoprazole 40 mg IV every 12 hours   Continue with sucralfate as per home dose   There is a significant Hgb drop of > 2 gm when compared it from 12/4/2018   Follow Hgb every 12 hours   If significant drop in hgb or Hgb drops than 7 gm will transfuse patient  CT bleeding scan: result pending   GI consulted by ED physician   Rest of treatment plan as per GI     History of GERD   Continue with sucralfate and PPI      History of thyroid surgery   Will send for TSH    DVT prophylaxis with SCD     Code status: Full Code            Signed By: Jasmin Koo MD     March 18, 2018 ]

## 2018-03-18 NOTE — ED TRIAGE NOTES
Rectal bleeding onset MN. Pt reports 2 more episodes at 0300 and 0600. \"The last time I had a BM, it was loose with blood\". Pt saw Dr. Concetta Mcwilliams on 3/13/18 and dx with regurgitation.

## 2018-03-18 NOTE — IP AVS SNAPSHOT
[address] 
[phone] Patient: Yoel Liu MRN: [id number] RRE:[id number] About your hospitalization You were admitted on:  March 18, 2018 You last received care in the:  [place] [address] You were discharged on:  March 20, 2018 Why you were hospitalized Your primary diagnosis was:  Brbpr (Bright Red Blood Per Rectum) Your diagnoses also included:  Diverticulosis Follow-up Information Follow up With Details Comments Contact Info Valente Gardner MD Schedule an appointment as soon as possible for a visit  [address] [facility] [address] [place] [address] 
[phone] Jamarcus Webster MD Schedule an appointment as soon as possible for a visit  [address] [facility] [address] [place] [address] 
[phone] Discharge Orders None A check viktor indicates which time of day the medication should be taken. My Medications START taking these medications Instructions Each Dose to Equal  
 Morning Noon Evening Bedtime  
 ferrous sulfate 325 mg (65 mg iron) EC tablet Commonly known as:  IRON Your last dose was: Your next dose is: Take 1 Tab by mouth three (3) times daily (with meals). 325 mg CHANGE how you take these medications Instructions Each Dose to Equal  
 Morning Noon Evening Bedtime  
 psyllium 0.52 gram capsule Commonly known as:  METAMUCIL What changed:  how much to take Your last dose was: Your next dose is: Take 1 Cap by mouth daily. 1 Cap CONTINUE taking these medications Instructions Each Dose to Equal  
 Morning Noon Evening Bedtime  
 calcium carbonate 600 mg calcium (1,500 mg) tablet Commonly known as:  Ale Locke Your last dose was: Your next dose is: Take 1 Tab by mouth daily.   
 600 mg  
    
 CARAFATE 1 gram tablet Generic drug:  sucralfate Your last dose was: Your next dose is: Take 1 g by mouth three (3) times daily. 1 g  
    
   
   
   
  
 cholecalciferol 1,000 unit tablet Commonly known as:  VITAMIN D3 Your last dose was: Your next dose is: Take 1 Tab by mouth daily. 1000 Units MELATONIN PO Your last dose was: Your next dose is: Take 5 mg by mouth nightly as needed. 5 mg STOOL SOFTENER PO Your last dose was: Your next dose is: Take  by mouth two (2) times a day. VITAMIN B-12 1,000 mcg tablet Generic drug:  cyanocobalamin Your last dose was: Your next dose is: Take 1,000 mcg by mouth daily. 1000 mcg Where to Get Your Medications Information on where to get these meds will be given to you by the nurse or doctor. ! Ask your nurse or doctor about these medications  
  ferrous sulfate 325 mg (65 mg iron) EC tablet Discharge Instructions Discharge Instructions PATIENT ID: Niki Layton MRN: [id number] YOB: 1955 DATE OF ADMISSION: 3/18/2018 10:59 AM   
DATE OF DISCHARGE: 3/20/2018 PRIMARY CARE PROVIDER: Soraya Lacey MD  
 
ATTENDING PHYSICIAN: Vish Garvin MD 
DISCHARGING PROVIDER: Vish Garvin MD   
To contact this individual call [phone] and ask the  to page. If unavailable ask to be transferred the Adult Hospitalist Department. DISCHARGE DIAGNOSES GI bleeding, diverticulosis, anemia CONSULTATIONS: IP CONSULT TO GENERAL SURGERY 
IP CONSULT TO HOSPITALIST 
IP CONSULT TO GASTROENTEROLOGY PROCEDURES/SURGERIES: Procedure(s): 
COLONOSCOPY 
ENDOSCOPIC POLYPECTOMY PENDING TEST RESULTS:  
 At the time of discharge the following test results are still pending: pathology of colon polyps FOLLOW UP APPOINTMENTS:  
Follow-up Information Follow up With Details Comments Contact Info Gene Ventura MD Schedule an appointment as soon as possible for a visit  [address] [facility] [address] [facility] 
[phone] Mi Baker MD Schedule an appointment as soon as possible for a visit  [address] [place] [address] [address] 
[phone] ADDITIONAL CARE RECOMMENDATIONS:  
You were admitted with gastrointestinal bleeding which has resolved. This was most likely due to diverticulosis. DIET: Regular Diet, increase fiber intake ACTIVITY: Activity as tolerated WOUND CARE: n/a 
 
EQUIPMENT needed: n/a DISCHARGE MEDICATIONS: 
 See Medication Reconciliation Form · It is important that you take the medication exactly as they are prescribed. · Keep your medication in the bottles provided by the pharmacist and keep a list of the medication names, dosages, and times to be taken in your wallet. · Do not take other medications without consulting your doctor. NOTIFY YOUR PHYSICIAN FOR ANY OF THE FOLLOWING:  
Fever over 101 degrees for 24 hours. Chest pain, shortness of breath, fever, chills, nausea, vomiting, diarrhea, change in mentation, falling, weakness, bleeding. Severe pain or pain not relieved by medications. Or, any other signs or symptoms that you may have questions about. DISPOSITION: 
x  Home With: 
 OT  PT  [place]  RN  
  
 SNF/Inpatient Rehab/LTAC Independent/assisted living Hospice Other: CDMP Checked:  
Yes x PROBLEM LIST Updated: 
Yes x Signed:  
Terri Neil MD 
3/20/2018 
4:40 PM 
 
 
  
  
  
[facility] Announcement We are excited to announce that we are making your provider's discharge notes available to you in [facility].   You will see these notes when they are completed and signed by the physician that discharged you from your recent hospital stay. If you have any questions or concerns about any information you see in [facility], please call the Health Information Department where you were seen or reach out to your Primary Care Provider for more information about your plan of care. Introducing [facility]! Dear Deon Calles: Thank you for requesting a [facility] account. Our records indicate that you already have an active [facility] account. You can access your account anytime at [email or web address] Did you know that you can access your hospital and ER discharge instructions at any time in [facility]? You can also review all of your test results from your hospital stay or ER visit. Additional Information If you have questions, please visit the Frequently Asked Questions section of the [facility] website at [email or web address] Remember, [facility] is NOT to be used for urgent needs. For medical emergencies, dial 911. Now available from your iPhone and Android! Providers Seen During Your Hospitalization Provider Specialty Primary office phone Rhina Harada, MD Emergency Medicine [phone] Yancy Prado MD Internal Medicine [phone] May Obrien MD Internal Medicine [phone] Xuan Collado MD Hospitalist [phone] Your Primary Care Physician (PCP) Primary Care Physician Office Phone Office Fax Jair No [phone] You are allergic to the following Allergen Reactions Codeine Hives Tolerates Dilaudid Recent Documentation Height Weight BMI OB Status Smoking Status 1.626 m 79.8 kg 30.21 kg/m2 Hysterectomy Current Every Day Smoker Emergency Contacts Name Discharge Info Relation Home Work Mobile [address] CAREGIVER [3] Son [22] [phone] [address] [facility] CAREGIVER [3] Sister [23] [phone] [address] Cecilia Hoang CAREGIVER [3] Other Relative [6] [phone] Celsa Desai  Child [2] [phone] Patient Belongings The following personal items are in your possession at time of discharge: 
  Dental Appliances: None  Visual Aid: Glasses, At bedside      Home Medications: None   Jewelry: None  Clothing: At bedside, Footwear, Pants, Shirt    Other Valuables: Cell Phone, Wallet, None, At bedside Please provide this summary of care documentation to your next provider. Signatures-by signing, you are acknowledging that this After Visit Summary has been reviewed with you and you have received a copy. Patient Signature:  ____________________________________________________________ Date:  ____________________________________________________________  
  
[facility] Signature:  ____________________________________________________________ Date:  ____________________________________________________________

## 2018-03-18 NOTE — CONSULTS
Surgery Consult    Subjective:      Siomara Justice is a 58 y.o. female who presents complaining of lower gi bleed. The patient began noticing large amount of bright red blood per rectum last nigth around midnight. She had 3 episodes over the night and decided to come in. She had an EGD about 2 weeks ago that did not show any ulceration. She initially thought that she might have hemorrhoids but this felt different. She has been having mild abdominal pain for the past few months but not any worse today. She is being evaluated for revisional  bariatric surgery . Patient Active Problem List    Diagnosis Date Noted    BRBPR (bright red blood per rectum) 03/18/2018    Gastroesophageal reflux disease without esophagitis 02/07/2018    B12 deficiency 11/21/2017    Broken toe     H/O hyperparathyroidism 06/20/2017    Hypercalcemia 02/21/2017    Active advance directive on file 04/18/2016    Onychomycosis 07/23/2014    Osteoporosis 05/13/2014    Iron deficiency anemia, unspecified 05/13/2014    Insomnia, unspecified 04/23/2014    Grief reaction 04/23/2014    Pernicious anemia 04/23/2014    Vitamin D deficiency 04/23/2014    Reflux esophagitis 04/23/2014    Gastric ulcer 04/23/2014    Symptomatic menopausal or female climacteric states 04/23/2014    Cellulitis of shoulder 04/08/2013    Post op infection 11/07/2012    Closed fracture of left proximal humerus 04/25/2012     Past Medical History:   Diagnosis Date    Arthritis     OSTEO    Broken toe 2017    right pinky toe    Chronic pain     LT. ARM    GERD (gastroesophageal reflux disease)     History of blood transfusion 1995    [place], NO REACTION; needed transfusion from surgery-ovarian cyst and hit artery per pt.     Infection 2012    left shoulder    Morbid obesity ([address] 3/23/2011    PUD (peptic ulcer disease)       Past Surgical History:   Procedure Laterality Date    BIOPSY/EXCISION, LYMPH NODE(S)      BREAST SURGERY PROCEDURE UNLISTED      LT. TUMOR BENIGN REMOVED AGE 25    ENDOCRINE SURGERY PROC UNLISTED      HX ABDOMINOPLASTY  2006    HX ADENOIDECTOMY      HX CARPAL TUNNEL RELEASE  2001    RIGHT    HX CHOLECYSTECTOMY  2000    HX COLONOSCOPY      2014, due 17 vs 19    HX GASTRIC BYPASS  12-13-00    dr. Amara Yao HX GI  2016    endoscopy, colonoscopy    HX HEENT  1995    TUMOR REMOVED neck (benign)    HX HYSTERECTOMY  1993    HX ORTHOPAEDIC      CYCST REMOVED BASE OF SPINE AGE 23    HX ORTHOPAEDIC  2004,2009    RIGHT ANKLE    HX ORTHOPAEDIC  4/2012    LT.  SHOULDER AND HUMERUS FX, SCREW AND PLATE ( has been removed)    HX ORTHOPAEDIC Right 2014    shoulder fx, orif    HX OTHER SURGICAL      TUMOR REMOVED RIGHT UPPER FLANK AREA    HX TONSIL AND ADENOIDECTOMY      AGE7    HX TONSILLECTOMY      HX TUBAL LIGATION  1981    LEG/ANKLE SURGERY PROC UNLISTED      right ankle cartilage    LEG/ANKLE SURGERY PROC UNLISTED  2009     ankle      Social History   Substance Use Topics    Smoking status: Current Every Day Smoker     Packs/day: 0.50     Years: 7.00     Types: Cigarettes    Smokeless tobacco: Never Used    Alcohol use 2.4 oz/week     1 Glasses of wine, 3 Cans of beer per week      Comment: 6 PACK/every 2 weeks      Family History   Problem Relation Age of Onset    Other Mother      hypoglycemia, obese,Iza corey syndrome    Cancer Mother      LUNG    Lung Disease Mother     Cancer Father      GENERALIZED    Other Father      [place] corey syndrome gene trait    Asthma Sister     Lung Disease Sister     Hypertension Brother     Lung Disease Brother     Diabetes Brother     Thyroid Disease Brother     Other Brother      [place] corey syndrome    Heart Disease Maternal Aunt     Cancer Maternal Uncle      UNKNOWN    Cancer Paternal Aunt      BREAST    Cancer Paternal Uncle     Heart Disease Maternal Grandmother     Heart Disease Maternal Grandfather     Heart Disease Paternal Grandmother     Heart Disease Paternal Grandfather     Cancer Paternal Uncle     Lung Disease Brother     Anesth Problems Neg Hx       Current Facility-Administered Medications   Medication Dose Route Frequency    sodium chloride (NS) flush        sodium chloride (NS) flush 5-10 mL  5-10 mL IntraVENous Q8H    sodium chloride (NS) flush 5-10 mL  5-10 mL IntraVENous PRN    0.9% sodium chloride infusion  75 mL/hr IntraVENous CONTINUOUS    pantoprazole (PROTONIX) 40 mg in sodium chloride (NS) 10 mL injection  40 mg IntraVENous Q12H     Current Outpatient Prescriptions   Medication Sig    sucralfate (CARAFATE) 1 gram tablet Take 1 g by mouth four (4) times daily.  DOCUSATE SODIUM (STOOL SOFTENER PO) Take  by mouth two (2) times a day.  esomeprazole (NEXIUM) 40 mg capsule TK ONE C PO  BID    cholecalciferol (VITAMIN D3) 1,000 unit tablet Take 1 Tab by mouth daily.  calcium carbonate (CALTREX) 600 mg calcium (1,500 mg) tablet Take 1 Tab by mouth daily.  psyllium (METAMUCIL) 0.52 gram capsule Take 1 Cap by mouth daily.  MELATONIN PO Take  by mouth nightly as needed.  cyanocobalamin (VITAMIN B-12) 1,000 mcg tablet Take 1,000 mcg by mouth daily.       Allergies   Allergen Reactions    Codeine Hives     Tolerates Dilaudid       Review of Systems:    Constitutional: positive for fatigue  Eyes: negative  Ears, Nose, Mouth, Throat, and Face: negative  Respiratory: negative  Cardiovascular: negative  Gastrointestinal: positive for rectal bleeding   Genitourinary:negative  Integument/Breast: negative  Hematologic/Lymphatic: negative  Musculoskeletal:negative  Neurological: negative  Behavioral/Psychiatric: negative  Endocrine: negative  Allergic/Immunologic: negative    Objective:        Visit Vitals    /65    Pulse 80    Temp 98.1 °F (36.7 °C)    Resp 17    Ht 5' 4\" (1.626 m)    Wt 176 lb (79.8 kg)    SpO2 97%    BMI 30.21 kg/m2       Physical Exam:  GENERAL: alert, cooperative, no distress, appears stated age, EYE: negative, THROAT & NECK: normal, LUNG: clear to auscultation bilaterally, HEART: regular rate and rhythm, ABDOMEN: soft, non-tender. Bowel sounds normal. No masses,  no organomegaly, EXTREMITIES:  extremities normal, atraumatic, no cyanosis or edema, SKIN: Normal., NEUROLOGIC: negative, PSYCH: non focal    Imaging:  images and reports reviewed  CT-   1. An active source of GI bleeding is not demonstrated  Lab/Data Review:    Recent Results (from the past 24 hour(s))   CBC WITH AUTOMATED DIFF    Collection Time: 03/18/18  8:49 AM   Result Value Ref Range    WBC 10.9 3.6 - 11.0 K/uL    RBC 3.89 3.80 - 5.20 M/uL    HGB 11.6 11.5 - 16.0 g/dL    HCT 35.5 35.0 - 47.0 %    MCV 91.3 80.0 - 99.0 FL    MCH 29.8 26.0 - 34.0 PG    MCHC 32.7 30.0 - 36.5 g/dL    RDW 13.6 11.5 - 14.5 %    PLATELET 294 204 - 519 K/uL    MPV 11.8 8.9 - 12.9 FL    NRBC 0.0 0  WBC    ABSOLUTE NRBC 0.00 0.00 - 0.01 K/uL    NEUTROPHILS 80 (H) 32 - 75 %    LYMPHOCYTES 14 12 - 49 %    MONOCYTES 4 (L) 5 - 13 %    EOSINOPHILS 1 0 - 7 %    BASOPHILS 0 0 - 1 %    IMMATURE GRANULOCYTES 0 0.0 - 0.5 %    ABS. NEUTROPHILS 8.8 (H) 1.8 - 8.0 K/UL    ABS. LYMPHOCYTES 1.5 0.8 - 3.5 K/UL    ABS. MONOCYTES 0.5 0.0 - 1.0 K/UL    ABS. EOSINOPHILS 0.1 0.0 - 0.4 K/UL    ABS. BASOPHILS 0.0 0.0 - 0.1 K/UL    ABS. IMM.  GRANS. 0.0 0.00 - 0.04 K/UL    DF AUTOMATED     METABOLIC PANEL, COMPREHENSIVE    Collection Time: 03/18/18  8:49 AM   Result Value Ref Range    Sodium 143 136 - 145 mmol/L    Potassium 4.0 3.5 - 5.1 mmol/L    Chloride 107 97 - 108 mmol/L    CO2 30 21 - 32 mmol/L    Anion gap 6 5 - 15 mmol/L    Glucose 93 65 - 100 mg/dL    BUN 18 6 - 20 MG/DL    Creatinine 0.49 (L) 0.55 - 1.02 MG/DL    BUN/Creatinine ratio 37 (H) 12 - 20      GFR est AA >60 >60 ml/min/1.73m2    GFR est non-AA >60 >60 ml/min/1.73m2    Calcium 8.8 8.5 - 10.1 MG/DL    Bilirubin, total 0.4 0.2 - 1.0 MG/DL    ALT (SGPT) 21 12 - 78 U/L    AST (SGOT) 11 (L) 15 - 37 U/L    Alk. phosphatase 47 45 - 117 U/L    Protein, total 6.9 6.4 - 8.2 g/dL    Albumin 3.7 3.5 - 5.0 g/dL    Globulin 3.2 2.0 - 4.0 g/dL    A-G Ratio 1.2 1.1 - 2.2     PROTHROMBIN TIME + INR    Collection Time: 03/18/18  8:49 AM   Result Value Ref Range    INR 1.0 0.9 - 1.1      Prothrombin time 10.3 9.0 - 11.1 sec   PTT    Collection Time: 03/18/18  8:49 AM   Result Value Ref Range    aPTT 22.9 22.1 - 32.0 sec    aPTT, therapeutic range     58.0 - 77.0 SECS   TYPE & SCREEN    Collection Time: 03/18/18  8:49 AM   Result Value Ref Range    Crossmatch Expiration 03/21/2018     ABO/Rh(D) A POSITIVE     Antibody screen NEG    URINALYSIS W/ RFLX MICROSCOPIC    Collection Time: 03/18/18 10:07 AM   Result Value Ref Range    Color YELLOW/STRAW      Appearance CLEAR CLEAR      Specific gravity 1.013 1.003 - 1.030      pH (UA) 6.0 5.0 - 8.0      Protein NEGATIVE  NEG mg/dL    Glucose NEGATIVE  NEG mg/dL    Ketone NEGATIVE  NEG mg/dL    Bilirubin NEGATIVE  NEG      Blood NEGATIVE  NEG      Urobilinogen 0.2 0.2 - 1.0 EU/dL    Nitrites NEGATIVE  NEG      Leukocyte Esterase TRACE (A) NEG      WBC 0-4 0 - 4 /hpf    RBC 0-5 0 - 5 /hpf    Epithelial cells FEW FEW /lpf    Bacteria NEGATIVE  NEG /hpf    Hyaline cast 0-2 0 - 5 /lpf       Assessment:Plan   GI bleed- I do not believe that this is related to her Gastric Bypass. Suspect colonic source. CTA is negative for active bleed. Per patient , she is scheduled for colonoscopy tomorrow. Will follow.      Signed By: Duane Chess, MD     March 18, 2018

## 2018-03-18 NOTE — ED NOTES
Bedside report given to Kang Chao RN who will assume care at this time. Report included content from [facility], [facility], Recent Results and ED Summary. RN aware of physical assessment and resulted/pending lab/radiology studies. A period of questions and answers was afforded.

## 2018-03-18 NOTE — CONSULTS
[address]    Aries Lui  MR#: [id number]  : 1955  ACCOUNT #: [de-identified]   DATE OF SERVICE: 2018    CHIEF COMPLAINT:  Rectal bleeding. HISTORY OF PRESENT ILLNESS:  A 70-year-old woman who is status post gastric bypass many years ago and is under evaluation by Dr. Concetta Mcwilliams for revision of the same. Recent EGD demonstrated no significant abnormalities in the gastric pouch or esophagus. The patient has had ongoing refractory GERD symptoms. The reason for admission is the acute development of bright red blood per rectum. She describes painless blood per rectum x3 without significant wooziness, some mild lower abdominal urgency but no severe pain. She has never experienced anything like this before. Her last colonoscopy was probably two years ago, at which time she was found to have only mild diverticulosis. She has had a history of a hemorrhoid in the past.  She denies any anal discomfort. Her brother has a history of Iza-Morales syndrome and is in the course of undergoing genetic evaluation. PAST MEDICAL HISTORY:  Positive for osteoarthritis, chronic left arm pain, refractory GERD, morbid obesity, peptic ulcer disease in the past.    PAST SURGICAL HISTORY:  Positive for breast surgery, abdominoplasty, adenoidectomy, parathyroidectomy, carpal tunnel release, cholecystectomy, gastric bypass, hysterectomy, tonsillectomy. FAMILY HISTORY:  Positive for colon cancer in a brother and [place]-Morales syndrome, asthma, lung disease, diabetes, thyroid disease. SOCIAL HISTORY:  She smokes and drinks a 6-pack every 2 weeks. ALLERGIES:  CODEINE. REVIEW OF SYSTEMS:  As noted above as well as some fatigue and lightheadedness. PHYSICAL EXAMINATION:  VITAL SIGNS:  Temperature is 98.1. Pulse is 80. Blood pressure is 148/65, respirations 17. GENERAL:  Overweight middle-aged woman in no acute distress, oriented x3. SKIN:  Warm and dry.   No petechiae or ecchymoses. HEENT:  Sclerae are anicteric, conjunctiva pink, moist mucous membranes. NECK:  Supple. LYMPHATIC:  No adenopathy in the neck or groin. CHEST:  Clear to auscultation and percussion. HEART:  Regular rate and rhythm. ABDOMEN:  Soft. There is a midline surgical scar. There is no hepatosplenomegaly. There is some minimal lower abdominal tenderness. EXTREMITIES:  Without cyanosis, clubbing or edema. LABORATORY DATA:  WBC is 10.9. Hemoglobin is 11.6. Platelet count is 774,362. There is a slight shift with differential to the left. PT/INR is normal.  CMP is normal.  Urgent CT scan for bleeding was negative. IMPRESSION:    1. Bright red rectal bleeding. I suspect this is diverticular. 2.  Refractory gastroesophageal reflux disease. PLAN:  Clear liquids and prep for colonoscopy tomorrow to assure no other lesions. If the patient has recurrent bleed, could consider a bleeding scan to try to identify a definite site. Colonoscopy is planned for tomorrow. MD Kurt Lagunas / [place]  D: 03/18/2018 12:51     T: 03/18/2018 14:04  JOB #: [id number]  CC: Keith Mena MD

## 2018-03-18 NOTE — ED PROVIDER NOTES
HPI Comments: 58 y.o. female with past medical history significant for Obesity, GERD, Arthritis who presents from Home with chief complaint of Hematochezia. Patient reports onset this morning at  1200 of hematochezia. Patient reports reoccurring episode at 0300 and at 0600. Pt notes that her last bowel movement was described as \"diarrhea with blood\". Pt reports accompanying rectal pain described as \"pressure\", increased fatigue, lightheadedness, and mild epigastric pain. Patient has a previous history of regurgitation following open gastric bypass followed by Dr. Myriam Forrest. Patient completed a UGI which reveals a large pouch with dilated gastrojejunostomy and spontaneous reflux in supine position; Endoscopy with irregular Z-line which is associated with acid reflux. Patient was last seen by Dr. Myriam Forrest on 3/13/18 during which time Dr. Myriam Forrest recommended revision surgery. Pt has a previous history of Diverticulosis. Pt denies fever, chills, cough, congestion, shortness of breath, chest pain, nausea, vomiting, difficulty with urination or dysuria. There are no other acute medical concerns at this time. PCP: Ameya Moran MD    Note written by Robel Salmon, as dictated by Manav Carpio MD 8:56 AM    The history is provided by the patient. Past Medical History:   Diagnosis Date    Arthritis     OSTEO    Broken toe 2017    right pinky toe    Chronic pain     LT. ARM    GERD (gastroesophageal reflux disease)     History of blood transfusion 1995    [place], NO REACTION; needed transfusion from surgery-ovarian cyst and hit artery per pt.     Infection 2012    left shoulder    Morbid obesity ([address] 3/23/2011    PUD (peptic ulcer disease)        Past Surgical History:   Procedure Laterality Date    BIOPSY/EXCISION, LYMPH NODE(S)      BREAST SURGERY PROCEDURE UNLISTED      LT. TUMOR BENIGN REMOVED AGE 18    ENDOCRINE SURGERY PROC UNLISTED      HX ABDOMINOPLASTY  2006    HX ADENOIDECTOMY      HX CARPAL TUNNEL RELEASE  2001    RIGHT    HX CHOLECYSTECTOMY  2000    HX COLONOSCOPY      2014, due 17 vs 19    HX GASTRIC BYPASS  12-13-00    dr. Vinh Diaz HX GI  2016    endoscopy, colonoscopy    HX HEENT  1995    TUMOR REMOVED neck (benign)    HX HYSTERECTOMY  1993    HX ORTHOPAEDIC      CYCST REMOVED BASE OF SPINE AGE 23    HX ORTHOPAEDIC  2004,2009    RIGHT ANKLE    HX ORTHOPAEDIC  4/2012    LT. SHOULDER AND HUMERUS FX, SCREW AND PLATE ( has been removed)    HX ORTHOPAEDIC Right 2014    shoulder fx, orif    HX OTHER SURGICAL      TUMOR REMOVED RIGHT UPPER FLANK AREA    HX TONSIL AND ADENOIDECTOMY      AGE7    HX TONSILLECTOMY      HX TUBAL LIGATION  1981    LEG/ANKLE SURGERY [address] UNLISTED      right ankle cartilage    LEG/ANKLE SURGERY PROC UNLISTED  2009     ankle         Family History:   Problem Relation Age of Onset    Other Mother      hypoglycemia, obese,Iza corey syndrome    Cancer Mother      LUNG    Lung Disease Mother     Cancer Father      GENERALIZED    Other Father      [place] corey syndrome gene trait    Asthma Sister     Lung Disease Sister     Hypertension Brother     Lung Disease Brother     Diabetes Brother     Thyroid Disease Brother     Other Brother      [place] corey syndrome    Heart Disease Maternal Aunt     Cancer Maternal Uncle      UNKNOWN    Cancer Paternal Aunt      BREAST    Cancer Paternal Uncle     Heart Disease Maternal Grandmother     Heart Disease Maternal Grandfather     Heart Disease Paternal Grandmother     Heart Disease Paternal Grandfather     Cancer Paternal Uncle     Lung Disease Brother     Anesth Problems Neg Hx        Social History     Social History    Marital status:      Spouse name: N/A    Number of children: N/A    Years of education: N/A     Occupational History    Not on file.      Social History Main Topics    Smoking status: Current Every Day Smoker Packs/day: 0.50     Years: 7.00     Types: Cigarettes    Smokeless tobacco: Never Used    Alcohol use 2.4 oz/week     1 Glasses of wine, 3 Cans of beer per week      Comment: 6 PACK/every 2 weeks    Drug use: No    Sexual activity: Not on file     Other Topics Concern    Not on file     Social History Narrative       ALLERGIES: Codeine    Review of Systems   Constitutional: Positive for fatigue. Negative for chills and fever. HENT: Negative for congestion. Respiratory: Negative for cough and shortness of breath. Cardiovascular: Negative for chest pain. Gastrointestinal: Positive for blood in stool and diarrhea. Negative for abdominal pain, nausea and vomiting. Genitourinary: Negative for difficulty urinating and dysuria. Neurological: Positive for light-headedness. All other systems reviewed and are negative. Vitals:    03/18/18 1125 03/18/18 1200 03/18/18 1300 03/18/18 1400   BP: 160/85 148/65 143/73 137/66   Pulse: 80      Resp:       Temp:       SpO2: 100% 97% 97% 94%   Weight:       Height:                Physical Exam     Nursing note and vitals reviewed. Constitutional: appears well-developed and well-nourished. No distress. HENT:   Head: Normocephalic and atraumatic. Sclera anicteric  Nose: No rhinorrhea  Mouth/Throat: Oropharynx is clear and moist. Pharynx normal  Eyes: Conjunctivae are normal. Pupils are equal, round, and reactive to light. Right eye exhibits no discharge. Left eye exhibits no discharge. No scleral icterus. Neck: Painless normal range of motion. Supple  Cardiovascular: Normal rate, regular rhythm, normal heart sounds and intact distal pulses. Exam reveals no gallop and no friction rub. No murmur heard. Pulmonary/Chest: Effort normal and breath sounds normal. No respiratory distress. no wheezes. no rales. Abdominal: Soft. Bowel sounds are normal. Exhibits no distension and no mass. MINIMAL EPIGASTRIC TENDERNESS (OLD PER PT). No guarding.    Musculoskeletal: Normal range of motion. no tenderness. No edema  Lymphadenopathy:   No cervical adenopathy. Neurological:  Alert and oriented to person, place, and time. Coordination normal. CN 2-12 intact. Moving all extremities. Skin: Skin is warm and dry. No rash noted. No pallor. MDM  BRBPR. H/O DIVERITICULOSIS AND GASTRIC BYPASS. AFEBRILE.  3 EPISODES OF BLOODY STOOL. DDX:  ANEMIA, GI BLEED, COAGULOPATHY AND OTHERS. CHECK LABS, TYPE AND SCREEN, CONSULT SURGERY. ED Course       Procedures    10:13 AM  D/W DR. Marilu Gitelman, GENERAL SURGERY. REVIEWED HX, EXAM AND RESULTS. AGREES WITH HOSPITALIST ADMISSION AND GI CONSULT. CONSULT NOTE:  10:38 AM Ameya Thomson MD spoke with Dr. Radha Kaur for Gastroenterology. Discussed available diagnostic tests and clinical findings. Dr. Teo Weaver recommends CTA    CONSULT NOTE:  10:57 Gaurav Salas MD spoke with Dr. Shereen Kaur for Hospitalist.  Discussed available diagnostic tests and clinical findings. Will see and admit patient    Recent Results (from the past 24 hour(s))   CBC WITH AUTOMATED DIFF    Collection Time: 03/18/18  8:49 AM   Result Value Ref Range    WBC 10.9 3.6 - 11.0 K/uL    RBC 3.89 3.80 - 5.20 M/uL    HGB 11.6 11.5 - 16.0 g/dL    HCT 35.5 35.0 - 47.0 %    MCV 91.3 80.0 - 99.0 FL    MCH 29.8 26.0 - 34.0 PG    MCHC 32.7 30.0 - 36.5 g/dL    RDW 13.6 11.5 - 14.5 %    PLATELET 739 887 - 763 K/uL    MPV 11.8 8.9 - 12.9 FL    NRBC 0.0 0  WBC    ABSOLUTE NRBC 0.00 0.00 - 0.01 K/uL    NEUTROPHILS 80 (H) 32 - 75 %    LYMPHOCYTES 14 12 - 49 %    MONOCYTES 4 (L) 5 - 13 %    EOSINOPHILS 1 0 - 7 %    BASOPHILS 0 0 - 1 %    IMMATURE GRANULOCYTES 0 0.0 - 0.5 %    ABS. NEUTROPHILS 8.8 (H) 1.8 - 8.0 K/UL    ABS. LYMPHOCYTES 1.5 0.8 - 3.5 K/UL    ABS. MONOCYTES 0.5 0.0 - 1.0 K/UL    ABS. EOSINOPHILS 0.1 0.0 - 0.4 K/UL    ABS. BASOPHILS 0.0 0.0 - 0.1 K/UL    ABS. IMM.  GRANS. 0.0 0.00 - 0.04 K/UL    DF AUTOMATED     METABOLIC PANEL, COMPREHENSIVE    Collection Time: 03/18/18  8:49 AM   Result Value Ref Range    Sodium 143 136 - 145 mmol/L    Potassium 4.0 3.5 - 5.1 mmol/L    Chloride 107 97 - 108 mmol/L    CO2 30 21 - 32 mmol/L    Anion gap 6 5 - 15 mmol/L    Glucose 93 65 - 100 mg/dL    BUN 18 6 - 20 MG/DL    Creatinine 0.49 (L) 0.55 - 1.02 MG/DL    BUN/Creatinine ratio 37 (H) 12 - 20      GFR est AA >60 >60 ml/min/1.73m2    GFR est non-AA >60 >60 ml/min/1.73m2    Calcium 8.8 8.5 - 10.1 MG/DL    Bilirubin, total 0.4 0.2 - 1.0 MG/DL    ALT (SGPT) 21 12 - 78 U/L    AST (SGOT) 11 (L) 15 - 37 U/L    Alk.  phosphatase 47 45 - 117 U/L    Protein, total 6.9 6.4 - 8.2 g/dL    Albumin 3.7 3.5 - 5.0 g/dL    Globulin 3.2 2.0 - 4.0 g/dL    A-G Ratio 1.2 1.1 - 2.2     PROTHROMBIN TIME + INR    Collection Time: 03/18/18  8:49 AM   Result Value Ref Range    INR 1.0 0.9 - 1.1      Prothrombin time 10.3 9.0 - 11.1 sec   PTT    Collection Time: 03/18/18  8:49 AM   Result Value Ref Range    aPTT 22.9 22.1 - 32.0 sec    aPTT, therapeutic range     58.0 - 77.0 SECS   TYPE & SCREEN    Collection Time: 03/18/18  8:49 AM   Result Value Ref Range    Crossmatch Expiration 03/21/2018     ABO/Rh(D) A POSITIVE     Antibody screen NEG    URINALYSIS W/ RFLX MICROSCOPIC    Collection Time: 03/18/18 10:07 AM   Result Value Ref Range    Color YELLOW/STRAW      Appearance CLEAR CLEAR      Specific gravity 1.013 1.003 - 1.030      pH (UA) 6.0 5.0 - 8.0      Protein NEGATIVE  NEG mg/dL    Glucose NEGATIVE  NEG mg/dL    Ketone NEGATIVE  NEG mg/dL    Bilirubin NEGATIVE  NEG      Blood NEGATIVE  NEG      Urobilinogen 0.2 0.2 - 1.0 EU/dL    Nitrites NEGATIVE  NEG      Leukocyte Esterase TRACE (A) NEG      WBC 0-4 0 - 4 /hpf    RBC 0-5 0 - 5 /hpf    Epithelial cells FEW FEW /lpf    Bacteria NEGATIVE  NEG /hpf    Hyaline cast 0-2 0 - 5 /lpf       Ct Abd Pelv W Wo Cont    Result Date: 3/18/2018  INDICATION: Acute GI bleed COMPARISON: December 20, 2017 TECHNIQUE: Following the uneventful intravenous administration of 100 cc Isovue-370, thin axial images were obtained through the abdomen and pelvis. Coronal and sagittal reconstructions were generated. Oral contrast was not administered. CT dose reduction was achieved through use of a standardized protocol tailored for this examination and automatic exposure control for dose modulation. Unenhanced, arterial and delayed phase images were obtained FINDINGS: LUNG BASES: Trace left pleural effusion INCIDENTALLY IMAGED HEART AND MEDIASTINUM: Unremarkable. LIVER: No mass or biliary dilatation. GALLBLADDER: Unremarkable. SPLEEN: No mass. PANCREAS: No mass or ductal dilatation. ADRENALS: Unremarkable. KIDNEYS: No mass, calculus, or hydronephrosis. STOMACH: Post gastric bypass SMALL BOWEL: No dilatation or wall thickening. COLON: No dilatation or wall thickening. APPENDIX: Unremarkable. PERITONEUM: No ascites or pneumoperitoneum. RETROPERITONEUM: No lymphadenopathy or aortic aneurysm. There are vascular calcifications REPRODUCTIVE ORGANS: Surgically absent URINARY BLADDER: No mass or calculus. BONES: No destructive bone lesion. ADDITIONAL COMMENTS: N/A     IMPRESSION: 1.  An active source of GI bleeding is not demonstrated

## 2018-03-19 LAB
ANION GAP SERPL CALC-SCNC: 4 MMOL/L (ref 5–15)
ANION GAP SERPL CALC-SCNC: 7 MMOL/L (ref 5–15)
BASOPHILS # BLD: 0 K/UL (ref 0–0.1)
BASOPHILS NFR BLD: 1 % (ref 0–1)
BUN SERPL-MCNC: 10 MG/DL (ref 6–20)
BUN SERPL-MCNC: 11 MG/DL (ref 6–20)
BUN/CREAT SERPL: 24 (ref 12–20)
BUN/CREAT SERPL: 29 (ref 12–20)
CALCIUM SERPL-MCNC: 7.6 MG/DL (ref 8.5–10.1)
CALCIUM SERPL-MCNC: 7.8 MG/DL (ref 8.5–10.1)
CHLORIDE SERPL-SCNC: 111 MMOL/L (ref 97–108)
CHLORIDE SERPL-SCNC: 112 MMOL/L (ref 97–108)
CO2 SERPL-SCNC: 27 MMOL/L (ref 21–32)
CO2 SERPL-SCNC: 29 MMOL/L (ref 21–32)
CREAT SERPL-MCNC: 0.38 MG/DL (ref 0.55–1.02)
CREAT SERPL-MCNC: 0.41 MG/DL (ref 0.55–1.02)
DIFFERENTIAL METHOD BLD: ABNORMAL
EOSINOPHIL # BLD: 0.1 K/UL (ref 0–0.4)
EOSINOPHIL NFR BLD: 1 % (ref 0–7)
ERYTHROCYTE [DISTWIDTH] IN BLOOD BY AUTOMATED COUNT: 13.9 % (ref 11.5–14.5)
GLUCOSE SERPL-MCNC: 84 MG/DL (ref 65–100)
GLUCOSE SERPL-MCNC: 86 MG/DL (ref 65–100)
HCT VFR BLD AUTO: 27.8 % (ref 35–47)
HCT VFR BLD AUTO: 28.1 % (ref 35–47)
HGB BLD-MCNC: 9.2 G/DL (ref 11.5–16)
HGB BLD-MCNC: 9.3 G/DL (ref 11.5–16)
IMM GRANULOCYTES # BLD: 0 K/UL (ref 0–0.04)
IMM GRANULOCYTES NFR BLD AUTO: 1 % (ref 0–0.5)
LYMPHOCYTES # BLD: 2 K/UL (ref 0.8–3.5)
LYMPHOCYTES NFR BLD: 31 % (ref 12–49)
MCH RBC QN AUTO: 30.3 PG (ref 26–34)
MCHC RBC AUTO-ENTMCNC: 33.1 G/DL (ref 30–36.5)
MCV RBC AUTO: 91.5 FL (ref 80–99)
MONOCYTES # BLD: 0.4 K/UL (ref 0–1)
MONOCYTES NFR BLD: 6 % (ref 5–13)
NEUTS SEG # BLD: 3.9 K/UL (ref 1.8–8)
NEUTS SEG NFR BLD: 61 % (ref 32–75)
NRBC # BLD: 0 K/UL (ref 0–0.01)
NRBC BLD-RTO: 0 PER 100 WBC
PLATELET # BLD AUTO: 171 K/UL (ref 150–400)
PMV BLD AUTO: 11.7 FL (ref 8.9–12.9)
POTASSIUM SERPL-SCNC: 3.5 MMOL/L (ref 3.5–5.1)
POTASSIUM SERPL-SCNC: 3.7 MMOL/L (ref 3.5–5.1)
RBC # BLD AUTO: 3.07 M/UL (ref 3.8–5.2)
SODIUM SERPL-SCNC: 144 MMOL/L (ref 136–145)
SODIUM SERPL-SCNC: 146 MMOL/L (ref 136–145)
TSH SERPL DL<=0.05 MIU/L-ACNC: 4.32 UIU/ML (ref 0.36–3.74)
WBC # BLD AUTO: 6.4 K/UL (ref 3.6–11)

## 2018-03-19 PROCEDURE — 74011000250 HC RX REV CODE- 250: Performed by: SPECIALIST

## 2018-03-19 PROCEDURE — 99218 HC RM OBSERVATION: CPT

## 2018-03-19 PROCEDURE — 84443 ASSAY THYROID STIM HORMONE: CPT | Performed by: INTERNAL MEDICINE

## 2018-03-19 PROCEDURE — 74011250636 HC RX REV CODE- 250/636: Performed by: INTERNAL MEDICINE

## 2018-03-19 PROCEDURE — C9113 INJ PANTOPRAZOLE SODIUM, VIA: HCPCS | Performed by: INTERNAL MEDICINE

## 2018-03-19 PROCEDURE — 85018 HEMOGLOBIN: CPT | Performed by: INTERNAL MEDICINE

## 2018-03-19 PROCEDURE — 74011000258 HC RX REV CODE- 258: Performed by: INTERNAL MEDICINE

## 2018-03-19 PROCEDURE — 36415 COLL VENOUS BLD VENIPUNCTURE: CPT | Performed by: INTERNAL MEDICINE

## 2018-03-19 PROCEDURE — 96376 TX/PRO/DX INJ SAME DRUG ADON: CPT

## 2018-03-19 PROCEDURE — 85025 COMPLETE CBC W/AUTO DIFF WBC: CPT | Performed by: INTERNAL MEDICINE

## 2018-03-19 PROCEDURE — 80048 BASIC METABOLIC PNL TOTAL CA: CPT | Performed by: INTERNAL MEDICINE

## 2018-03-19 RX ORDER — SODIUM CHLORIDE 450 MG/100ML
75 INJECTION, SOLUTION INTRAVENOUS CONTINUOUS
Status: DISCONTINUED | OUTPATIENT
Start: 2018-03-19 | End: 2018-03-20 | Stop reason: HOSPADM

## 2018-03-19 RX ORDER — POLYETHYLENE GLYCOL 3350, SODIUM SULFATE ANHYDROUS, SODIUM BICARBONATE, SODIUM CHLORIDE, POTASSIUM CHLORIDE 236; 22.74; 6.74; 5.86; 2.97 G/4L; G/4L; G/4L; G/4L; G/4L
4000 POWDER, FOR SOLUTION ORAL ONCE
Status: DISPENSED | OUTPATIENT
Start: 2018-03-19 | End: 2018-03-20

## 2018-03-19 RX ADMIN — POLYETHYLENE GLYCOL-3350 AND ELECTROLYTES 4000 ML: 236; 6.74; 5.86; 2.97; 22.74 POWDER, FOR SOLUTION ORAL at 16:00

## 2018-03-19 RX ADMIN — Medication 10 ML: at 21:58

## 2018-03-19 RX ADMIN — SODIUM CHLORIDE 75 ML/HR: 450 INJECTION, SOLUTION INTRAVENOUS at 14:29

## 2018-03-19 RX ADMIN — PANTOPRAZOLE SODIUM 40 MG: 40 INJECTION, POWDER, FOR SOLUTION INTRAVENOUS at 08:17

## 2018-03-19 RX ADMIN — PANTOPRAZOLE SODIUM 40 MG: 40 INJECTION, POWDER, FOR SOLUTION INTRAVENOUS at 21:58

## 2018-03-19 NOTE — PROGRESS NOTES
GI PROGRESS NOTE    NAME:             David Feliz   :              1955   MRN:              [id number]   Admit Date:     3/18/2018  Todays Date:  3/19/2018      Subjective:          Painless rectal bleeding c/o fatigue, Hgb falling    Review of Systems - Respiratory ROS: no cough, shortness of breath, or wheezing  Cardiovascular ROS: no chest pain or dyspnea on exertion  Medications-reviewed     Current Facility-Administered Medications   Medication Dose Route Frequency    PEG 3350-Electrolytes (GO-LYTELY) SUSPENSION 4,000 mL  4,000 mL Oral ONCE    0.45% sodium chloride infusion  75 mL/hr IntraVENous CONTINUOUS    sodium chloride (NS) flush 5-10 mL  5-10 mL IntraVENous Q8H    sodium chloride (NS) flush 5-10 mL  5-10 mL IntraVENous PRN    pantoprazole (PROTONIX) 40 mg in sodium chloride (NS) 10 mL injection  40 mg IntraVENous Q12H    ondansetron (ZOFRAN) injection 4 mg  4 mg IntraVENous Q8H PRN        Objective:   Patient Vitals for the past 8 hrs:   BP Temp Pulse Resp SpO2   18 1520 117/63 98.5 °F (36.9 °C) 74 20 96 %   18 1252 127/79 98.2 °F (36.8 °C) 75 20 98 %             EXAM:    Visit Vitals    /63 (BP 1 Location: Left arm, BP Patient Position: At rest)    Pulse 74    Temp 98.5 °F (36.9 °C)    Resp 20    Ht 5' 4\" (1.626 m)    Wt 79.8 kg (176 lb)    SpO2 96%    BMI 30.21 kg/m2     GENERAL:  Overweight middle-aged woman in no acute distress, oriented x3. SKIN:  Warm and dry. No petechiae or ecchymoses. HEENT:  Sclerae are anicteric, conjunctiva pink, moist mucous membranes. NECK:  Supple. LYMPHATIC:  No adenopathy in the neck or groin. CHEST:  Clear to auscultation and percussion. HEART:  Regular rate and rhythm. ABDOMEN:  Soft. There is a midline surgical scar. There is no hepatosplenomegaly. There is some minimal lower abdominal tenderness. EXTREMITIES:  Without cyanosis, clubbing or edema.     Data Review     Recent Labs      18   1612 03/19/18   0529   03/18/18   0849   WBC   --   6.4   --   10.9   HGB  9.2*  9.3*   < >  11.6   HCT  27.8*  28.1*   < >  35.5   PLT   --   171   --   231    < > = values in this interval not displayed. Recent Labs      03/19/18   0529  03/18/18   0849   NA  146*  144  143   K  3.7  3.5  4.0   CL  112*  111*  107   CO2  27  29  30   BUN  11  10  18   CREA  0.38*  0.41*  0.49*   GLU  84  86  93   CA  7.8*  7.6*  8.8     Recent Labs      03/18/18   0849   SGOT  11*   AP  47   TP  6.9   ALB  3.7   GLOB  3.2                              Assessment:   1. Bright red rectal bleeding. I suspect this is diverticular. 2.  Brother with Iza-Morales Syndrome  3. Refractory gastroesophageal reflux disease. 4.  S/P gastric bypass         Principal Problem:    BRBPR (bright red blood per rectum) (3/18/2018)        Plan:   1.  Colonoscopy tomorrow         Ta Gramajo MD

## 2018-03-19 NOTE — PROGRESS NOTES
Primary Nurse Sebastian Gillespie RN and NIKKY Aguilar performed a dual skin assessment on this patient No impairment noted  Angel score is 22    Bedside and Verbal shift change report given to Patrick Valdes (oncoming nurse) by Sam Parekh (offgoing nurse). Report included the following information SBAR, Kardex, Procedure Summary, Intake/Output, MAR, Accordion and Recent Results.

## 2018-03-19 NOTE — PROGRESS NOTES
Hospitalist Progress Note  Maria Antonia Eric MD  Answering service: [phone] from in house phone        Date of Service:  3/19/2018  NAME:  Traci Holden  :  1955  MRN:  [id number]      Admission Summary:     Traci Holden is a 58 y.o. female with medical history of Obesity s/p bypass surgery (17 years back), GERD (on sucralfate), and obese started to having bloody BM since mid night for a total of three times till the time of admission. The bleeding is bright red in nature and covering his toilet seat. Interval history / Subjective:       Still bleeding and had clots as well . Had prep , no improved . Assessment & Plan:       BRBPR   CT abd : An active source of GI bleeding is not demonstrated  Could be diverticular   Had colonoscopy in past , brother just got diagnosed with colon cancer   For colonoscopy in am   GI on board     GERD     History of thyroid surgery     Code status: full   DVT prophylaxis:     Care Plan discussed with: Patient/Family  Disposition: Home w/Family and TBD     Hospital Problems  Date Reviewed: 2018          Codes Class Noted POA    * (Principal)BRBPR (bright red blood per rectum) ICD-10-CM: K62.5  ICD-9-CM: 569.3  3/18/2018 Unknown                Review of Systems:   A comprehensive review of systems was negative except for that written in the HPI. Vital Signs:    Last 24hrs VS reviewed since prior progress note. Most recent are:  Visit Vitals    /63 (BP 1 Location: Left arm, BP Patient Position: At rest)    Pulse 75    Temp 98.6 °F (37 °C)    Resp 20    Ht 5' 4\" (1.626 m)    Wt 79.8 kg (176 lb)    SpO2 96%    BMI 30.21 kg/m2       No intake or output data in the 24 hours ending 18 1210     Physical Examination:             Constitutional:  No acute distress, cooperative, pleasant    ENT:  Oral mucous moist, oropharynx benign.  Neck supple,    Resp:  CTA bilaterally. No wheezing/rhonchi/rales. No accessory muscle use   CV:  Regular rhythm, normal rate, no murmurs, gallops, rubs    GI:  Soft, non distended, non tender. normoactive bowel sounds, no hepatosplenomegaly     Musculoskeletal:  No edema, warm, 2+ pulses throughout    Neurologic:  Moves all extremities. AAOx3, CN II-XII reviewed            Data Review:    Review and/or order of clinical lab test      Labs:     Recent Labs      03/19/18   0529  03/18/18   1704  03/18/18   0849   WBC  6.4   --   10.9   HGB  9.3*  10.3*  11.6   HCT  28.1*  31.0*  35.5   PLT  171   --   231     Recent Labs      03/19/18   0529  03/18/18   0849   NA  146*  144  143   K  3.7  3.5  4.0   CL  112*  111*  107   CO2  27  29  30   BUN  11  10  18   CREA  0.38*  0.41*  0.49*   GLU  84  86  93   CA  7.8*  7.6*  8.8     Recent Labs      03/18/18   0849   SGOT  11*   ALT  21   AP  47   TBILI  0.4   TP  6.9   ALB  3.7   GLOB  3.2     Recent Labs      03/18/18   0849   INR  1.0   PTP  10.3   APTT  22.9      No results for input(s): FE, TIBC, PSAT, FERR in the last 72 hours. Lab Results   Component Value Date/Time    Folate 13.9 04/15/2011 10:35 AM      No results for input(s): PH, PCO2, PO2 in the last 72 hours. No results for input(s): CPK, CKNDX, TROIQ in the last 72 hours.     No lab exists for component: CPKMB  Lab Results   Component Value Date/Time    Cholesterol, total 175 12/04/2017 12:00 AM    HDL Cholesterol 75 12/04/2017 12:00 AM    LDL, calculated 82 12/04/2017 12:00 AM    Triglyceride 91 12/04/2017 12:00 AM     No results found for: [facility]  Lab Results   Component Value Date/Time    Color YELLOW/STRAW 03/18/2018 10:07 AM    Appearance CLEAR 03/18/2018 10:07 AM    Specific gravity 1.013 03/18/2018 10:07 AM    pH (UA) 6.0 03/18/2018 10:07 AM    Protein NEGATIVE  03/18/2018 10:07 AM    Glucose NEGATIVE  03/18/2018 10:07 AM    Ketone NEGATIVE  03/18/2018 10:07 AM    Bilirubin NEGATIVE  03/18/2018 10:07 AM    Urobilinogen 0.2 03/18/2018 10:07 AM    Nitrites NEGATIVE  03/18/2018 10:07 AM    Leukocyte Esterase TRACE (A) 03/18/2018 10:07 AM    Epithelial cells FEW 03/18/2018 10:07 AM    Bacteria NEGATIVE  03/18/2018 10:07 AM    WBC 0-4 03/18/2018 10:07 AM    RBC 0-5 03/18/2018 10:07 AM         Medications Reviewed:     Current Facility-Administered Medications   Medication Dose Route Frequency    peg 3350-electrolytes (COLYTE) 4000 mL  4,000 mL Oral ONCE    sodium chloride (NS) flush 5-10 mL  5-10 mL IntraVENous Q8H    sodium chloride (NS) flush 5-10 mL  5-10 mL IntraVENous PRN    0.9% sodium chloride infusion  75 mL/hr IntraVENous CONTINUOUS    pantoprazole (PROTONIX) 40 mg in sodium chloride (NS) 10 mL injection  40 mg IntraVENous Q12H    ondansetron (ZOFRAN) injection 4 mg  4 mg IntraVENous Q8H PRN     ______________________________________________________________________  EXPECTED LENGTH OF STAY: - - -  ACTUAL LENGTH OF STAY:          Wendie Altman MD

## 2018-03-19 NOTE — PROGRESS NOTES
Bedside shift change report given to Karuna (oncoming nurse) by Bentley Mack (offgoing nurse). Report included the following information SBAR, Kardex, ED Summary, Intake/Output, MAR, Accordion, Recent Results and Med Rec Status.

## 2018-03-19 NOTE — PROGRESS NOTES
Patient is a 59 y/o   female insured by [employer], at [facility] for c/o BRBPR. Patient scheduled for colonoscopy tomorrow (Tuesday 3/20). CM s/w patient at bedside. Patient A&Ox4, confirmed demographics on facesheet. Patient lives with sister Ira Kyle and Lisset's  in [address] [place] [address], independent with ADL/IADL's, no home DME, drives, works full time. Patient independent with self care and ambulatory at [facility], denies perceived need for additional home services/supports/resources, denied further questions/concerns for this writer. Family will transport home when medically clear. No need for further CM interventions indicated at this time. Please consult CM should new discharge needs arise. Care Management Interventions  PCP Verified by CM:  Yes  Last Visit to PCP: 11/21/17  Gwen Signup: No  Discharge Durable Medical Equipment: No  Physical Therapy Consult: No  Occupational Therapy Consult: No  Speech Therapy Consult: No  Current Support Network: Relative's Home  Confirm Follow Up Transport: Family  Plan discussed with Pt/Family/Caregiver: Yes  Discharge Location  Discharge Placement: [address] [place] [address]

## 2018-03-19 NOTE — ED NOTES
2000:  Report received from [facility] and Fransisco Lara RN. Patient is in bed, A&O X3, skin is warm and dry, respirations are even and unlabored. Call bell within reach, will continue to monitor. 2046:  Patient up to restroom without assist, voided x1.    2223: Chart updated, report given to Serina Nieves RN. Patient is ready for transport.

## 2018-03-19 NOTE — PROGRESS NOTES
General Surgery Daily Progress Note    Admit Date: 3/18/2018  Post-Operative Day: * No surgery date entered * from Procedure(s):  COLONOSCOPY     Subjective:     Last 24 hrs: Pt has had bloody stools x3 since in hospital.  Feels a little light-headed but generally okay. H/H w/ slow trend downward  Talked about reflux pain - on IV protonix    Objective:     Blood pressure 114/63, pulse 75, temperature 98.6 °F (37 °C), resp. rate 20, height 5' 4\" (1.626 m), weight 176 lb (79.8 kg), SpO2 96 %. Temp (24hrs), Av.5 °F (36.9 °C), Min:98.3 °F (36.8 °C), Max:98.8 °F (37.1 °C)      _____________________  Physical Exam:     Alert and Oriented, x3, in no acute distress. Cardiovascular: RRR, no peripheral edema  Lungs:CTAB   Abdomen: soft, nl BS, mild tenderness low, mid abd      Assessment:   Principal Problem:    BRBPR (bright red blood per rectum) (3/18/2018)            Plan:     Colonoscopy today - prep per Dr Gogo Arreola  Monitor H/H  Cont IV PPI  following    Data Review:    Recent Labs      18   0529  18   1704  18   0849   WBC  6.4   --   10.9   HGB  9.3*  10.3*  11.6   HCT  28.1*  31.0*  35.5   PLT  171   --   231     Recent Labs      18   0529  18   0849   NA  146*  144  143   K  3.7  3.5  4.0   CL  112*  111*  107   CO2  27  29  30   GLU  84  86  93   BUN  11  10  18   CREA  0.38*  0.41*  0.49*   CA  7.8*  7.6*  8.8   ALB   --   3.7   SGOT   --   11*   ALT   --   21   INR   --   1.0     No results for input(s): AML, LPSE in the last 72 hours.         ______________________  Medications:    Current Facility-Administered Medications   Medication Dose Route Frequency    sodium chloride (NS) flush 5-10 mL  5-10 mL IntraVENous Q8H    sodium chloride (NS) flush 5-10 mL  5-10 mL IntraVENous PRN    0.9% sodium chloride infusion  75 mL/hr IntraVENous CONTINUOUS    pantoprazole (PROTONIX) 40 mg in sodium chloride (NS) 10 mL injection  40 mg IntraVENous Q12H    ondansetron (ZOFRAN) injection 4 mg  4 mg IntraVENous Q8H PRN       Nilesh Multani, NP  3/19/2018

## 2018-03-19 NOTE — PROGRESS NOTES
Primary Nurse Heriberto Jade.  NIKKY Gillespie and NIKKY Aguilar performed a dual skin assessment on this patient No impairment noted  Angel score is 22

## 2018-03-19 NOTE — ROUTINE PROCESS
TRANSFER - OUT REPORT:    Verbal report given to NIKKY Beckman (name) on Tiara Washington  being transferred to (unit) for routine progression of care       Report consisted of patients Situation, Background, Assessment and   Recommendations(SBAR). Information from the following report(s) SBAR, Kardex, ED Summary and Recent Results was reviewed with the receiving nurse. Lines:   Peripheral IV 03/18/18 Right Antecubital (Active)   Site Assessment Clean, dry, & intact 3/18/2018  8:55 AM   Phlebitis Assessment 0 3/18/2018  8:55 AM   Infiltration Assessment 0 3/18/2018  8:55 AM   Dressing Status Clean, dry, & intact 3/18/2018  8:55 AM   Dressing Type Transparent 3/18/2018  8:55 AM   Hub Color/Line Status Pink;Flushed;Patent 3/18/2018  8:55 AM   Action Taken Blood drawn 3/18/2018  8:55 AM        Opportunity for questions and clarification was provided.       Patient transported with:   [employer]

## 2018-03-20 ENCOUNTER — ANESTHESIA (OUTPATIENT)
Dept: ENDOSCOPY | Age: 63
End: 2018-03-20
Payer: COMMERCIAL

## 2018-03-20 ENCOUNTER — ANESTHESIA EVENT (OUTPATIENT)
Dept: ENDOSCOPY | Age: 63
End: 2018-03-20
Payer: COMMERCIAL

## 2018-03-20 VITALS
HEIGHT: 64 IN | OXYGEN SATURATION: 96 % | DIASTOLIC BLOOD PRESSURE: 70 MMHG | RESPIRATION RATE: 20 BRPM | BODY MASS INDEX: 30.05 KG/M2 | HEART RATE: 70 BPM | TEMPERATURE: 98.2 F | WEIGHT: 176 LBS | SYSTOLIC BLOOD PRESSURE: 112 MMHG

## 2018-03-20 PROBLEM — K62.5 BRBPR (BRIGHT RED BLOOD PER RECTUM): Status: RESOLVED | Noted: 2018-03-18 | Resolved: 2018-03-20

## 2018-03-20 PROBLEM — K57.90 DIVERTICULOSIS: Status: ACTIVE | Noted: 2018-03-20

## 2018-03-20 LAB
BASOPHILS # BLD: 0 K/UL (ref 0–0.1)
BASOPHILS NFR BLD: 1 % (ref 0–1)
DIFFERENTIAL METHOD BLD: ABNORMAL
EOSINOPHIL # BLD: 0.1 K/UL (ref 0–0.4)
EOSINOPHIL NFR BLD: 2 % (ref 0–7)
ERYTHROCYTE [DISTWIDTH] IN BLOOD BY AUTOMATED COUNT: 13.9 % (ref 11.5–14.5)
HCT VFR BLD AUTO: 26.7 % (ref 35–47)
HGB BLD-MCNC: 8.8 G/DL (ref 11.5–16)
IMM GRANULOCYTES # BLD: 0 K/UL (ref 0–0.04)
IMM GRANULOCYTES NFR BLD AUTO: 0 % (ref 0–0.5)
LYMPHOCYTES # BLD: 1.8 K/UL (ref 0.8–3.5)
LYMPHOCYTES NFR BLD: 35 % (ref 12–49)
MCH RBC QN AUTO: 30 PG (ref 26–34)
MCHC RBC AUTO-ENTMCNC: 33 G/DL (ref 30–36.5)
MCV RBC AUTO: 91.1 FL (ref 80–99)
MONOCYTES # BLD: 0.4 K/UL (ref 0–1)
MONOCYTES NFR BLD: 7 % (ref 5–13)
NEUTS SEG # BLD: 2.9 K/UL (ref 1.8–8)
NEUTS SEG NFR BLD: 55 % (ref 32–75)
NRBC # BLD: 0 K/UL (ref 0–0.01)
NRBC BLD-RTO: 0 PER 100 WBC
PLATELET # BLD AUTO: 159 K/UL (ref 150–400)
PMV BLD AUTO: 11.7 FL (ref 8.9–12.9)
RBC # BLD AUTO: 2.93 M/UL (ref 3.8–5.2)
WBC # BLD AUTO: 5.3 K/UL (ref 3.6–11)

## 2018-03-20 PROCEDURE — 77030013992 HC SNR POLYP ENDOSC BSC -B: Performed by: SPECIALIST

## 2018-03-20 PROCEDURE — 74011000250 HC RX REV CODE- 250

## 2018-03-20 PROCEDURE — 74011250636 HC RX REV CODE- 250/636

## 2018-03-20 PROCEDURE — 76060000033 HC ANESTHESIA 1 TO 1.5 HR: Performed by: SPECIALIST

## 2018-03-20 PROCEDURE — C9113 INJ PANTOPRAZOLE SODIUM, VIA: HCPCS | Performed by: INTERNAL MEDICINE

## 2018-03-20 PROCEDURE — 85025 COMPLETE CBC W/AUTO DIFF WBC: CPT | Performed by: INTERNAL MEDICINE

## 2018-03-20 PROCEDURE — 88305 TISSUE EXAM BY PATHOLOGIST: CPT | Performed by: SPECIALIST

## 2018-03-20 PROCEDURE — 36415 COLL VENOUS BLD VENIPUNCTURE: CPT | Performed by: INTERNAL MEDICINE

## 2018-03-20 PROCEDURE — 77030009426 HC FCPS BIOP ENDOSC BSC -B: Performed by: SPECIALIST

## 2018-03-20 PROCEDURE — 77030027957 HC TBNG IRR ENDOGTR BUSS -B: Performed by: SPECIALIST

## 2018-03-20 PROCEDURE — 76040000008: Performed by: SPECIALIST

## 2018-03-20 PROCEDURE — 99218 HC RM OBSERVATION: CPT

## 2018-03-20 PROCEDURE — 74011250636 HC RX REV CODE- 250/636: Performed by: INTERNAL MEDICINE

## 2018-03-20 PROCEDURE — 74011250637 HC RX REV CODE- 250/637: Performed by: SPECIALIST

## 2018-03-20 RX ORDER — ATROPINE SULFATE 0.1 MG/ML
0.5 INJECTION INTRAVENOUS
Status: DISCONTINUED | OUTPATIENT
Start: 2018-03-20 | End: 2018-03-20 | Stop reason: HOSPADM

## 2018-03-20 RX ORDER — SODIUM CHLORIDE 0.9 % (FLUSH) 0.9 %
5-10 SYRINGE (ML) INJECTION EVERY 8 HOURS
Status: COMPLETED | OUTPATIENT
Start: 2018-03-20 | End: 2018-03-20

## 2018-03-20 RX ORDER — DEXTROMETHORPHAN/PSEUDOEPHED 2.5-7.5/.8
1.2 DROPS ORAL
Status: DISCONTINUED | OUTPATIENT
Start: 2018-03-20 | End: 2018-03-20 | Stop reason: HOSPADM

## 2018-03-20 RX ORDER — FENTANYL CITRATE 50 UG/ML
200 INJECTION, SOLUTION INTRAMUSCULAR; INTRAVENOUS
Status: DISCONTINUED | OUTPATIENT
Start: 2018-03-20 | End: 2018-03-20 | Stop reason: HOSPADM

## 2018-03-20 RX ORDER — NALOXONE HYDROCHLORIDE 0.4 MG/ML
0.4 INJECTION, SOLUTION INTRAMUSCULAR; INTRAVENOUS; SUBCUTANEOUS
Status: DISCONTINUED | OUTPATIENT
Start: 2018-03-20 | End: 2018-03-20 | Stop reason: HOSPADM

## 2018-03-20 RX ORDER — EPINEPHRINE 0.1 MG/ML
1 INJECTION INTRACARDIAC; INTRAVENOUS
Status: DISCONTINUED | OUTPATIENT
Start: 2018-03-20 | End: 2018-03-20 | Stop reason: HOSPADM

## 2018-03-20 RX ORDER — FERROUS SULFATE 325(65) MG
325 TABLET, DELAYED RELEASE (ENTERIC COATED) ORAL
Qty: 90 TAB | Refills: 0 | Status: SHIPPED | OUTPATIENT
Start: 2018-03-20 | End: 2018-05-01

## 2018-03-20 RX ORDER — MIDAZOLAM HYDROCHLORIDE 1 MG/ML
.25-1 INJECTION, SOLUTION INTRAMUSCULAR; INTRAVENOUS
Status: DISCONTINUED | OUTPATIENT
Start: 2018-03-20 | End: 2018-03-20 | Stop reason: HOSPADM

## 2018-03-20 RX ORDER — SODIUM CHLORIDE 0.9 % (FLUSH) 0.9 %
5-10 SYRINGE (ML) INJECTION AS NEEDED
Status: ACTIVE | OUTPATIENT
Start: 2018-03-20 | End: 2018-03-20

## 2018-03-20 RX ORDER — SODIUM CHLORIDE 9 MG/ML
INJECTION, SOLUTION INTRAVENOUS
Status: DISCONTINUED | OUTPATIENT
Start: 2018-03-20 | End: 2018-03-20 | Stop reason: HOSPADM

## 2018-03-20 RX ORDER — LORAZEPAM 2 MG/ML
2 INJECTION INTRAMUSCULAR AS NEEDED
Status: DISCONTINUED | OUTPATIENT
Start: 2018-03-20 | End: 2018-03-20 | Stop reason: HOSPADM

## 2018-03-20 RX ORDER — PROPOFOL 10 MG/ML
INJECTION, EMULSION INTRAVENOUS AS NEEDED
Status: DISCONTINUED | OUTPATIENT
Start: 2018-03-20 | End: 2018-03-20 | Stop reason: HOSPADM

## 2018-03-20 RX ORDER — LIDOCAINE HYDROCHLORIDE 20 MG/ML
INJECTION, SOLUTION EPIDURAL; INFILTRATION; INTRACAUDAL; PERINEURAL AS NEEDED
Status: DISCONTINUED | OUTPATIENT
Start: 2018-03-20 | End: 2018-03-20 | Stop reason: HOSPADM

## 2018-03-20 RX ORDER — FLUMAZENIL 0.1 MG/ML
0.2 INJECTION INTRAVENOUS
Status: DISCONTINUED | OUTPATIENT
Start: 2018-03-20 | End: 2018-03-20 | Stop reason: HOSPADM

## 2018-03-20 RX ORDER — SODIUM CHLORIDE 9 MG/ML
50 INJECTION, SOLUTION INTRAVENOUS CONTINUOUS
Status: DISPENSED | OUTPATIENT
Start: 2018-03-20 | End: 2018-03-20

## 2018-03-20 RX ADMIN — PROPOFOL 100 MG: 10 INJECTION, EMULSION INTRAVENOUS at 12:12

## 2018-03-20 RX ADMIN — PROPOFOL 20 MG: 10 INJECTION, EMULSION INTRAVENOUS at 12:32

## 2018-03-20 RX ADMIN — PROPOFOL 40 MG: 10 INJECTION, EMULSION INTRAVENOUS at 12:27

## 2018-03-20 RX ADMIN — PROPOFOL 30 MG: 10 INJECTION, EMULSION INTRAVENOUS at 12:30

## 2018-03-20 RX ADMIN — PROPOFOL 40 MG: 10 INJECTION, EMULSION INTRAVENOUS at 12:23

## 2018-03-20 RX ADMIN — SODIUM CHLORIDE: 9 INJECTION, SOLUTION INTRAVENOUS at 11:25

## 2018-03-20 RX ADMIN — PANTOPRAZOLE SODIUM 40 MG: 40 INJECTION, POWDER, FOR SOLUTION INTRAVENOUS at 15:11

## 2018-03-20 RX ADMIN — PROPOFOL 20 MG: 10 INJECTION, EMULSION INTRAVENOUS at 12:14

## 2018-03-20 RX ADMIN — PROPOFOL 30 MG: 10 INJECTION, EMULSION INTRAVENOUS at 12:16

## 2018-03-20 RX ADMIN — LIDOCAINE HYDROCHLORIDE 20 MG: 20 INJECTION, SOLUTION EPIDURAL; INFILTRATION; INTRACAUDAL; PERINEURAL at 12:13

## 2018-03-20 RX ADMIN — SODIUM CHLORIDE: 9 INJECTION, SOLUTION INTRAVENOUS at 12:30

## 2018-03-20 RX ADMIN — Medication 10 ML: at 15:12

## 2018-03-20 RX ADMIN — PROPOFOL 30 MG: 10 INJECTION, EMULSION INTRAVENOUS at 12:34

## 2018-03-20 RX ADMIN — PROPOFOL 50 MG: 10 INJECTION, EMULSION INTRAVENOUS at 12:18

## 2018-03-20 RX ADMIN — PROPOFOL 40 MG: 10 INJECTION, EMULSION INTRAVENOUS at 12:20

## 2018-03-20 NOTE — DISCHARGE SUMMARY
Discharge Summary     Patient: Yareli Saeed MRN: [id number]  SSN: [id number]    YOB: 1955  Age: 58 y.o.   Sex: female       Admit Date: 3/18/2018    Discharge Date: 3/20/2018      Admission Diagnoses: rectal bleeding    Discharge Diagnoses:   Diverticulosis with bleeding  Acute blood loss anemia      Problem List as of 3/20/2018  Date Reviewed: 3/20/2018          Codes Class Noted - Resolved    Diverticulosis ICD-10-CM: K57.90  ICD-9-CM: 562.10  3/20/2018 - Present        Gastroesophageal reflux disease without esophagitis ICD-10-CM: K21.9  ICD-9-CM: 530.81  2/7/2018 - Present        Broken toe ICD-10-CM: S92.919A  ICD-9-CM: 826.0  Unknown - Present    Overview Signed 11/21/2017  2:48 PM by [facility]     right pinky toe             B12 deficiency ICD-10-CM: E53.8  ICD-9-CM: 266.2  11/21/2017 - Present        H/O hyperparathyroidism ICD-10-CM: Z86.39  ICD-9-CM: V12.29  6/20/2017 - Present        Hypercalcemia ICD-10-CM: E83.52  ICD-9-CM: 275.42  2/21/2017 - Present        Active advance directive on file ICD-10-CM: Z78.9  ICD-9-CM: V49.89  4/18/2016 - Present        Onychomycosis ICD-10-CM: B35.1  ICD-9-CM: 110.1  7/23/2014 - Present        Osteoporosis ICD-10-CM: M81.0  ICD-9-CM: 733.00  5/13/2014 - Present        Iron deficiency anemia, unspecified ICD-10-CM: D50.9  ICD-9-CM: 280.9  5/13/2014 - Present        Insomnia, unspecified ICD-10-CM: G47.00  ICD-9-CM: 780.52  4/23/2014 - Present        Grief reaction ICD-10-CM: F43.20  ICD-9-CM: 309.0  4/23/2014 - Present        Pernicious anemia ICD-10-CM: D51.0  ICD-9-CM: 281.0  4/23/2014 - Present        Vitamin D deficiency ICD-10-CM: E55.9  ICD-9-CM: 268.9  4/23/2014 - Present        Reflux esophagitis ICD-10-CM: K21.0  ICD-9-CM: 530.11  4/23/2014 - Present        Gastric ulcer ICD-10-CM: K25.9  ICD-9-CM: 531.90  4/23/2014 - Present        Symptomatic menopausal or female climacteric states ICD-10-CM: N95.1  ICD-9-CM: 627.2  4/23/2014 - Present        Cellulitis of shoulder ICD-10-CM: L03.119  ICD-9-CM: 682.3  4/8/2013 - Present        Post op infection ICD-10-CM: T81. 4XXA  ICD-9-CM: 998.59  11/7/2012 - Present        Closed fracture of left proximal humerus ICD-10-CM: S42.202A  ICD-9-CM: 812.00  4/25/2012 - Present        * (Principal)RESOLVED: BRBPR (bright red blood per rectum) ICD-10-CM: K62.5  ICD-9-CM: 569.3  3/18/2018 - 3/20/2018        RESOLVED: Morbid obesity ([address] ICD-10-CM: E66.01  ICD-9-CM: 278.01  3/23/2011 - 6/20/2017        RESOLVED: Reflux ICD-10-CM: K21.9  ICD-9-CM: 530.81  3/23/2011 - 4/14/2011               Discharge Condition: Stable    Hospital Course: Ms. Pili Lucas presented with GI bleeding. Colonoscopy showed diverticulosis without bleeding and 2 polyps which were removed and sent to pathology. Bleeding resolved. She remained hemodynamically stable. See progress note from today. Consults: Gastroenterology    Significant Diagnostic Studies: see above    Disposition: home    Discharge Medications:   Current Discharge Medication List      START taking these medications    Details   ferrous sulfate (IRON) 325 mg (65 mg iron) EC tablet Take 1 Tab by mouth three (3) times daily (with meals). Qty: 90 Tab, Refills: 0         CONTINUE these medications which have NOT CHANGED    Details   cholecalciferol (VITAMIN D3) 1,000 unit tablet Take 1 Tab by mouth daily. Qty: 90 Tab, Refills: 3      calcium carbonate (CALTREX) 600 mg calcium (1,500 mg) tablet Take 1 Tab by mouth daily. Qty: 90 Tab, Refills: 3      psyllium (METAMUCIL) 0.52 gram capsule Take 1 Cap by mouth daily. Qty: 90 Cap, Refills: 3      MELATONIN PO Take 5 mg by mouth nightly as needed. cyanocobalamin (VITAMIN B-12) 1,000 mcg tablet Take 1,000 mcg by mouth daily. sucralfate (CARAFATE) 1 gram tablet Take 1 g by mouth three (3) times daily. DOCUSATE SODIUM (STOOL SOFTENER PO) Take  by mouth two (2) times a day.            Follow-up Information     Follow up With Details Comments Contact Info    Justin Martinez MD Schedule an appointment as soon as possible for a visit  [address] [facility]  [address]  [address]      Marcina Ormond, MD Schedule an appointment as soon as possible for a visit  [address]  [phone]            Signed By: Anjali Arteaga MD     March 20, 2018      Greater than 30 minutes spent on patient care/counseling/discharge management.

## 2018-03-20 NOTE — PROGRESS NOTES
Bedside and Verbal shift change report given to Ambreen Owen (oncoming nurse) by Kennedi Tang (offgoing nurse). Report included the following information SBAR, Kardex, Procedure Summary, Intake/Output, MAR, Accordion and Recent Results.

## 2018-03-20 NOTE — PROGRESS NOTES
Full note to follow. 2 tiny polyps removed of no significance. Mild-moderate diverticulosis of colon. Later is probable source of bleeding. Start diet. Will discuss with patient at 5 could be dc'd after that from GI standpoint.

## 2018-03-20 NOTE — PROGRESS NOTES
Discharge teaching given to patient. Rx for iron given to patient. Discussed the need to follow up with her PCP and Dr. Maria Del Carmen Monzon. IV taken out. Patient escorted to her car.

## 2018-03-20 NOTE — ANESTHESIA PREPROCEDURE EVALUATION
Anesthetic History   No history of anesthetic complications            Review of Systems / Medical History  Patient summary reviewed, nursing notes reviewed and pertinent labs reviewed    Pulmonary          Smoker         Neuro/Psych   Within defined limits           Cardiovascular  Within defined limits                     GI/Hepatic/Renal     GERD      PUD     Endo/Other        Arthritis     Other Findings              Physical Exam    Airway  Mallampati: II  TM Distance: > 6 cm  Neck ROM: normal range of motion   Mouth opening: Normal     Cardiovascular  Regular rate and rhythm,  S1 and S2 normal,  no murmur, click, rub, or gallop             Dental  No notable dental hx       Pulmonary  Breath sounds clear to auscultation               Abdominal  GI exam deferred       Other Findings            Anesthetic Plan    ASA: 3  Anesthesia type: MAC          Induction: Intravenous  Anesthetic plan and risks discussed with: Patient

## 2018-03-20 NOTE — ROUTINE PROCESS
Thu Palm  1955  [id number]    Situation:  Verbal report received from: Clay Andres  Procedure: Procedure(s):  COLONOSCOPY  ENDOSCOPIC POLYPECTOMY    Background:    Preoperative diagnosis: Colon  Postoperative diagnosis: 1. Diverticulosis  2. Sigmoid Colon Polyp  3. Rectal Colon Polyp    :  Dr. Jovany Nunez  Assistant(s): Endoscopy Technician-1: Giuliana Buenrostro  Endoscopy RN-1: Juan F Celestin    Specimens:   ID Type Source Tests Collected by Time Destination   1 : sigmoid polyp Preservative Sigmoid  Fabiola Raya MD 3/20/2018 1233 Pathology   2 : rectal polyp Preservative Rectum  Fabiola Raya MD 3/20/2018 1236 Pathology     H. Pylori  no    Assessment:  Intra-procedure medications     Anesthesia gave intra-procedure sedation and medications, see anesthesia flow sheet yes    Intravenous fluids: NS@ KVO     Vital signs stable     Abdominal assessment: round and soft     Recommendation:  Discharge patient per MD order.   Return to [address] [place] [address] [employer] or Friend   Permission to share finding with family or friend yes

## 2018-03-20 NOTE — PERIOP NOTES
TRANSFER - IN REPORT:    [facility] (name) on [facility]  being received from [phone] for ordered procedure      Report consisted of patients Situation, Background, Assessment and   Recommendations(SBAR). Information from the following report(s) SBAR, ED Summary, [facility] and Recent Results was reviewed with the receiving nurse. Opportunity for questions and clarification was provided. Assessment completed upon patients arrival to unit and care assumed.

## 2018-03-20 NOTE — PERIOP NOTES

## 2018-03-20 NOTE — PROGRESS NOTES
March 20, 2018       RE: Amarilis Harrison      To Whom It May Concern,    This is to certify that Amarilis Harrison was hospitalized at [facility] from 3/18/18 to 3/20/18. She may return to work on 3/23/18. Please feel free to contact my office if you have any questions or concerns. Thank you for your assistance in this matter. Sincerely,  Milton Carias MD  [facility], [facility]  Adult Hospitalist Department  [address] [place] [address].   [facility], [address]  [phone] normal

## 2018-03-20 NOTE — DISCHARGE INSTRUCTIONS
Discharge Instructions       PATIENT ID: Eugene Grier  MRN: [id number]   YOB: 1955    DATE OF ADMISSION: 3/18/2018 10:59 AM    DATE OF DISCHARGE: 3/20/2018    PRIMARY CARE PROVIDER: Lenetta Buerger, MD     ATTENDING PHYSICIAN: Aury Galarza MD  DISCHARGING PROVIDER: Aury Galarza MD    To contact this individual call [phone] and ask the  to page. If unavailable ask to be transferred the Adult Hospitalist Department. DISCHARGE DIAGNOSES GI bleeding, diverticulosis, anemia    CONSULTATIONS: IP CONSULT TO GENERAL SURGERY  IP CONSULT TO HOSPITALIST  IP CONSULT TO GASTROENTEROLOGY    PROCEDURES/SURGERIES: Procedure(s):  COLONOSCOPY  ENDOSCOPIC POLYPECTOMY    PENDING TEST RESULTS:   At the time of discharge the following test results are still pending: pathology of colon polyps    FOLLOW UP APPOINTMENTS:   Follow-up Information     Follow up With Details Comments [address] [place] [address] Virgilio Lincoln MD Schedule an appointment as soon as possible for a visit  [address] [place] [address]   [address] [place] [address]  [address]      Chinedu Oviedo MD Schedule an appointment as soon as possible for a visit  [place] [address]  [phone]             ADDITIONAL CARE RECOMMENDATIONS:   You were admitted with gastrointestinal bleeding which has resolved. This was most likely due to diverticulosis. DIET: Regular Diet, increase fiber intake      ACTIVITY: Activity as tolerated    WOUND CARE: n/a    EQUIPMENT needed: n/a      DISCHARGE MEDICATIONS:   See Medication Reconciliation Form    · It is important that you take the medication exactly as they are prescribed. · Keep your medication in the bottles provided by the pharmacist and keep a list of the medication names, dosages, and times to be taken in your wallet. · Do not take other medications without consulting your doctor.        NOTIFY YOUR PHYSICIAN FOR ANY OF THE FOLLOWING:   Fever over 101 degrees for 24 hours. Chest pain, shortness of breath, fever, chills, nausea, vomiting, diarrhea, change in mentation, falling, weakness, bleeding. Severe pain or pain not relieved by medications. Or, any other signs or symptoms that you may have questions about.       DISPOSITION:  x  Home With:   OT  PT  HH  RN       SNF/Inpatient Rehab/LTAC    Independent/assisted living    Hospice    Other:     CDMP Checked:   Yes x     PROBLEM LIST Updated:  Yes x       Signed:   Ji Vital MD  3/20/2018  4:40 PM

## 2018-03-20 NOTE — PROGRESS NOTES
Hospitalist Progress Note          Date of Service:  3/20/2018  NAME:  Marlo Tamayo  :  1955  MRN:  [id number]      Admission Summary:     Marlo Tamayo is a 58 y.o. female with medical history of Obesity s/p bypass surgery (17 years back), GERD (on sucralfate), and obese started to having bloody BM since mid night for a total of three times till the time of admission. The bleeding is bright red in nature and covering his toilet seat. Interval history / Subjective:       States bleeding resolved with colon prep. Denies abd pain, no n/v/d. Admits to mild dizziness with standing. Assessment & Plan:       Rectal bleeding  CT abd : An active source of GI bleeding is not demonstrated  F/u colonoscopy today  GI consulted    Acute blood loss anemia: (POA) Hgb 11.6 -> 8.8  -monitor H/H  -transfuse PRN    GERD     History of thyroid surgery     Code status: full   DVT prophylaxis:     Care Plan discussed with: Patient/Family  Disposition: Home w/Family and TBD     Hospital Problems  Date Reviewed: 3/20/2018          Codes Class Noted POA    * (Principal)BRBPR (bright red blood per rectum) ICD-10-CM: K62.5  ICD-9-CM: 569.3  3/18/2018 Unknown                Review of Systems:   Pertinent items are noted in HPI. Vital Signs:    Last 24hrs VS reviewed since prior progress note. Most recent are:  Visit Vitals    /66    Pulse 64    Temp 98.1 °F (36.7 °C)    Resp 20    Ht 5' 4\" (1.626 m)    Wt 79.8 kg (176 lb)    SpO2 96%    BMI 30.21 kg/m2         Intake/Output Summary (Last 24 hours) at 18 1241  Last data filed at 18 1230   Gross per 24 hour   Intake              500 ml   Output                0 ml   Net              500 ml        Physical Examination:             Constitutional:  NAD, non-toxic   ENT:  anicteric sclerae, pale conjunctiva, MMM    Resp:  CTA bilaterally. No wheezing/rhonchi/rales.  No accessory muscle use   CV:  Regular rhythm, normal rate, no murmurs, gallops, rubs    GI:  Soft, non distended, non tender. normoactive bowel sounds    Musculoskeletal:  No edema, warm    Neurologic:  CN grossly intact, normal speech, moves all extremities  Psych: calm, not anxious nor agitated            Data Review:    Review and/or order of clinical lab test      Labs:     Recent Labs      03/20/18   0503  03/19/18   1612  03/19/18   0529   WBC  5.3   --   6.4   HGB  8.8*  9.2*  9.3*   HCT  26.7*  27.8*  28.1*   PLT  159   --   171     Recent Labs      03/19/18   0529  03/18/18   0849   NA  146*  144  143   K  3.7  3.5  4.0   CL  112*  111*  107   CO2  27  29  30   BUN  11  10  18   CREA  0.38*  0.41*  0.49*   GLU  84  86  93   CA  7.8*  7.6*  8.8     Recent Labs      03/18/18   0849   SGOT  11*   ALT  21   AP  47   TBILI  0.4   TP  6.9   ALB  3.7   GLOB  3.2     Recent Labs      03/18/18   0849   INR  1.0   PTP  10.3   APTT  22.9      No results for input(s): FE, TIBC, PSAT, FERR in the last 72 hours. Lab Results   Component Value Date/Time    Folate 13.9 04/15/2011 10:35 AM      No results for input(s): PH, PCO2, PO2 in the last 72 hours. No results for input(s): CPK, CKNDX, TROIQ in the last 72 hours.     No lab exists for component: CPKMB  Lab Results   Component Value Date/Time    Cholesterol, total 175 12/04/2017 12:00 AM    HDL Cholesterol 75 12/04/2017 12:00 AM    LDL, calculated 82 12/04/2017 12:00 AM    Triglyceride 91 12/04/2017 12:00 AM     No results found for: [facility]  Lab Results   Component Value Date/Time    Color YELLOW/STRAW 03/18/2018 10:07 AM    Appearance CLEAR 03/18/2018 10:07 AM    Specific gravity 1.013 03/18/2018 10:07 AM    pH (UA) 6.0 03/18/2018 10:07 AM    Protein NEGATIVE  03/18/2018 10:07 AM    Glucose NEGATIVE  03/18/2018 10:07 AM    Ketone NEGATIVE  03/18/2018 10:07 AM    Bilirubin NEGATIVE  03/18/2018 10:07 AM    Urobilinogen 0.2 03/18/2018 10:07 AM    Nitrites NEGATIVE 03/18/2018 10:07 AM    Leukocyte Esterase TRACE (A) 03/18/2018 10:07 AM    Epithelial cells FEW 03/18/2018 10:07 AM    Bacteria NEGATIVE  03/18/2018 10:07 AM    WBC 0-4 03/18/2018 10:07 AM    RBC 0-5 03/18/2018 10:07 AM         Medications Reviewed:     Current Facility-Administered Medications   Medication Dose Route Frequency    0.9% sodium chloride infusion  50 mL/hr IntraVENous CONTINUOUS    sodium chloride (NS) flush 5-10 mL  5-10 mL IntraVENous Q8H    sodium chloride (NS) flush 5-10 mL  5-10 mL IntraVENous PRN    LORazepam (ATIVAN) injection 2 mg  2 mg IntraVENous PRN    midazolam (VERSED) injection 0.25-10 mg  0.25-10 mg IntraVENous Multiple    fentaNYL citrate (PF) injection 200 mcg  200 mcg IntraVENous Multiple    naloxone (NARCAN) injection 0.4 mg  0.4 mg IntraVENous Multiple    flumazenil (ROMAZICON) 0.1 mg/mL injection 0.2 mg  0.2 mg IntraVENous Multiple    simethicone (MYLICON) 91WR/5.6FS oral drops 80 mg  1.2 mL Oral Multiple    atropine injection 0.5 mg  0.5 mg IntraVENous ONCE PRN    EPINEPHrine (ADRENALIN) 0.1 mg/mL syringe 1 mg  1 mg Endoscopically ONCE PRN    glucagon (GLUCAGEN) injection 1 mg  1 mg IntraVENous ONCE PRN    0.45% sodium chloride infusion  75 mL/hr IntraVENous CONTINUOUS    sodium chloride (NS) flush 5-10 mL  5-10 mL IntraVENous Q8H    sodium chloride (NS) flush 5-10 mL  5-10 mL IntraVENous PRN    pantoprazole (PROTONIX) 40 mg in sodium chloride (NS) 10 mL injection  40 mg IntraVENous Q12H    ondansetron (ZOFRAN) injection 4 mg  4 mg IntraVENous Q8H PRN     ______________________________________________________________________  EXPECTED LENGTH OF STAY: - - -  ACTUAL LENGTH OF STAY:          0                 Hernandez Nieves MD

## 2018-03-20 NOTE — PERIOP NOTES
TRANSFER - OUT REPORT:    Verbal report given to Faber Liliana on Eugene Carrera  being transferred to  for routine progression of care       Report consisted of patients Situation, Background, Assessment and   Recommendations(SBAR). Information from the following report(s) SBAR and Procedure Summary was reviewed with the receiving nurse. Lines:   Peripheral IV 03/18/18 Right Antecubital (Active)   Site Assessment Clean, dry, & intact 3/20/2018  8:52 AM   Phlebitis Assessment 0 3/20/2018  8:52 AM   Infiltration Assessment 0 3/20/2018  8:52 AM   Dressing Status Clean, dry, & intact 3/20/2018  8:52 AM   Dressing Type Transparent 3/20/2018  8:52 AM   Hub Color/Line Status Pink; Infusing 3/20/2018  8:52 AM   Action Taken Open ports on tubing capped 3/20/2018  5:02 AM   Alcohol Cap Used Yes 3/20/2018  5:02 AM        Opportunity for questions and clarification was provided.       Patient transported with:   [employer]

## 2018-03-20 NOTE — PROCEDURES
Colonoscopy Procedure Note    Indications:   Lower GI bleeding - painless, s/p gastric bypass  Referring Physician: Minal Garcia MD   Anesthesia/Sedation:MAC  Endoscopist:  Dr. Yonatan Paula  Assistant:  Endoscopy Technician-1: Kerrie Conklin  Endoscopy RN-1: Dominguez Strauss    Preoperative diagnosis: Colon    Postoperative diagnosis: 1. Diverticulosis  2. Sigmoid Colon Polyp  3. Rectal Colon Polyp      Procedure in Detail:  Informed consent was obtained for the procedure, including sedation. Risks of perforation, hemorrhage, adverse drug reaction, and aspiration were discussed. The patient was placed in the left lateral decubitus position. Based on the pre-procedure assessment, including review of the patient's medical history, medications, allergies, and review of systems, she had been deemed to be an appropriate candidate for moderate sedation; she was therefore sedated with the medications listed above. The patient was monitored continuously with ECG tracing, pulse oximetry, blood pressure monitoring, and direct observations. A rectal examination was performed. The [id number] was inserted into the rectum and advanced under direct vision to the terminal ileum. The quality of the colonic preparation was excellent. A careful inspection was made as the colonoscope was withdrawn, including a retroflexed view of the rectum; findings and interventions are described below. Findings:   Rectum: 2 mm polyp removed with cold forceps  Sigmoid: moderate diverticulosis; 4 mm polyp removed with cold snare  Descending Colon: moderate diverticulosis;  Transverse Colon: mild diverticulosis; Ascending Colon: normal  Cecum: normal  Terminal Ileum: normal    Specimens:     see above    EBL: None    Complications: None; patient tolerated the procedure well. Recommendations:     - Await pathology. - Repeat colonoscopy in 5 years.      - If < 10 years, reason: above average risk patient     - Suspect bleeding was diverticular, doubt small bowel or UGI source, patient s/p gastric bypass    Signed By: Casandra Albarran MD                        March 20, 2018

## 2018-03-21 NOTE — ANESTHESIA POSTPROCEDURE EVALUATION
Post-Anesthesia Evaluation and Assessment    Patient: Mayela Penaloza MRN: [id number]  SSN: [id number]    YOB: 1955  Age: 58 y.o. Sex: female       Cardiovascular Function/Vital Signs  Visit Vitals    /70 (BP 1 Location: Left arm, BP Patient Position: At rest)    Pulse 70    Temp 36.8 °C (98.2 °F)    Resp 20    Ht 5' 4\" (1.626 m)    Wt 79.8 kg (176 lb)    SpO2 96%    BMI 30.21 kg/m2       Patient is status post MAC anesthesia for Procedure(s):  COLONOSCOPY  ENDOSCOPIC POLYPECTOMY. Nausea/Vomiting: None    Postoperative hydration reviewed and adequate. Pain:  Pain Scale 1: Numeric (0 - 10) (03/20/18 1611)  Pain Intensity 1: 0 (03/20/18 1611)   Managed    Neurological Status: At baseline    Mental Status and Level of Consciousness: Arousable    Pulmonary Status:   O2 Device: Room air (03/20/18 1611)   Adequate oxygenation and airway patent    Complications related to anesthesia: None    Post-anesthesia assessment completed.  No concerns    Signed By: Gale Cruz MD     March 21, 2018

## 2018-04-12 ENCOUNTER — HOSPITAL ENCOUNTER (OUTPATIENT)
Age: 63
Setting detail: OUTPATIENT SURGERY
Discharge: HOME | End: 2018-04-12
Attending: SPECIALIST | Admitting: SPECIALIST
Payer: COMMERCIAL

## 2018-04-12 VITALS
TEMPERATURE: 98.1 F | RESPIRATION RATE: 16 BRPM | WEIGHT: 176 LBS | HEART RATE: 66 BPM | BODY MASS INDEX: 30.05 KG/M2 | HEIGHT: 64 IN | SYSTOLIC BLOOD PRESSURE: 148 MMHG | OXYGEN SATURATION: 98 % | DIASTOLIC BLOOD PRESSURE: 79 MMHG

## 2018-04-12 PROCEDURE — 76040000019: Performed by: SPECIALIST

## 2018-04-12 PROCEDURE — 74011000250 HC RX REV CODE- 250: Performed by: SPECIALIST

## 2018-04-12 RX ORDER — LIDOCAINE HYDROCHLORIDE 20 MG/ML
JELLY TOPICAL ONCE
Status: COMPLETED | OUTPATIENT
Start: 2018-04-12 | End: 2018-04-12

## 2018-04-12 RX ADMIN — LIDOCAINE HYDROCHLORIDE 5 MG: 20 JELLY TOPICAL at 14:21

## 2018-04-12 NOTE — IP AVS SNAPSHOT
[address] [facility] 
[phone] Patient: Joanna Lovett MRN: [id number] OBM:2/63/8769 About your hospitalization You were admitted on:  April 12, 2018 You last received care in the:  [facility] END[facility] You were discharged on:  April 12, 2018 Why you were hospitalized Your primary diagnosis was:  Not on File Follow-up Information None Your Scheduled Appointments Tuesday May 01, 2018  8:00 AM EDT  
PREADMISSION TEST with [facility] PAT EXAM RM 2 [address] [facility] ([address]) [facility] [address] [place] [address] [address]  
[phone] Tuesday May 01, 2018 10:00 AM EDT HISTORY AND PHYSICAL with Ami Hess NP  
[facility] GENERAL SURGERY [address] ([address]) [address] [place] [phone] Monday May 07, 2018 GASTRIC BYPASS REVISION LAPAROSCOPIC with Uziel Baker MD  
[facility] SURGERY (RI OR PRE ASSESSMENT) [address] [place] [address]  
[phone] Discharge Orders None A check viktor indicates which time of day the medication should be taken. My Medications ASK your doctor about these medications Instructions Each Dose to Equal  
 Morning Noon Evening Bedtime  
 calcium carbonate 600 mg calcium (1,500 mg) tablet Commonly known as:  [place] Blare Your last dose was: Your next dose is: Take 1 Tab by mouth daily. 600 mg CARAFATE 1 gram tablet Generic drug:  sucralfate Your last dose was: Your next dose is: Take 1 g by mouth three (3) times daily. 1 g  
    
   
   
   
  
 cholecalciferol 1,000 unit tablet Commonly known as:  VITAMIN D3 Your last dose was: Your next dose is: Take 1 Tab by mouth daily. 1000 Units ferrous sulfate 325 mg (65 mg iron) EC tablet Commonly known as:  IRON Your last dose was: Your next dose is: Take 1 Tab by mouth three (3) times daily (with meals). 325 mg  
    
   
   
   
  
 MELATONIN PO Your last dose was: Your next dose is: Take 5 mg by mouth nightly as needed. 5 mg  
    
   
   
   
  
 psyllium 0.52 gram capsule Commonly known as:  METAMUCIL Your last dose was: Your next dose is: Take 1 Cap by mouth daily. 1 Cap STOOL SOFTENER PO Your last dose was: Your next dose is: Take  by mouth two (2) times a day. VITAMIN B-12 1,000 mcg tablet Generic drug:  cyanocobalamin Your last dose was: Your next dose is: Take 1,000 mcg by mouth daily. 1000 mcg Discharge Instructions Carolyn Zabala [id number] 1955 MANOMETRY DISCHARGE INSTRUCTION You may resume your regular diet as tolerated. You may resume your normal daily activities. If you develop a sore throat- throat lozenges or warm salt water gargles will help. Call your Physician if you have any complications or questions. Wiz Maps Activation Thank you for requesting access to Wiz Maps. Please follow the instructions below to securely access and download your online medical record. Wiz Maps allows you to send messages to your doctor, view your test results, renew your prescriptions, schedule appointments, and more. How Do I Sign Up? 1. In your internet browser, go to [email or web address] 
2. Click on the First Time User? Click Here link in the Sign In box. You will be redirect to the New Member Sign Up page. 3. Enter your Wiz Maps Access Code exactly as it appears below. You will not need to use this code after youve completed the sign-up process.  If you do not sign up before the expiration date, you must request a new code. iSoccer Access Code: Activation code not generated Current iSoccer Status: Active (This is the date your iSoccer access code will ) 4. Enter the last four digits of your Social Security Number (xxxx) and Date of Birth (mm/dd/yyyy) as indicated and click Submit. You will be taken to the next sign-up page. 5. Create a Yappet ID. This will be your iSoccer login ID and cannot be changed, so think of one that is secure and easy to remember. 6. Create a iSoccer password. You can change your password at any time. 7. Enter your Password Reset Question and Answer. This can be used at a later time if you forget your password. 8. Enter your e-mail address. You will receive e-mail notification when new information is available in [address] 9. Click Sign Up. You can now view and download portions of your medical record. 10. Click the Download Summary menu link to download a portable copy of your medical information. Additional Information If you have questions, please visit the Frequently Asked Questions section of the iSoccer website at [email or web address]/. Remember, iSoccer is NOT to be used for urgent needs. For medical emergencies, dial 911. Introducing [facility] & HEALTH SERVICES! Dear Nilesh Decker: Thank you for requesting a iSoccer account. Our records indicate that you already have an active iSoccer account. You can access your account anytime at [email or web address] [employer]Garden[employer] Did you know that you can access your hospital and ER discharge instructions at any time in iSoccer? You can also review all of your test results from your hospital stay or ER visit. Additional Information If you have questions, please visit the Frequently Asked Questions section of the iSoccer website at [email or web address] [email or web address]/. Remember, MyChart is NOT to be used for urgent needs. For medical emergencies, dial 911. Now available from your iPhone and Android! Introducing Ruiz Burnett As a Marla Willis patient, I wanted to make you aware of our electronic visit tool called Ruiz Burnett. Cintric 24/7 allows you to connect within minutes with a medical provider 24 hours a day, seven days a week via a mobile device or tablet or logging into a secure website from your computer. You can access Ruiz Burnett from anywhere in the [place]. A virtual visit might be right for you when you have a simple condition and feel like you just dont want to get out of bed, or cant get away from work for an appointment, when your regular Marla Willis provider is not available (evenings, weekends or holidays), or when youre out of town and need minor care. Electronic visits cost only $49 and if the Doyne Sprout 24/7 provider determines a prescription is needed to treat your condition, one can be electronically transmitted to a nearby pharmacy*. Please take a moment to enroll today if you have not already done so. The enrollment process is free and takes just a few minutes. To enroll, please download the Doyne Sprout 24/"[employer] yony to your tablet or phone, or visit [email or web address] to enroll on your computer. And, as an [address] [place] [address] patient with a Muchasa account, the results of your visits will be scanned into your electronic medical record and your primary care provider will be able to view the scanned results. We urge you to continue to see your regular Marla Willis provider for your ongoing medical care. And while your primary care provider may not be the one available when you seek a Ruiz Silvasamfin virtual visit, the peace of mind you get from getting a real diagnosis real time can be priceless.    
 
For more information on Ruiz Ricardosamfin, view our Frequently Asked Questions (FAQs) at [email or web address]. Sincerely, 
 
Jerson Berg MD 
Chief Medical Officer [address] *:  certain medications cannot be prescribed via Ruiz Burnett Providers Seen During Your Hospitalization Provider Specialty Primary office phone Hardeep Cole MD Gastroenterology [phone] Your Primary Care Physician (PCP) Primary Care Physician Office Phone Office Fax John Hernandez [phone] You are allergic to the following Allergen Reactions Codeine Hives Tolerates Dilaudid Recent Documentation Height Weight Breastfeeding? BMI OB Status Smoking Status 1.626 m 79.8 kg No 30.21 kg/m2 Hysterectomy Current Every Day Smoker Emergency Contacts Name Discharge Info Relation Home Work Mobile [address] [employer] CAREGIVER [3] Son [22] [phone] CAREGIVER [3] Sister [23] [phone] Cec[address]ang CAREGIVER [3] Other Relative [6] [phone] Samaria Messer  Child [2] [phone] Patient Belongings The following personal items are in your possession at time of discharge: 
  Dental Appliances: None  Visual Aid: Glasses Please provide this summary of care documentation to your next provider. Signatures-by signing, you are acknowledging that this After Visit Summary has been reviewed with you and you have received a copy. Patient Signature:  ____________________________________________________________ Date:  ____________________________________________________________  
  
Claudene Born Provider Signature:  ____________________________________________________________ Date:  ____________________________________________________________

## 2018-04-12 NOTE — DISCHARGE INSTRUCTIONS
Brenda Clarke  [id number]  1955      MANOMETRY DISCHARGE INSTRUCTION    You may resume your regular diet as tolerated. You may resume your normal daily activities. If you develop a sore throat- throat lozenges or warm salt water gargles will help. Call your Physician if you have any complications or questions. FANCRU Activation    Thank you for requesting access to FANCRU. Please follow the instructions below to securely access and download your online medical record. FANCRU allows you to send messages to your doctor, view your test results, renew your prescriptions, schedule appointments, and more. How Do I Sign Up? 1. In your internet browser, go to [email or web address]  2. Click on the First Time User? Click Here link in the Sign In box. You will be redirect to the New Member Sign Up page. 3. Enter your FANCRU Access Code exactly as it appears below. You will not need to use this code after youve completed the sign-up process. If you do not sign up before the expiration date, you must request a new code. FANCRU Access Code: Activation code not generated  Current FANCRU Status: Active (This is the date your FANCRU access code will )    4. Enter the last four digits of your Social Security Number (xxxx) and Date of Birth (mm/dd/yyyy) as indicated and click Submit. You will be taken to the next sign-up page. 5. Create a FANCRU ID. This will be your FANCRU login ID and cannot be changed, so think of one that is secure and easy to remember. 6. Create a FANCRU password. You can change your password at any time. 7. Enter your Password Reset Question and Answer. This can be used at a later time if you forget your password. 8. Enter your e-mail address. You will receive e-mail notification when new information is available in [address] 9. Click Sign Up. You can now view and download portions of your medical record.   10. Click the Download Summary menu link to download a portable copy of your medical information. Additional Information    If you have questions, please visit the Frequently Asked Questions section of the [facility] website at [email or web address] Remember, [facility] is NOT to be used for urgent needs. For medical emergencies, dial 911.

## 2018-04-12 NOTE — IP AVS SNAPSHOT
[address] [facility] 
[phone] Patient: Ashley Baeza MRN: [id number] A check viktor indicates which time of day the medication should be taken. My Medications ASK your doctor about these medications Instructions Each Dose to Equal  
 Morning Noon Evening Bedtime  
 calcium carbonate 600 mg calcium (1,500 mg) tablet Commonly known as:  Yara Charissa Your last dose was: Your next dose is: Take 1 Tab by mouth daily. 600 mg CARAFATE 1 gram tablet Generic drug:  sucralfate Your last dose was: Your next dose is: Take 1 g by mouth three (3) times daily. 1 g  
    
   
   
   
  
 cholecalciferol 1,000 unit tablet Commonly known as:  VITAMIN D3 Your last dose was: Your next dose is: Take 1 Tab by mouth daily. 1000 Units  
    
   
   
   
  
 ferrous sulfate 325 mg (65 mg iron) EC tablet Commonly known as:  IRON Your last dose was: Your next dose is: Take 1 Tab by mouth three (3) times daily (with meals). 325 mg  
    
   
   
   
  
 MELATONIN PO Your last dose was: Your next dose is: Take 5 mg by mouth nightly as needed. 5 mg  
    
   
   
   
  
 psyllium 0.52 gram capsule Commonly known as:  METAMUCIL Your last dose was: Your next dose is: Take 1 Cap by mouth daily. 1 Cap STOOL SOFTENER PO Your last dose was: Your next dose is: Take  by mouth two (2) times a day. VITAMIN B-12 1,000 mcg tablet Generic drug:  cyanocobalamin Your last dose was: Your next dose is: Take 1,000 mcg by mouth daily. 1000 mcg

## 2018-04-16 ENCOUNTER — TELEPHONE (OUTPATIENT)
Dept: SURGERY | Age: 63
End: 2018-04-16

## 2018-04-16 NOTE — TELEPHONE ENCOUNTER
I called the patient and I let her know that I have completed her FMLA paper work from [employer] but I do not have a signed release of information from her, she said she will call her HR department and get me the release form.

## 2018-04-18 NOTE — TELEPHONE ENCOUNTER
I called the patient and she said she just got her release form in the mail and she is going to fax it to me now. I told her I will then fax her form to [employer] in agreement.

## 2018-04-29 NOTE — OP NOTES
[address] REPORT    Mili TITUS  MR#: [id number]  : 1955  ACCOUNT #: [de-identified]   DATE OF SERVICE: 2018    PREPROCEDURE DIAGNOSES:  1. Gastroesophageal reflux disease. 2.  Surgery is under consideration. PROCEDURES PLANNED AND PERFORMED:  1. High resolution esophageal manometry. 2.  Impedance esophageal manometry. POSTPROCEDURE DIAGNOSES:  1. Normal relaxation of esophagogastric junction. 2.  Weak peristalsis with small defects. SURGEON:  Simon    ASSISTANT:  Trish Valero RN    ANESTHESIA:  Topical     ESTIMATED BLOOD LOSS:  none    SPECIMENS REMOVED:  non    COMPLICATIONS:  none    IMPLANTS:  none    DESCRIPTION OF PROCEDURE:  High resolution esophageal manometry was performed by the nursing staff with subsequent interpretation with Dr. Dalia Wilburn. The lower esophageal sphincter was at 42.2 cm after a distance of 3 cm distally. The lower esophageal sphincter pressures are as follows:  Respiratory minimum 32.5. Respiratory mean 42.1. Residual after swallowing 7.6 (normal is 4.832, 42.1, 4.832, 13-43 and less than 15). The upper esophageal sphincter pressure was not reported here. This is not a reliable test for measurement of or interpretation of clinical upper esophageal sphincter disorders. Wet swallows were then administered. All are peristaltic. The mean amplitude in the distal esophagus is normal at 87.6 mmHg. The wave duration was normal at 3 seconds. There were no double and no triple peaked waves. High resolution scoring is normal and is as follows:  DCI 1480.2, contractile front velocity 4.8, intrabolus pressure at lower esophageal sphincter 0.4, intrabolus pressure average maximum body of the esophagus 13.1. Tieton scoring is as follows:  Distal latency 5.6.   Percent failed zero, percent pan esophageal pressurization zero, percent premature zero, percent rapid zero, percent large breaks zero, percent small breaks 80.    Impedance manometry is performed with the flavored electrolyte solution. All impedance boluses empty completely. Small peristaltic breaks were first to transition zone defects between the proximal striated muscle esophagus and the middle smooth muscle esophagus. No specific treatment is available. Other than this finding, this is a normal manometry.       MD MOOKIE BriceK / NEHEMIAH  D: 04/29/2018 16:23     T: 04/29/2018 19:11  JOB #: [id number]  CC: Gricelda Webb MD  CC: Arianna Garcia MD  CC: Robert Banerjee MD  CC: Rosi Booth MD

## 2018-05-01 ENCOUNTER — OFFICE VISIT (OUTPATIENT)
Dept: SURGERY | Age: 63
End: 2018-05-01

## 2018-05-01 ENCOUNTER — HOSPITAL ENCOUNTER (OUTPATIENT)
Dept: PREADMISSION TESTING | Age: 63
Discharge: HOME | End: 2018-05-01
Payer: COMMERCIAL

## 2018-05-01 VITALS
OXYGEN SATURATION: 98 % | DIASTOLIC BLOOD PRESSURE: 89 MMHG | WEIGHT: 181 LBS | HEIGHT: 64 IN | BODY MASS INDEX: 30.9 KG/M2 | RESPIRATION RATE: 16 BRPM | TEMPERATURE: 98.1 F | HEART RATE: 56 BPM | SYSTOLIC BLOOD PRESSURE: 163 MMHG

## 2018-05-01 VITALS
SYSTOLIC BLOOD PRESSURE: 163 MMHG | DIASTOLIC BLOOD PRESSURE: 89 MMHG | HEART RATE: 56 BPM | HEIGHT: 64 IN | TEMPERATURE: 98.1 F | WEIGHT: 181.13 LBS | BODY MASS INDEX: 30.92 KG/M2

## 2018-05-01 VITALS
BODY MASS INDEX: 30.9 KG/M2 | RESPIRATION RATE: 16 BRPM | TEMPERATURE: 98.1 F | SYSTOLIC BLOOD PRESSURE: 163 MMHG | WEIGHT: 181 LBS | HEART RATE: 56 BPM | HEIGHT: 64 IN | DIASTOLIC BLOOD PRESSURE: 89 MMHG | OXYGEN SATURATION: 98 %

## 2018-05-01 DIAGNOSIS — K91.2 POSTOPERATIVE INTESTINAL MALABSORPTION: ICD-10-CM

## 2018-05-01 DIAGNOSIS — I10 ESSENTIAL HYPERTENSION: ICD-10-CM

## 2018-05-01 DIAGNOSIS — K21.9 GASTROESOPHAGEAL REFLUX DISEASE, ESOPHAGITIS PRESENCE NOT SPECIFIED: Primary | ICD-10-CM

## 2018-05-01 DIAGNOSIS — K21.9 GASTROESOPHAGEAL REFLUX DISEASE WITHOUT ESOPHAGITIS: Primary | ICD-10-CM

## 2018-05-01 LAB
ATRIAL RATE: 58 BPM
CALCULATED P AXIS, ECG09: 22 DEGREES
CALCULATED R AXIS, ECG10: 16 DEGREES
CALCULATED T AXIS, ECG11: 44 DEGREES
DIAGNOSIS, 93000: NORMAL
ERYTHROCYTE [DISTWIDTH] IN BLOOD BY AUTOMATED COUNT: 13.9 % (ref 11.5–14.5)
HCT VFR BLD AUTO: 36.7 % (ref 35–47)
HGB BLD-MCNC: 11.1 G/DL (ref 11.5–16)
MCH RBC QN AUTO: 26.1 PG (ref 26–34)
MCHC RBC AUTO-ENTMCNC: 30.2 G/DL (ref 30–36.5)
MCV RBC AUTO: 86.2 FL (ref 80–99)
NRBC # BLD: 0 K/UL (ref 0–0.01)
NRBC BLD-RTO: 0 PER 100 WBC
P-R INTERVAL, ECG05: 120 MS
PLATELET # BLD AUTO: 317 K/UL (ref 150–400)
PMV BLD AUTO: 11.8 FL (ref 8.9–12.9)
Q-T INTERVAL, ECG07: 438 MS
QRS DURATION, ECG06: 74 MS
QTC CALCULATION (BEZET), ECG08: 429 MS
RBC # BLD AUTO: 4.26 M/UL (ref 3.8–5.2)
VENTRICULAR RATE, ECG03: 58 BPM
WBC # BLD AUTO: 7.1 K/UL (ref 3.6–11)

## 2018-05-01 PROCEDURE — 85027 COMPLETE CBC AUTOMATED: CPT | Performed by: NURSE PRACTITIONER

## 2018-05-01 PROCEDURE — 80307 DRUG TEST PRSMV CHEM ANLYZR: CPT | Performed by: NURSE PRACTITIONER

## 2018-05-01 PROCEDURE — 36415 COLL VENOUS BLD VENIPUNCTURE: CPT | Performed by: NURSE PRACTITIONER

## 2018-05-01 PROCEDURE — 93005 ELECTROCARDIOGRAM TRACING: CPT

## 2018-05-01 RX ORDER — ESOMEPRAZOLE MAGNESIUM 40 MG/1
40 CAPSULE, DELAYED RELEASE ORAL AS NEEDED
COMMUNITY
End: 2018-10-29 | Stop reason: ALTCHOICE

## 2018-05-01 NOTE — PROGRESS NOTES
1. Have you been to the ER, urgent care clinic since your last visit? Hospitalized since your last visit?  no    2. Have you seen or consulted any other health care providers outside of the [address] [place] [address] since your last visit? Include any pap smears or colon screening.    no

## 2018-05-01 NOTE — MR AVS SNAPSHOT
[address] [address] 
[phone] Patient: Pearl Pitts MRN: [id number] HBP:8/49/9345 Visit Information Date & Time Provider Department Dept. Phone Encounter #  
 5/1/2018 10:00 AM Jeramie Núñez NP [place] [address] [phone] [id number] Your Appointments 8/10/2018  9:30 AM  
ROUTINE CARE with Duane Corona MD  
[facility] [address] [place] [address]) Appt Note: 6 month f/u  
 [address] [place] [address]  
[id number] [address] [address] Upcoming Health Maintenance Date Due Pneumococcal 19-64 Medium Risk (1 of 1 - PPSV23) 6/15/1974 PAP AKA CERVICAL CYTOLOGY 4/1/2018 Influenza Age 5 to Adult 8/1/2018 BREAST CANCER SCRN MAMMOGRAM 12/4/2019 COLONOSCOPY 3/20/2023 DTaP/Tdap/Td series (2 - Td) 4/7/2025 Allergies as of 5/1/2018  Review Complete On: 5/1/2018 By: Manuela Riley LPN Severity Noted Reaction Type Reactions Codeine  03/23/2011    Hives Tolerates Dilaudid Current Immunizations  Reviewed on 5/2/2016 Name Date Influenza Vaccine 10/1/2015 Influenza Vaccine Split 11/9/2012 10:52 AM  
 Tdap 4/7/2015 Zoster Vaccine, Live 4/23/2016 Not reviewed this visit Vitals BP Pulse Temp Resp Height(growth percentile) Weight(growth percentile) 163/89 (!) 56 98.1 °F (36.7 °C) 16 5' 4\" (1.626 m) 181 lb (82.1 kg) SpO2 BMI OB Status Smoking Status 98% 31.07 kg/m2 Hysterectomy Former Smoker Vitals History BMI and BSA Data Body Mass Index Body Surface Area 31.07 kg/m 2 1.93 m 2 Preferred Pharmacy Pharmacy Name Phone [facility] [address], [address] [facility] Luana Walker AT [address] [phone] Your Updated Medication List  
  
   
 This list is accurate as of 5/1/18 10:34 AM.  Always use your most recent med list.  
  
  
  
  
 calcium carbonate 600 mg calcium (1,500 mg) tablet Commonly known as:  Randalyn Jesus Take 1 Tab by mouth daily. CARAFATE 1 gram tablet Generic drug:  sucralfate Take 1 g by mouth three (3) times daily. cholecalciferol 1,000 unit tablet Commonly known as:  VITAMIN D3 Take 1 Tab by mouth daily. esomeprazole 40 mg capsule Commonly known as:  Ruby [place] Take 40 mg by mouth as needed. MELATONIN PO Take 5 mg by mouth nightly as needed. psyllium 0.52 gram capsule Commonly known as:  METAMUCIL Take 1 Cap by mouth daily. STOOL SOFTENER PO Take 1 Tab by mouth daily. VITAMIN B-12 1,000 mcg tablet Generic drug:  cyanocobalamin Take 1,000 mcg by mouth daily. WOMEN'S MULTIVITAMIN 18 mg iron-400 mcg-500 mg Tab Generic drug:  mv-mn-iron-FA-Ca carb-vit K Take 1 Tab by mouth daily. To-Do List   
 07/03/2018 2:30 PM  
(Arrive by 2:15 PM) Appointment with Luz Clifford at [facility] ([phone]) Please, no calcium supplements or antacids that coat the stomach (ex: Tums, Mylanta) 24 hours prior to procedure. Maintain normal diet and medications. Dairy products are allowed. Wear an outfit with an elastic waistband (no zipper or metal snaps). Check in at registration 15min before your appointment time unless you were instructed to do otherwise. Please arrive 15 minutes prior to appointment to register. Patient Instructions Make your 2, 4 and 6 week appts for after surgery Plan on Bariatric Full Liquid diet after surgery Continue with the Nexium and Carafate Introducing [facility]! Dear Jeanenne Skiff: Thank you for requesting a Moment account. Our records indicate that you already have an active Moment account. You can access your account anytime at [email or web address] Did you know that you can access your hospital and ER discharge instructions at any time in Navmii? You can also review all of your test results from your hospital stay or ER visit. Additional Information If you have questions, please visit the Frequently Asked Questions section of the Navmii website at [email or web address] Remember, Navmii is NOT to be used for urgent needs. For medical emergencies, dial 911. Now available from your iPhone and Android! Please provide this summary of care documentation to your next provider. Your primary care clinician is listed as MARTINEZ HANCOCK. If you have any questions after today's visit, please call [phone].

## 2018-05-01 NOTE — PERIOP NOTES
PT/FAMILY PROVIDED AND REVIEWED PRE-OP INSTRUCTION SHEET. PATIENT GIVEN SURGICAL SITE INFORMATION FAQS INFORMATION HANDOUT. PT PROVIDED WITH GOOD HAND HYGIENE TIPS. PT GIVEN OPPORTUNITY TO ASK QUESTIONS.   PATIENT PROVIDED WITH CARLOS WIPES, ORAL AND WRITTEN INSTRUCTIONS

## 2018-05-01 NOTE — PROGRESS NOTES
HISTORY OF PRESENT ILLNESS  Carolyn Zabala is a 58 y.o. female. HPI   Chief Complaint   Patient presents with    Surg H&P     for revision of pouch/GJ with lap Nissen by dr Jared Diallo on 5/7/18     Patient Active Problem List   Diagnosis Code    Insomnia, unspecified G47.00    Pernicious anemia D51.0    Vitamin D deficiency E55.9    Reflux esophagitis K21.0    Gastric ulcer K25.9    Symptomatic menopausal or female climacteric states N95.1    Osteoporosis M81.0    Iron deficiency anemia, unspecified D50.9    Onychomycosis B35.1    Active advance directive on file Z78.9    Hypercalcemia E83.52    H/O hyperparathyroidism Z86.39    Broken toe S92.919A    B12 deficiency E53.8    Gastroesophageal reflux disease without esophagitis K21.9    Diverticulosis K57.90     Past Medical History:   Diagnosis Date    Arthritis     OSTEO    Broken toe 2017    right pinky toe    Chronic pain     LT. ARM    GERD (gastroesophageal reflux disease)     History of blood transfusion 1995    [place], NO REACTION; needed transfusion from surgery-ovarian cyst and hit artery per pt.     Infection 2012    left shoulder    Morbid obesity ([address] 3/23/2011    PUD (peptic ulcer disease)     Thyroid disease      Past Surgical History:   Procedure Laterality Date    BIOPSY/EXCISION, LYMPH NODE(S)      BREAST SURGERY PROCEDURE UNLISTED Left     LT. TUMOR BENIGN REMOVED AGE 18    COLONOSCOPY Left 3/20/2018    COLONOSCOPY performed by David Armas MD at [address] [place] [address] UNLISTED      HX ABDOMINOPLASTY  2006    HX ADENOIDECTOMY      HX CARPAL TUNNEL RELEASE  2001    RIGHT    HX CHOLECYSTECTOMY  2000    HX CHOLECYSTECTOMY  2000    at time of gastric bypass     HX COLONOSCOPY      2014, due 17 vs 19    HX GASTRIC BYPASS  12-13-00    dr. Kaushik Thomas HX GI  2016    endoscopy, colonoscopy    HX HEENT  1995    TUMOR REMOVED neck (benign)    HX HEENT  05/2017 PARATHYROID EXCISION    HX HYSTERECTOMY  1993    HX ORTHOPAEDIC      CYCST REMOVED BASE OF SPINE AGE 23    HX ORTHOPAEDIC Right [id number]    RIGHT ANKLE FX    HX ORTHOPAEDIC Left 4/2012    LT.  SHOULDER AND HUMERUS FX, SCREW AND PLATE ( has been removed)    HX ORTHOPAEDIC Right 2014    shoulder fx, orif    HX ORTHOPAEDIC Right     CARPAL TUNNEL    HX OTHER SURGICAL      TUMOR REMOVED RIGHT UPPER FLANK AREA    HX TONSIL AND ADENOIDECTOMY      AGE5    HX TONSILLECTOMY      HX TUBAL LIGATION  1981    LEG/ANKLE SURGERY PROC UNLISTED      right ankle cartilage    LEG/ANKLE SURGERY PROC UNLISTED  2009     ankle     Social History     Social History    Marital status:      Spouse name: N/A    Number of children: 1    Years of education: N/A     Occupational History    computer       Social History Main Topics    Smoking status: Former Smoker     Packs/day: 0.50     Years: 7.00     Types: Cigarettes     Quit date: 3/18/2018    Smokeless tobacco: Never Used    Alcohol use 2.4 oz/week     1 Glasses of wine, 3 Cans of beer per week      Comment: 6 PACK/every 2 weeks    Drug use: No    Sexual activity: Not on file     Other Topics Concern    Not on file     Social History Narrative    In the home with sister/spouse     Adult son     Family History   Problem Relation Age of Onset    Other Mother      hypoglycemia, obese,[place] corey syndrome    Cancer Mother      LUNG    Lung Disease Mother     Obesity Mother     Cancer Father      GENERALIZED    Other Father      Syliva Philip corey syndrome gene trait    Asthma Sister     Lung Disease Sister     Hypertension Brother     Lung Disease Brother     Diabetes Brother     Thyroid Disease Brother     Other Brother      [place] corey syndrome    Cancer Brother      COLON    Heart Disease Maternal Aunt     Cancer Maternal Aunt     Cancer Maternal Uncle      UNKNOWN    Cancer Paternal Uncle     Heart Disease Maternal Grandmother     Heart Disease Maternal Grandfather     Heart Disease Paternal Grandmother     Heart Disease Paternal Grandfather     Cancer Paternal Uncle     Lung Disease Brother     Cancer Maternal Uncle     Cancer Paternal Aunt      BREAST    Anesth Problems Neg Hx        Current Outpatient Prescriptions:     mv-mn-iron-FA-Ca carb-vit K (WOMEN'S MULTIVITAMIN) 18 mg iron-400 mcg-500 mg tab, Take 1 Tab by mouth daily. , Disp: , Rfl:     esomeprazole (NEXIUM) 40 mg capsule, Take 40 mg by mouth as needed. , Disp: , Rfl:     sucralfate (CARAFATE) 1 gram tablet, Take 1 g by mouth three (3) times daily. , Disp: , Rfl:     DOCUSATE SODIUM (STOOL SOFTENER PO), Take 1 Tab by mouth daily. , Disp: , Rfl:     cholecalciferol (VITAMIN D3) 1,000 unit tablet, Take 1 Tab by mouth daily. , Disp: 90 Tab, Rfl: 3    calcium carbonate (CALTREX) 600 mg calcium (1,500 mg) tablet, Take 1 Tab by mouth daily. , Disp: 90 Tab, Rfl: 3    psyllium (METAMUCIL) 0.52 gram capsule, Take 1 Cap by mouth daily. , Disp: 90 Cap, Rfl: 3    MELATONIN PO, Take 5 mg by mouth nightly as needed. , Disp: , Rfl:     cyanocobalamin (VITAMIN B-12) 1,000 mcg tablet, Take 1,000 mcg by mouth daily. , Disp: , Rfl:   Allergies   Allergen Reactions    Codeine Hives     Tolerates Dilaudid     PCP Vee Morales MD    Review of Systems   Constitutional: Negative for chills, fever, malaise/fatigue and weight loss. HENT: Positive for congestion (seasonal ). Negative for ear discharge, ear pain, hearing loss, nosebleeds, sinus pain, sore throat and tinnitus. Eyes: Negative for blurred vision. Glasses     Respiratory: Negative for cough (denies post prandial and night cough ), sputum production, shortness of breath, wheezing and stridor. Cardiovascular: Positive for leg swelling (ankles R.L). Negative for chest pain and palpitations.    Gastrointestinal: Positive for abdominal pain (epigastric ), constipation (stool softener and fiber helps ), heartburn (terrible ), nausea and vomiting (no hematemesis ). Negative for blood in stool (not since March ) and melena. Has been using boost and ensure to get more protein     Past few months increased food intolerance, especially meats   + bile reflux, rarely food   Sleeps elevated    Genitourinary: Positive for urgency. Musculoskeletal: Positive for joint pain (some mild joint pain ). Negative for back pain, falls, myalgias and neck pain. Skin: Negative. Neurological: Negative for dizziness, loss of consciousness and headaches. Endo/Heme/Allergies:        Hx transfusion   Anemia recently   - PICA    Psychiatric/Behavioral: The patient does not have insomnia. Sleep is fair        Physical Exam   Constitutional: She is oriented to person, place, and time. No distress. /89  Pulse (!) 56  Temp 98.1 °F (36.7 °C)  Resp 16  Ht 5' 4\" (1.626 m)  Wt 181 lb (82.1 kg)  SpO2 98%  BMI 31.07 kg/m2  White female, unaccompanied   Well groomed      HENT:   Head: Normocephalic. Mouth/Throat: No oropharyngeal exudate. Eyes: Pupils are equal, round, and reactive to light. No scleral icterus. Neck: Normal range of motion. Neck supple. No JVD present. No tracheal deviation present. No thyromegaly present. Cardiovascular: Normal rate and regular rhythm. Pulmonary/Chest: Effort normal and breath sounds normal.   Abdominal: Soft. Bowel sounds are normal. She exhibits mass (some fullness along the midline periumbilical region, no definite hernia defect appreciated ). She exhibits no distension. There is no tenderness. There is no rebound and no guarding. Musculoskeletal: She exhibits no edema. Ambulating independently    Lymphadenopathy:     She has no cervical adenopathy. Neurological: She is alert and oriented to person, place, and time. Skin: Skin is warm and dry. She is not diaphoretic. Psychiatric: She has a normal mood and affect.      1/22/18 UGI   EXAM:  XR UPPER GI SERIES W KUB   INDICATION:   h/o danii en y gastric bypass, epigastric pain and vomiting,  ?stricture, ? reflux   COMPARISON: None.   FLUOROSCOPY DOSE (PKA, DAP): 57 Gycm2.   FINDINGS:  The preliminary radiograph of the abdomen demonstrates a nonobstructive gas  pattern. There are surgical clips in the abdomen   A a single-contrast examination was performed. The patient ingested barium  without difficulty.    Patient is status post gastric bypass.   Esophageal motility and caliber are normal.   Patient status post gastric bypass. The pouch measures 7 cm x 7.5 cm 8 cm. There  is no leak or obstruction.  Oleta Sand is a moderate amount of reflux into the proximal esophagus.   IMPRESSION  IMPRESSION:  Patient is status post gastric bypass which measures 7 x 7.5 x 8  cm. There is no leak or obstruction. There is a moderate amount of reflux into  the proximal esophagus. .       3/7/18 EGD    3/20/18 Colonoscopy  Indications:   Lower GI bleeding - painless, s/p gastric bypass  Referring Physician: Yvonne Nelson MD   Anesthesia/Sedation:MAC  Endoscopist:  Dr. Olivia Rosas  Assistant:  Endoscopy Technician-1: Melia Vergara  Endoscopy RN-1: Parmjit No     Preoperative diagnosis: Colon     Postoperative diagnosis: 1. Diverticulosis  2. Sigmoid Colon Polyp  3. Rectal Colon Polyp     4/12/18 Manometry   PREPROCEDURE DIAGNOSES:  1. Gastroesophageal reflux disease. 2.  Surgery is under consideration.     PROCEDURES PLANNED AND PERFORMED:  1. High resolution esophageal manometry. 2.  Impedance esophageal manometry.     POSTPROCEDURE DIAGNOSES:  1. Normal relaxation of esophagogastric junction. 2.  Weak peristalsis with small defects. PREPROCEDURE DIAGNOSES:  1. Gastroesophageal reflux disease. 2.  Surgery is under consideration.       ASSESSMENT and PLAN    ICD-10-CM ICD-9-CM    1. Gastroesophageal reflux disease, esophagitis presence not specified K21.9 530.81    2. Postoperative intestinal malabsorption K91.2 579.3    3.  BMI 31.0-31.9,adult Z68.31 V85.31 4. Essential hypertension I10 401.9      Scheduled for revision  laparoscopic partial pouch resection to decrease parietal cell mass and creation of new gastrojejunostomy of the appropriate size; partial vs full fundoplication using excluded stomach on 5/7/18 with Dr. Verena Galvez labs pending   Advised to continue acid suppression and carafate  Continue smoking cessation   Avoid alcohol   Reviewed post operative diet, restrictions, follow up and medications  Post operative 2, 4 and 6 week appointments made  Reviewed educational materials and book  Questions answered   Javi Baig verbalized understanding and questions were answered to the best of my knowledge and ability. Surgery  educational materials were provided.       31 minutes spent in face to face with patient

## 2018-05-01 NOTE — PATIENT INSTRUCTIONS
Make your 2, 4 and 6 week appts for after surgery     Plan on Bariatric Full Liquid diet after surgery     Continue with the Nexium and Carafate

## 2018-05-02 LAB
COTININE UR QL SCN: NEGATIVE NG/ML
DRUG SCREEN COMMENT:, 753798: NORMAL

## 2018-05-02 NOTE — PROGRESS NOTES
Subjective: The patient is a 58 y.o. obese female scheduled for laparoscopic revision of open gastric bypass +/- fundoplication on 5/7. Body mass index is 31.07 kg/(m^2). Harrison Gustafson has developed severe GERD, regurgitation following open gastric bypass (2000). Endoscopy with irregular Z-line; UGI series with enlarged pouch/G-J/spontaneous reflux; normal esophageal manometry. Bariatric comorbidities present are   Past Medical History:   Diagnosis Date    Arthritis     OSTEO    Broken toe 2017    right pinky toe    Chronic pain     LT. ARM    GERD (gastroesophageal reflux disease)     History of blood transfusion 1995    LOLY, NO REACTION; needed transfusion from surgery-ovarian cyst and hit artery per pt.  Infection 2012    left shoulder    Morbid obesity ([facility] [address] 3/23/2011    PUD (peptic ulcer disease)     Thyroid disease        Patient Active Problem List    Diagnosis Date Noted    Diverticulosis 03/20/2018    Gastroesophageal reflux disease without esophagitis 02/07/2018    B12 deficiency 11/21/2017    Broken toe     H/O hyperparathyroidism 06/20/2017    Hypercalcemia 02/21/2017    Active advance directive on file 04/18/2016    Onychomycosis 07/23/2014    Osteoporosis 05/13/2014    Iron deficiency anemia, unspecified 05/13/2014    Insomnia, unspecified 04/23/2014    Pernicious anemia 04/23/2014    Vitamin D deficiency 04/23/2014    Reflux esophagitis 04/23/2014    Gastric ulcer 04/23/2014    Symptomatic menopausal or female climacteric states 04/23/2014     Past Medical History:   Diagnosis Date    Arthritis     OSTEO    Broken toe 2017    right pinky toe    Chronic pain     LT. ARM    GERD (gastroesophageal reflux disease)     History of blood transfusion 1995    LOLY, NO REACTION; needed transfusion from surgery-ovarian cyst and hit artery per pt.     Infection 2012    left shoulder    Morbid obesity ([address] 3/23/2011    PUD (peptic ulcer disease)     Thyroid disease       Past Surgical History:   Procedure Laterality Date    BIOPSY/EXCISION, LYMPH NODE(S)      BREAST SURGERY PROCEDURE UNLISTED Left     LT. TUMOR BENIGN REMOVED AGE 18    COLONOSCOPY Left 3/20/2018    COLONOSCOPY performed by Yasmin Tuttle MD at [address] [place] [address] UNLISTED      HX ABDOMINOPLASTY  2006    HX ADENOIDECTOMY      HX CARPAL TUNNEL RELEASE  2001    RIGHT    HX CHOLECYSTECTOMY  2000    HX CHOLECYSTECTOMY  2000    at time of gastric bypass     HX COLONOSCOPY      2014, due 17 vs 19    HX GASTRIC BYPASS  12-13-00    dr. Uma Yates HX GI  2016    endoscopy, colonoscopy    HX HEENT  1995    TUMOR REMOVED neck (benign)    HX HEENT  05/2017    PARATHYROID EXCISION    HX HYSTERECTOMY  1993    HX ORTHOPAEDIC      CYCST REMOVED BASE OF SPINE AGE 23    HX ORTHOPAEDIC Right 2004,2009    RIGHT ANKLE FX    HX ORTHOPAEDIC Left 4/2012    LT.  SHOULDER AND HUMERUS FX, SCREW AND PLATE ( has been removed)    HX ORTHOPAEDIC Right 2014    shoulder fx, orif    HX ORTHOPAEDIC Right     CARPAL TUNNEL    HX OTHER SURGICAL      TUMOR REMOVED RIGHT UPPER FLANK AREA    HX TONSIL AND ADENOIDECTOMY      AGE5    HX TONSILLECTOMY      HX TUBAL LIGATION  1981    LEG/ANKLE SURGERY PROC UNLISTED      right ankle cartilage    LEG/ANKLE SURGERY PROC UNLISTED  2009     ankle      Social History   Substance Use Topics    Smoking status: Former Smoker     Packs/day: 0.50     Years: 7.00     Types: Cigarettes     Quit date: 3/18/2018    Smokeless tobacco: Never Used    Alcohol use 2.4 oz/week     1 Glasses of wine, 3 Cans of beer per week      Comment: 6 PACK/every 2 weeks      Family History   Problem Relation Age of Onset    Other Mother      hypoglycemia, obese,Iza corey syndrome    Cancer Mother      LUNG    Lung Disease Mother     Obesity Mother     Cancer Father      GENERALIZED    Other Father      Miriam Coad corey syndrome gene trait  Asthma Sister     Lung Disease Sister     Hypertension Brother     Lung Disease Brother     Diabetes Brother     Thyroid Disease Brother     Other Brother      Iza corey syndrome    Cancer Brother      COLON    Heart Disease Maternal Aunt     Cancer Maternal Aunt     Cancer Maternal Uncle      UNKNOWN    Cancer Paternal Uncle     Heart Disease Maternal Grandmother     Heart Disease Maternal Grandfather     Heart Disease Paternal Grandmother     Heart Disease Paternal Grandfather     Cancer Paternal Uncle     Lung Disease Brother     Cancer Maternal Uncle     Cancer Paternal Aunt      BREAST    Anesth Problems Neg Hx       Prior to Admission medications    Medication Sig Start Date End Date Taking? Authorizing Provider   mv-mn-iron-FA-Ca carb-vit K [facility] MULTIVITAMIN) 18 mg iron-400 mcg-500 mg tab Take 1 Tab by mouth daily. Yes Historical Provider   esomeprazole (NEXIUM) 40 mg capsule Take 40 mg by mouth as needed. Yes Historical Provider   sucralfate (CARAFATE) 1 gram tablet Take 1 g by mouth three (3) times daily. Yes Historical Provider   DOCUSATE SODIUM (STOOL SOFTENER PO) Take 1 Tab by mouth daily. Yes Historical Provider   cholecalciferol (VITAMIN D3) 1,000 unit tablet Take 1 Tab by mouth daily. 12/15/17  Yes Mechelle Singh MD   calcium carbonate (CALTREX) 600 mg calcium (1,500 mg) tablet Take 1 Tab by mouth daily. 12/15/17  Yes Mechelle Singh MD   psyllium (METAMUCIL) 0.52 gram capsule Take 1 Cap by mouth daily. 12/15/17  Yes Mechelle Singh MD   MELATONIN PO Take 5 mg by mouth nightly as needed. Yes Historical Provider   cyanocobalamin (VITAMIN B-12) 1,000 mcg tablet Take 1,000 mcg by mouth daily.    Yes Historical Provider     Allergies   Allergen Reactions    Codeine Hives     Tolerates Dilaudid         Objective:     Visit Vitals    /89    Pulse (!) 56    Temp 98.1 °F (36.7 °C)    Resp 16    Ht 5' 4\" (1.626 m)    Wt 181 lb (82.1 kg)    SpO2 98%    BMI 31.07 kg/m2       Lab Review:    Recent Results (from the past 24 hour(s))   EKG, 12 LEAD, INITIAL    Collection Time: 05/01/18  7:37 AM   Result Value Ref Range    Ventricular Rate 58 BPM    Atrial Rate 58 BPM    P-R Interval 120 ms    QRS Duration 74 ms    Q-T Interval 438 ms    QTC Calculation (Bezet) 429 ms    Calculated P Axis 22 degrees    Calculated R Axis 16 degrees    Calculated T Axis 44 degrees    Diagnosis       Sinus bradycardia  Otherwise normal ECG  When compared with ECG of 08-MAY-2017 14:35,  No significant change was found  Confirmed by Esperanza Vital M.D., Qian Mariscal ([id number]) on 5/1/2018 11:54:57 AM     CBC W/O DIFF    Collection Time: 05/01/18  9:15 AM   Result Value Ref Range    WBC 7.1 3.6 - 11.0 K/uL    RBC 4.26 3.80 - 5.20 M/uL    HGB 11.1 (L) 11.5 - 16.0 g/dL    HCT 36.7 35.0 - 47.0 %    MCV 86.2 80.0 - 99.0 FL    MCH 26.1 26.0 - 34.0 PG    MCHC 30.2 30.0 - 36.5 g/dL    RDW 13.9 11.5 - 14.5 %    PLATELET 823 442 - 415 K/uL    MPV 11.8 8.9 - 12.9 FL    NRBC 0.0 0  WBC    ABSOLUTE NRBC 0.00 0.00 - 0.01 K/uL       Assessment:     Severe GERD, regurgitation following open gastric bypass. Plan:     Laparoscopic revision of gastrojejunostomy, possible fundoplication with upper endoscopy    All of her questions have been answered. She understands revisional bariatric surgery is associated with 3-5x higher risk than primary procedure (risks to include bleeding, infection, conversion to open procedure, staple line leak, injury to surrounding structures, recurrent/persistent symptoms, dysphagia). She desires to proceed. 30 minutes spent with patient (greater than 50% of time in face-face consultation reviewing technical aspects of procedure, risks, anticipated hospital course, post-op diet, activity restriction).       Signed By: Malinda Sandoval MD     May 2, 2018

## 2018-05-02 NOTE — PATIENT INSTRUCTIONS
Learning About Bariatric Surgery  What is bariatric surgery? Bariatric surgery is surgery to help you lose weight. This type of surgery is only used for people who are very overweight and have not been able to lose weight with diet and exercise. This surgery makes the stomach smaller. Some types of surgery also change the connection between your stomach and intestines. How is bariatric surgery done? Bariatric surgery may be either \"open\" or \"laparoscopic. \" Open surgery is done through a large cut (incision) in the belly. Laparoscopic surgery is done through several small cuts. The doctor puts a lighted tube, or scope, and other surgical tools through small cuts in your belly. The doctor is able to see your organs with the scope. There are different types of bariatric surgery. Gastric sleeve surgery  The surgery is usually done through several small incisions in the belly. The doctor removes more than half of your stomach. This leaves a thin sleeve, or tube, that is about the size of a banana. Because part of your stomach has been removed, this can't be reversed. Polo-en-Y gastric bypass surgery  Polo-en-Y (say \"ilsa-en-why\") surgery changes the connection between the stomach and the intestines. The doctor separates a section of your stomach from the rest of your stomach. This makes a small pouch. The new pouch will hold the food you eat. The doctor connects the stomach pouch to the middle part of the small intestine. Gastric banding surgery  The surgery is usually done through several small incisions in the belly. The doctor wraps a band around the upper part of the stomach. This creates a small pouch. The small size of the pouch means that you will get full after you eat just a small amount of food. The doctor can inflate or deflate the band to adjust the size. This lets the doctor adjust how quickly food passes from the new pouch into the stomach.  It does not change the connection between the stomach and the intestines. What can you expect after the surgery? You may stay in the hospital for one or more days after the surgery. How long you stay depends on the type of surgery you had. Most people need 2 to 4 weeks before they are ready to get back to their usual routine. For the first 2 to 6 weeks after surgery, you probably will need to follow a liquid or soft diet. Bit by bit, you will be able to eat more solid foods. Your doctor may advise you to work with a dietitian. This way you'll be sure to get enough protein, vitamins, and minerals while you are losing weight. Even with a healthy diet, you may need to take vitamin and mineral supplements. After surgery, you will not be able to eat very much at one time. You will get full quickly. Try not to eat too much at one time or eat foods that are high in fat or sugar. If you do, you may vomit, get stomach pain, or have diarrhea. You probably will lose weight very quickly in the first few months after surgery. As time goes on, your weight loss will slow down. You will have regular doctor visits to check how you are doing. Think of bariatric surgery as a tool to help you lose weight. It isn't an instant fix. You will still need to eat a healthy diet and get regular exercise. This will help you reach your weight goal and avoid regaining the weight you lose. Follow-up care is a key part of your treatment and safety. Be sure to make and go to all appointments, and call your doctor if you are having problems. It's also a good idea to know your test results and keep a list of the medicines you take. Where can you learn more? Go to http://melody-kate.info/. Enter G469 in the search box to learn more about \"Learning About Bariatric Surgery. \"  Current as of: October 13, 2016  Content Version: 11.4  © 3404-4294 [employer].  Care instructions adapted under license by [employer] (which disclaims liability or warranty for this information). If you have questions about a medical condition or this instruction, always ask your healthcare professional. [place] [address] any warranty or liability for your use of this information.

## 2018-05-04 ENCOUNTER — ANESTHESIA EVENT (OUTPATIENT)
Dept: SURGERY | Age: 63
DRG: 327 | End: 2018-05-04
Payer: COMMERCIAL

## 2018-05-07 ENCOUNTER — HOSPITAL ENCOUNTER (INPATIENT)
Age: 63
LOS: 2 days | Discharge: HOME | DRG: 327 | End: 2018-05-09
Attending: SURGERY | Admitting: SURGERY
Payer: COMMERCIAL

## 2018-05-07 ENCOUNTER — ANESTHESIA (OUTPATIENT)
Dept: SURGERY | Age: 63
DRG: 327 | End: 2018-05-07
Payer: COMMERCIAL

## 2018-05-07 DIAGNOSIS — Z98.84 S/P GASTRIC BYPASS: Primary | ICD-10-CM

## 2018-05-07 PROCEDURE — 77030002916 HC SUT ETHLN J&J -A: Performed by: SURGERY

## 2018-05-07 PROCEDURE — 74011250636 HC RX REV CODE- 250/636: Performed by: ANESTHESIOLOGY

## 2018-05-07 PROCEDURE — 77030037366 HC STPLR ENDO TRI-STPLR COVD -C: Performed by: SURGERY

## 2018-05-07 PROCEDURE — 88307 TISSUE EXAM BY PATHOLOGIST: CPT | Performed by: SURGERY

## 2018-05-07 PROCEDURE — 74011250636 HC RX REV CODE- 250/636

## 2018-05-07 PROCEDURE — 77030018846 HC SOL IRR STRL H20 ICUM -A: Performed by: SURGERY

## 2018-05-07 PROCEDURE — 77030020263 HC SOL INJ SOD CL0.9% LFCR 1000ML: Performed by: SURGERY

## 2018-05-07 PROCEDURE — 77030037032 HC INSRT SCIS CLICKLLINE DISP STOR -B: Performed by: SURGERY

## 2018-05-07 PROCEDURE — 77030032490 HC SLV COMPR SCD KNE COVD -B: Performed by: SURGERY

## 2018-05-07 PROCEDURE — 77030020782 HC GWN BAIR PAWS FLX 3M -B

## 2018-05-07 PROCEDURE — 65660000000 HC RM CCU STEPDOWN

## 2018-05-07 PROCEDURE — 77030039266 HC ADH SKN EXOFIN S2SG -A: Performed by: SURGERY

## 2018-05-07 PROCEDURE — 77030008684 HC TU ET CUF COVD -B: Performed by: ANESTHESIOLOGY

## 2018-05-07 PROCEDURE — 77030009957 HC RELD ENDOSTCH COVD -C: Performed by: SURGERY

## 2018-05-07 PROCEDURE — 0DJ08ZZ INSPECTION OF UPPER INTESTINAL TRACT, VIA NATURAL OR ARTIFICIAL OPENING ENDOSCOPIC: ICD-10-PCS | Performed by: SURGERY

## 2018-05-07 PROCEDURE — 77030037367 HC STPLR ENDO TRI-STPLR COVD -D: Performed by: SURGERY

## 2018-05-07 PROCEDURE — 77030031139 HC SUT VCRL2 J&J -A: Performed by: SURGERY

## 2018-05-07 PROCEDURE — 76010000174 HC OR TIME 3.5 TO 4 HR INTENSV-TIER 1: Performed by: SURGERY

## 2018-05-07 PROCEDURE — 77030013567 HC DRN WND RESERV BARD -A: Performed by: SURGERY

## 2018-05-07 PROCEDURE — 77030012407 HC DRN WND BARD -B: Performed by: SURGERY

## 2018-05-07 PROCEDURE — 77030020747 HC TU INSUF ENDOSC TELE -A: Performed by: SURGERY

## 2018-05-07 PROCEDURE — 77030030826 HC RETRCTR WND ALEXS AMR -B: Performed by: SURGERY

## 2018-05-07 PROCEDURE — 74011250636 HC RX REV CODE- 250/636: Performed by: SURGERY

## 2018-05-07 PROCEDURE — 74011000250 HC RX REV CODE- 250: Performed by: ANESTHESIOLOGY

## 2018-05-07 PROCEDURE — 77030009965 HC RELD STPLR ENDOS COVD -D: Performed by: SURGERY

## 2018-05-07 PROCEDURE — 77030008756 HC TU IRR SUC STRY -B: Performed by: SURGERY

## 2018-05-07 PROCEDURE — 74011000250 HC RX REV CODE- 250

## 2018-05-07 PROCEDURE — 77030038157 HC DEV PWR CNTR DISP SIGNIA COVD -C: Performed by: SURGERY

## 2018-05-07 PROCEDURE — 0D164ZA BYPASS STOMACH TO JEJUNUM, PERCUTANEOUS ENDOSCOPIC APPROACH: ICD-10-PCS | Performed by: SURGERY

## 2018-05-07 PROCEDURE — 77030002895 HC DEV VASC CLOSR COVD -B: Performed by: SURGERY

## 2018-05-07 PROCEDURE — 77030019908 HC STETH ESOPH SIMS -A: Performed by: ANESTHESIOLOGY

## 2018-05-07 PROCEDURE — 77030016151 HC PROTCTR LNS DFOG COVD -B: Performed by: SURGERY

## 2018-05-07 PROCEDURE — 77030009852 HC PCH RTVR ENDOSC COVD -B: Performed by: SURGERY

## 2018-05-07 PROCEDURE — 77030018836 HC SOL IRR NACL ICUM -A: Performed by: SURGERY

## 2018-05-07 PROCEDURE — 76210000016 HC OR PH I REC 1 TO 1.5 HR: Performed by: SURGERY

## 2018-05-07 PROCEDURE — 74011250637 HC RX REV CODE- 250/637

## 2018-05-07 PROCEDURE — 74011000250 HC RX REV CODE- 250: Performed by: SURGERY

## 2018-05-07 PROCEDURE — 77030011640 HC PAD GRND REM COVD -A: Performed by: SURGERY

## 2018-05-07 PROCEDURE — 77030035042 HC TRCR ENDOSC OPTCL BLDLSS COVD -D: Performed by: SURGERY

## 2018-05-07 PROCEDURE — 77030013079 HC BLNKT BAIR HGGR 3M -A: Performed by: ANESTHESIOLOGY

## 2018-05-07 PROCEDURE — 77030026438 HC STYL ET INTUB CARD -A: Performed by: ANESTHESIOLOGY

## 2018-05-07 PROCEDURE — 77030034850: Performed by: SURGERY

## 2018-05-07 PROCEDURE — 0BQT4ZZ REPAIR DIAPHRAGM, PERCUTANEOUS ENDOSCOPIC APPROACH: ICD-10-PCS | Performed by: SURGERY

## 2018-05-07 PROCEDURE — 77030020053 HC ELECTRD LAPSCP COVD -B: Performed by: SURGERY

## 2018-05-07 PROCEDURE — 77030008437 HC REINF STRP REINF SEMGD WLGO -C: Performed by: SURGERY

## 2018-05-07 PROCEDURE — 77030035051: Performed by: SURGERY

## 2018-05-07 PROCEDURE — 88304 TISSUE EXAM BY PATHOLOGIST: CPT | Performed by: SURGERY

## 2018-05-07 PROCEDURE — 77030002933 HC SUT MCRYL J&J -A: Performed by: SURGERY

## 2018-05-07 PROCEDURE — 77030010286 HC STPLR ENDOSC COVD -D: Performed by: SURGERY

## 2018-05-07 PROCEDURE — 77030035045 HC TRCR ENDOSC VRSPRT BLDLSS COVD -B: Performed by: SURGERY

## 2018-05-07 PROCEDURE — 76060000038 HC ANESTHESIA 3.5 TO 4 HR: Performed by: SURGERY

## 2018-05-07 PROCEDURE — P9045 ALBUMIN (HUMAN), 5%, 250 ML: HCPCS

## 2018-05-07 PROCEDURE — 77030034154 HC SHR COAG HARM ACE J&J -F: Performed by: SURGERY

## 2018-05-07 RX ORDER — PROPOFOL 10 MG/ML
INJECTION, EMULSION INTRAVENOUS AS NEEDED
Status: DISCONTINUED | OUTPATIENT
Start: 2018-05-07 | End: 2018-05-07 | Stop reason: HOSPADM

## 2018-05-07 RX ORDER — MIDAZOLAM HYDROCHLORIDE 1 MG/ML
INJECTION, SOLUTION INTRAMUSCULAR; INTRAVENOUS AS NEEDED
Status: DISCONTINUED | OUTPATIENT
Start: 2018-05-07 | End: 2018-05-07 | Stop reason: HOSPADM

## 2018-05-07 RX ORDER — SODIUM CHLORIDE 0.9 % (FLUSH) 0.9 %
5-10 SYRINGE (ML) INJECTION AS NEEDED
Status: DISCONTINUED | OUTPATIENT
Start: 2018-05-07 | End: 2018-05-09 | Stop reason: HOSPADM

## 2018-05-07 RX ORDER — HYDROMORPHONE HYDROCHLORIDE 1 MG/ML
0.2 INJECTION, SOLUTION INTRAMUSCULAR; INTRAVENOUS; SUBCUTANEOUS
Status: DISCONTINUED | OUTPATIENT
Start: 2018-05-07 | End: 2018-05-07 | Stop reason: HOSPADM

## 2018-05-07 RX ORDER — GLYCOPYRROLATE 0.2 MG/ML
INJECTION INTRAMUSCULAR; INTRAVENOUS AS NEEDED
Status: DISCONTINUED | OUTPATIENT
Start: 2018-05-07 | End: 2018-05-07 | Stop reason: HOSPADM

## 2018-05-07 RX ORDER — BUPIVACAINE HYDROCHLORIDE 5 MG/ML
50 INJECTION, SOLUTION EPIDURAL; INTRACAUDAL ONCE
Status: COMPLETED | OUTPATIENT
Start: 2018-05-07 | End: 2018-05-07

## 2018-05-07 RX ORDER — MIDAZOLAM HYDROCHLORIDE 1 MG/ML
0.5 INJECTION, SOLUTION INTRAMUSCULAR; INTRAVENOUS
Status: DISCONTINUED | OUTPATIENT
Start: 2018-05-07 | End: 2018-05-07 | Stop reason: HOSPADM

## 2018-05-07 RX ORDER — NALOXONE HYDROCHLORIDE 0.4 MG/ML
0.4 INJECTION, SOLUTION INTRAMUSCULAR; INTRAVENOUS; SUBCUTANEOUS AS NEEDED
Status: DISCONTINUED | OUTPATIENT
Start: 2018-05-07 | End: 2018-05-09 | Stop reason: HOSPADM

## 2018-05-07 RX ORDER — SODIUM CHLORIDE 9 MG/ML
25 INJECTION, SOLUTION INTRAVENOUS CONTINUOUS
Status: DISCONTINUED | OUTPATIENT
Start: 2018-05-07 | End: 2018-05-07 | Stop reason: HOSPADM

## 2018-05-07 RX ORDER — ONDANSETRON 2 MG/ML
INJECTION INTRAMUSCULAR; INTRAVENOUS AS NEEDED
Status: DISCONTINUED | OUTPATIENT
Start: 2018-05-07 | End: 2018-05-07 | Stop reason: HOSPADM

## 2018-05-07 RX ORDER — EPHEDRINE SULFATE 50 MG/ML
5 INJECTION, SOLUTION INTRAVENOUS AS NEEDED
Status: DISCONTINUED | OUTPATIENT
Start: 2018-05-07 | End: 2018-05-07 | Stop reason: HOSPADM

## 2018-05-07 RX ORDER — ROPIVACAINE HYDROCHLORIDE 5 MG/ML
30 INJECTION, SOLUTION EPIDURAL; INFILTRATION; PERINEURAL AS NEEDED
Status: DISCONTINUED | OUTPATIENT
Start: 2018-05-07 | End: 2018-05-07 | Stop reason: HOSPADM

## 2018-05-07 RX ORDER — PROCHLORPERAZINE EDISYLATE 5 MG/ML
INJECTION INTRAMUSCULAR; INTRAVENOUS
Status: DISPENSED
Start: 2018-05-07 | End: 2018-05-08

## 2018-05-07 RX ORDER — DIPHENHYDRAMINE HYDROCHLORIDE 50 MG/ML
25 INJECTION, SOLUTION INTRAMUSCULAR; INTRAVENOUS
Status: ACTIVE | OUTPATIENT
Start: 2018-05-07 | End: 2018-05-08

## 2018-05-07 RX ORDER — GLYCOPYRROLATE 0.2 MG/ML
0.2 INJECTION INTRAMUSCULAR; INTRAVENOUS
Status: DISCONTINUED | OUTPATIENT
Start: 2018-05-07 | End: 2018-05-07 | Stop reason: HOSPADM

## 2018-05-07 RX ORDER — LIDOCAINE HYDROCHLORIDE 20 MG/ML
INJECTION, SOLUTION EPIDURAL; INFILTRATION; INTRACAUDAL; PERINEURAL AS NEEDED
Status: DISCONTINUED | OUTPATIENT
Start: 2018-05-07 | End: 2018-05-07 | Stop reason: HOSPADM

## 2018-05-07 RX ORDER — HYDROCODONE BITARTRATE AND ACETAMINOPHEN 5; 325 MG/1; MG/1
1 TABLET ORAL AS NEEDED
Status: DISCONTINUED | OUTPATIENT
Start: 2018-05-07 | End: 2018-05-07 | Stop reason: HOSPADM

## 2018-05-07 RX ORDER — SODIUM CHLORIDE, SODIUM LACTATE, POTASSIUM CHLORIDE, CALCIUM CHLORIDE 600; 310; 30; 20 MG/100ML; MG/100ML; MG/100ML; MG/100ML
125 INJECTION, SOLUTION INTRAVENOUS CONTINUOUS
Status: DISPENSED | OUTPATIENT
Start: 2018-05-07 | End: 2018-05-08

## 2018-05-07 RX ORDER — LIDOCAINE HYDROCHLORIDE 10 MG/ML
0.1 INJECTION, SOLUTION EPIDURAL; INFILTRATION; INTRACAUDAL; PERINEURAL AS NEEDED
Status: DISCONTINUED | OUTPATIENT
Start: 2018-05-07 | End: 2018-05-07 | Stop reason: HOSPADM

## 2018-05-07 RX ORDER — HYDROMORPHONE HYDROCHLORIDE 2 MG/ML
INJECTION, SOLUTION INTRAMUSCULAR; INTRAVENOUS; SUBCUTANEOUS AS NEEDED
Status: DISCONTINUED | OUTPATIENT
Start: 2018-05-07 | End: 2018-05-07 | Stop reason: HOSPADM

## 2018-05-07 RX ORDER — MIDAZOLAM HYDROCHLORIDE 1 MG/ML
1 INJECTION, SOLUTION INTRAMUSCULAR; INTRAVENOUS AS NEEDED
Status: DISCONTINUED | OUTPATIENT
Start: 2018-05-07 | End: 2018-05-07 | Stop reason: HOSPADM

## 2018-05-07 RX ORDER — SODIUM CHLORIDE, SODIUM LACTATE, POTASSIUM CHLORIDE, CALCIUM CHLORIDE 600; 310; 30; 20 MG/100ML; MG/100ML; MG/100ML; MG/100ML
INJECTION, SOLUTION INTRAVENOUS
Status: DISCONTINUED | OUTPATIENT
Start: 2018-05-07 | End: 2018-05-07 | Stop reason: HOSPADM

## 2018-05-07 RX ORDER — FENTANYL CITRATE 50 UG/ML
50 INJECTION, SOLUTION INTRAMUSCULAR; INTRAVENOUS AS NEEDED
Status: DISCONTINUED | OUTPATIENT
Start: 2018-05-07 | End: 2018-05-07 | Stop reason: HOSPADM

## 2018-05-07 RX ORDER — SODIUM CHLORIDE 0.9 % (FLUSH) 0.9 %
5-10 SYRINGE (ML) INJECTION EVERY 8 HOURS
Status: DISCONTINUED | OUTPATIENT
Start: 2018-05-07 | End: 2018-05-09 | Stop reason: HOSPADM

## 2018-05-07 RX ORDER — ROCURONIUM BROMIDE 10 MG/ML
INJECTION, SOLUTION INTRAVENOUS AS NEEDED
Status: DISCONTINUED | OUTPATIENT
Start: 2018-05-07 | End: 2018-05-07 | Stop reason: HOSPADM

## 2018-05-07 RX ORDER — PHENYLEPHRINE HCL IN 0.9% NACL 0.4MG/10ML
SYRINGE (ML) INTRAVENOUS AS NEEDED
Status: DISCONTINUED | OUTPATIENT
Start: 2018-05-07 | End: 2018-05-07 | Stop reason: HOSPADM

## 2018-05-07 RX ORDER — ONDANSETRON 2 MG/ML
4 INJECTION INTRAMUSCULAR; INTRAVENOUS AS NEEDED
Status: DISCONTINUED | OUTPATIENT
Start: 2018-05-07 | End: 2018-05-07 | Stop reason: HOSPADM

## 2018-05-07 RX ORDER — SCOLOPAMINE TRANSDERMAL SYSTEM 1 MG/1
PATCH, EXTENDED RELEASE TRANSDERMAL
Status: DISCONTINUED
Start: 2018-05-07 | End: 2018-05-09 | Stop reason: HOSPADM

## 2018-05-07 RX ORDER — SCOLOPAMINE TRANSDERMAL SYSTEM 1 MG/1
1 PATCH, EXTENDED RELEASE TRANSDERMAL
Status: DISCONTINUED | OUTPATIENT
Start: 2018-05-07 | End: 2018-05-09 | Stop reason: HOSPADM

## 2018-05-07 RX ORDER — FENTANYL CITRATE 50 UG/ML
INJECTION, SOLUTION INTRAMUSCULAR; INTRAVENOUS AS NEEDED
Status: DISCONTINUED | OUTPATIENT
Start: 2018-05-07 | End: 2018-05-07 | Stop reason: HOSPADM

## 2018-05-07 RX ORDER — FENTANYL CITRATE 50 UG/ML
25 INJECTION, SOLUTION INTRAMUSCULAR; INTRAVENOUS
Status: COMPLETED | OUTPATIENT
Start: 2018-05-07 | End: 2018-05-07

## 2018-05-07 RX ORDER — ENOXAPARIN SODIUM 100 MG/ML
40 INJECTION SUBCUTANEOUS EVERY 24 HOURS
Status: DISCONTINUED | OUTPATIENT
Start: 2018-05-08 | End: 2018-05-09 | Stop reason: HOSPADM

## 2018-05-07 RX ORDER — NEOSTIGMINE METHYLSULFATE 1 MG/ML
INJECTION INTRAVENOUS AS NEEDED
Status: DISCONTINUED | OUTPATIENT
Start: 2018-05-07 | End: 2018-05-07 | Stop reason: HOSPADM

## 2018-05-07 RX ORDER — ACETAMINOPHEN 10 MG/ML
1000 INJECTION, SOLUTION INTRAVENOUS EVERY 8 HOURS
Status: COMPLETED | OUTPATIENT
Start: 2018-05-07 | End: 2018-05-08

## 2018-05-07 RX ORDER — ONDANSETRON 2 MG/ML
4 INJECTION INTRAMUSCULAR; INTRAVENOUS
Status: DISCONTINUED | OUTPATIENT
Start: 2018-05-07 | End: 2018-05-09 | Stop reason: HOSPADM

## 2018-05-07 RX ORDER — HYDROMORPHONE HYDROCHLORIDE 1 MG/ML
1 INJECTION, SOLUTION INTRAMUSCULAR; INTRAVENOUS; SUBCUTANEOUS
Status: DISCONTINUED | OUTPATIENT
Start: 2018-05-07 | End: 2018-05-09 | Stop reason: HOSPADM

## 2018-05-07 RX ORDER — DEXAMETHASONE SODIUM PHOSPHATE 4 MG/ML
INJECTION, SOLUTION INTRA-ARTICULAR; INTRALESIONAL; INTRAMUSCULAR; INTRAVENOUS; SOFT TISSUE AS NEEDED
Status: DISCONTINUED | OUTPATIENT
Start: 2018-05-07 | End: 2018-05-07 | Stop reason: HOSPADM

## 2018-05-07 RX ORDER — DIPHENHYDRAMINE HYDROCHLORIDE 50 MG/ML
12.5 INJECTION, SOLUTION INTRAMUSCULAR; INTRAVENOUS AS NEEDED
Status: DISCONTINUED | OUTPATIENT
Start: 2018-05-07 | End: 2018-05-07 | Stop reason: HOSPADM

## 2018-05-07 RX ORDER — CEFAZOLIN SODIUM IN 0.9 % NACL 2 G/100 ML
PLASTIC BAG, INJECTION (ML) INTRAVENOUS AS NEEDED
Status: DISCONTINUED | OUTPATIENT
Start: 2018-05-07 | End: 2018-05-07 | Stop reason: HOSPADM

## 2018-05-07 RX ORDER — CEFAZOLIN SODIUM/WATER 2 G/20 ML
2 SYRINGE (ML) INTRAVENOUS
Status: DISCONTINUED | OUTPATIENT
Start: 2018-05-07 | End: 2018-05-07 | Stop reason: HOSPADM

## 2018-05-07 RX ORDER — KETAMINE HYDROCHLORIDE 10 MG/ML
INJECTION, SOLUTION INTRAMUSCULAR; INTRAVENOUS AS NEEDED
Status: DISCONTINUED | OUTPATIENT
Start: 2018-05-07 | End: 2018-05-07 | Stop reason: HOSPADM

## 2018-05-07 RX ORDER — ALBUTEROL SULFATE 0.83 MG/ML
2.5 SOLUTION RESPIRATORY (INHALATION) AS NEEDED
Status: DISCONTINUED | OUTPATIENT
Start: 2018-05-07 | End: 2018-05-07 | Stop reason: HOSPADM

## 2018-05-07 RX ORDER — SODIUM CHLORIDE, SODIUM LACTATE, POTASSIUM CHLORIDE, CALCIUM CHLORIDE 600; 310; 30; 20 MG/100ML; MG/100ML; MG/100ML; MG/100ML
1000 INJECTION, SOLUTION INTRAVENOUS CONTINUOUS
Status: DISCONTINUED | OUTPATIENT
Start: 2018-05-07 | End: 2018-05-07 | Stop reason: HOSPADM

## 2018-05-07 RX ORDER — LORAZEPAM 2 MG/ML
1 INJECTION INTRAMUSCULAR
Status: DISCONTINUED | OUTPATIENT
Start: 2018-05-07 | End: 2018-05-09 | Stop reason: HOSPADM

## 2018-05-07 RX ORDER — ALBUMIN HUMAN 50 G/1000ML
SOLUTION INTRAVENOUS AS NEEDED
Status: DISCONTINUED | OUTPATIENT
Start: 2018-05-07 | End: 2018-05-07 | Stop reason: HOSPADM

## 2018-05-07 RX ADMIN — MIDAZOLAM HYDROCHLORIDE 2 MG: 1 INJECTION, SOLUTION INTRAMUSCULAR; INTRAVENOUS at 13:15

## 2018-05-07 RX ADMIN — GLYCOPYRROLATE 0.4 MG: 0.2 INJECTION INTRAMUSCULAR; INTRAVENOUS at 16:49

## 2018-05-07 RX ADMIN — HYDROMORPHONE HYDROCHLORIDE 0.2 MG: 1 INJECTION, SOLUTION INTRAMUSCULAR; INTRAVENOUS; SUBCUTANEOUS at 18:08

## 2018-05-07 RX ADMIN — HYDROMORPHONE HYDROCHLORIDE 0.2 MG: 1 INJECTION, SOLUTION INTRAMUSCULAR; INTRAVENOUS; SUBCUTANEOUS at 17:11

## 2018-05-07 RX ADMIN — FENTANYL CITRATE 25 MCG: 50 INJECTION, SOLUTION INTRAMUSCULAR; INTRAVENOUS at 17:10

## 2018-05-07 RX ADMIN — ROCURONIUM BROMIDE 30 MG: 10 INJECTION, SOLUTION INTRAVENOUS at 15:39

## 2018-05-07 RX ADMIN — HYDROMORPHONE HYDROCHLORIDE 0.5 MG: 2 INJECTION, SOLUTION INTRAMUSCULAR; INTRAVENOUS; SUBCUTANEOUS at 17:00

## 2018-05-07 RX ADMIN — Medication 2 G: at 13:42

## 2018-05-07 RX ADMIN — HYDROMORPHONE HYDROCHLORIDE 0.2 MG: 1 INJECTION, SOLUTION INTRAMUSCULAR; INTRAVENOUS; SUBCUTANEOUS at 17:26

## 2018-05-07 RX ADMIN — KETAMINE HYDROCHLORIDE 15 MG: 10 INJECTION, SOLUTION INTRAMUSCULAR; INTRAVENOUS at 13:35

## 2018-05-07 RX ADMIN — NEOSTIGMINE METHYLSULFATE 4 MG: 1 INJECTION INTRAVENOUS at 16:49

## 2018-05-07 RX ADMIN — SODIUM CHLORIDE 5 MG: 9 INJECTION INTRAMUSCULAR; INTRAVENOUS; SUBCUTANEOUS at 19:00

## 2018-05-07 RX ADMIN — PROPOFOL 150 MG: 10 INJECTION, EMULSION INTRAVENOUS at 13:27

## 2018-05-07 RX ADMIN — DEXAMETHASONE SODIUM PHOSPHATE 8 MG: 4 INJECTION, SOLUTION INTRA-ARTICULAR; INTRALESIONAL; INTRAMUSCULAR; INTRAVENOUS; SOFT TISSUE at 13:32

## 2018-05-07 RX ADMIN — FENTANYL CITRATE 25 MCG: 50 INJECTION, SOLUTION INTRAMUSCULAR; INTRAVENOUS at 17:25

## 2018-05-07 RX ADMIN — HYDROMORPHONE HYDROCHLORIDE 0.5 MG: 2 INJECTION, SOLUTION INTRAMUSCULAR; INTRAVENOUS; SUBCUTANEOUS at 15:06

## 2018-05-07 RX ADMIN — FENTANYL CITRATE 25 MCG: 50 INJECTION, SOLUTION INTRAMUSCULAR; INTRAVENOUS at 17:15

## 2018-05-07 RX ADMIN — HYDROMORPHONE HYDROCHLORIDE 0.2 MG: 1 INJECTION, SOLUTION INTRAMUSCULAR; INTRAVENOUS; SUBCUTANEOUS at 17:50

## 2018-05-07 RX ADMIN — PROPOFOL 50 MG: 10 INJECTION, EMULSION INTRAVENOUS at 16:17

## 2018-05-07 RX ADMIN — FENTANYL CITRATE 100 MCG: 50 INJECTION, SOLUTION INTRAMUSCULAR; INTRAVENOUS at 13:55

## 2018-05-07 RX ADMIN — ONDANSETRON 4 MG: 2 INJECTION INTRAMUSCULAR; INTRAVENOUS at 16:41

## 2018-05-07 RX ADMIN — FENTANYL CITRATE 100 MCG: 50 INJECTION, SOLUTION INTRAMUSCULAR; INTRAVENOUS at 13:24

## 2018-05-07 RX ADMIN — SODIUM CHLORIDE, SODIUM LACTATE, POTASSIUM CHLORIDE, CALCIUM CHLORIDE: 600; 310; 30; 20 INJECTION, SOLUTION INTRAVENOUS at 13:15

## 2018-05-07 RX ADMIN — SODIUM CHLORIDE, SODIUM LACTATE, POTASSIUM CHLORIDE, AND CALCIUM CHLORIDE 1000 ML: 600; 310; 30; 20 INJECTION, SOLUTION INTRAVENOUS at 11:08

## 2018-05-07 RX ADMIN — ACETAMINOPHEN 1000 MG: 10 INJECTION, SOLUTION INTRAVENOUS at 19:02

## 2018-05-07 RX ADMIN — Medication 2 G: at 16:41

## 2018-05-07 RX ADMIN — Medication 40 MCG: at 15:29

## 2018-05-07 RX ADMIN — HYDROMORPHONE HYDROCHLORIDE 1 MG: 1 INJECTION, SOLUTION INTRAMUSCULAR; INTRAVENOUS; SUBCUTANEOUS at 19:30

## 2018-05-07 RX ADMIN — PROPOFOL 30 MG: 10 INJECTION, EMULSION INTRAVENOUS at 13:23

## 2018-05-07 RX ADMIN — PROPOFOL 50 MG: 10 INJECTION, EMULSION INTRAVENOUS at 15:11

## 2018-05-07 RX ADMIN — ROCURONIUM BROMIDE 30 MG: 10 INJECTION, SOLUTION INTRAVENOUS at 13:56

## 2018-05-07 RX ADMIN — LIDOCAINE HYDROCHLORIDE 40 MG: 20 INJECTION, SOLUTION EPIDURAL; INFILTRATION; INTRACAUDAL; PERINEURAL at 13:27

## 2018-05-07 RX ADMIN — FENTANYL CITRATE 25 MCG: 50 INJECTION, SOLUTION INTRAMUSCULAR; INTRAVENOUS at 17:20

## 2018-05-07 RX ADMIN — Medication 10 ML: at 22:00

## 2018-05-07 RX ADMIN — PROPOFOL 20 MG: 10 INJECTION, EMULSION INTRAVENOUS at 13:30

## 2018-05-07 RX ADMIN — SODIUM CHLORIDE, SODIUM LACTATE, POTASSIUM CHLORIDE, CALCIUM CHLORIDE: 600; 310; 30; 20 INJECTION, SOLUTION INTRAVENOUS at 14:58

## 2018-05-07 RX ADMIN — ROCURONIUM BROMIDE 50 MG: 10 INJECTION, SOLUTION INTRAVENOUS at 13:27

## 2018-05-07 RX ADMIN — HYDROMORPHONE HYDROCHLORIDE 0.2 MG: 1 INJECTION, SOLUTION INTRAMUSCULAR; INTRAVENOUS; SUBCUTANEOUS at 17:37

## 2018-05-07 RX ADMIN — ONDANSETRON 4 MG: 2 INJECTION INTRAMUSCULAR; INTRAVENOUS at 17:25

## 2018-05-07 RX ADMIN — ROCURONIUM BROMIDE 15 MG: 10 INJECTION, SOLUTION INTRAVENOUS at 16:20

## 2018-05-07 RX ADMIN — HYDROMORPHONE HYDROCHLORIDE 0.5 MG: 2 INJECTION, SOLUTION INTRAMUSCULAR; INTRAVENOUS; SUBCUTANEOUS at 16:49

## 2018-05-07 RX ADMIN — ALBUMIN HUMAN 250 ML: 50 SOLUTION INTRAVENOUS at 15:28

## 2018-05-07 RX ADMIN — ROCURONIUM BROMIDE 20 MG: 10 INJECTION, SOLUTION INTRAVENOUS at 15:03

## 2018-05-07 RX ADMIN — HYDROMORPHONE HYDROCHLORIDE 0.5 MG: 2 INJECTION, SOLUTION INTRAMUSCULAR; INTRAVENOUS; SUBCUTANEOUS at 16:53

## 2018-05-07 RX ADMIN — HYDROMORPHONE HYDROCHLORIDE 1 MG: 1 INJECTION, SOLUTION INTRAMUSCULAR; INTRAVENOUS; SUBCUTANEOUS at 21:53

## 2018-05-07 NOTE — ANESTHESIA PREPROCEDURE EVALUATION
Anesthetic History   No history of anesthetic complications            Review of Systems / Medical History  Patient summary reviewed, nursing notes reviewed and pertinent labs reviewed    Pulmonary  Within defined limits                 Neuro/Psych   Within defined limits           Cardiovascular  Within defined limits                Exercise tolerance: >4 METS     GI/Hepatic/Renal     GERD: poorly controlled           Endo/Other        Obesity     Other Findings              Physical Exam    Airway  Mallampati: II  TM Distance: > 6 cm  Neck ROM: normal range of motion   Mouth opening: Normal     Cardiovascular  Regular rate and rhythm,  S1 and S2 normal,  no murmur, click, rub, or gallop             Dental  No notable dental hx       Pulmonary  Breath sounds clear to auscultation               Abdominal  GI exam deferred       Other Findings            Anesthetic Plan    ASA: 2  Anesthesia type: general          Induction: Intravenous  Anesthetic plan and risks discussed with: Patient

## 2018-05-07 NOTE — BRIEF OP NOTE
BRIEF OPERATIVE NOTE    Date of Procedure: 5/7/2018   Preoperative Diagnosis: HIATAL HERNIA, REFLUX ESOPHAGITIS  Postoperative Diagnosis: HIATAL HERNIA, REFLUX ESOPHAGITIS    Procedure(s):  LAPAROSCOPIC RESECTION GASTRIC POUCH, REVISION GASTROJEUNOSTOMY, AND HIATAL HERNIA REPAIR  ESOPHAGOGASTRODUODENOSCOPY (EGD)  Surgeon(s) and Role:     * Odalys Nash MD - Primary         Surgical Assistant: Nate Schultz PA-C    Surgical Staff:  Circ-1: Charlotte Flood RN  Circ-Relief: Shanelle Rowland RN  Physician Assistant: VARGHESE Mcwilliams  Scrub Tech-1: Hugo Da Silva  Scrub Tech-Relief: Sawyer Jews; Patricon Elicia  Event Time In   Incision Start 1344   Incision Close      Anesthesia: General   Estimated Blood Loss: 100 cc  Specimens:   ID Type Source Tests Collected by Time Destination   1 : ANASTOMOTIC DONUTS Fresh Small Bowel  Odalys Nash MD 5/7/2018 1613 Pathology   2 : segment of small intestine Fresh Small Bowel  Odalys Nash MD 5/7/2018 1615 Pathology   3 : Subtotal Gastrectomy with Gastric Jejunostomy Fresh Gastric  Odalys Nash MD 5/7/2018 1625 Pathology      Findings: Enlarged pouch; 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  Item Serial No.  Lot No. LRB No. Used Action   SEAMGUARD STAPLE LINE REINFORCEMENT     N/A GORE [id number] N/A 3 Implanted

## 2018-05-07 NOTE — H&P
HISTORY OF PRESENT ILLNESS    Cassy Cordoba is a 58 y.o. female with a history of open gastric bypass (2000), now with worsening GERD symptoms, regurgitation. Chief Complaint   Patient presents with           for revision of pouch/GJ with lap Nissen by dr Johnna Sauer on 5/7/18           Patient Active Problem List   Diagnosis Code    Insomnia, unspecified G47.00    Pernicious anemia D51.0    Vitamin D deficiency E55.9    Reflux esophagitis K21.0    Gastric ulcer K25.9    Symptomatic menopausal or female climacteric states N95.1    Osteoporosis M81.0    Iron deficiency anemia, unspecified D50.9    Onychomycosis B35.1    Active advance directive on file Z78.9    Hypercalcemia E83.52    H/O hyperparathyroidism Z86.39    Broken toe S92.919A    B12 deficiency E53.8    Gastroesophageal reflux disease without esophagitis K21.9    Diverticulosis K57.90           Past Medical History:   Diagnosis Date    Arthritis       OSTEO    Broken toe 2017     right pinky toe    Chronic pain       LT. ARM    GERD (gastroesophageal reflux disease)      History of blood transfusion 1995     [facility] [place], NO REACTION; needed transfusion from surgery-ovarian cyst and hit artery per pt.  Infection 2012     left shoulder    Morbid obesity ([address] 3/23/2011    PUD (peptic ulcer disease)      Thyroid disease              Past Surgical History:   Procedure Laterality Date    BIOPSY/EXCISION, LYMPH NODE(S)        BREAST SURGERY PROCEDURE UNLISTED Left       LT.  TUMOR BENIGN REMOVED AGE 18    COLONOSCOPY Left 3/20/2018     COLONOSCOPY performed by Brandon Ziegler MD at [facility]    ENDOCRINE SURGERY [address] UNLISTED        HX ABDOMINOPLASTY   2006    HX ADENOIDECTOMY        HX CARPAL TUNNEL RELEASE   2001     RIGHT    HX CHOLECYSTECTOMY   2000    HX CHOLECYSTECTOMY   2000     at time of gastric bypass     HX COLONOSCOPY         2014, due 17 vs 19    HX GASTRIC BYPASS   12-13-00     dr. Jalaine Boeck HX GI         COLONOSCOPY    HX GI   2016     endoscopy, colonoscopy    HX HEENT   1995     TUMOR REMOVED neck (benign)    HX HEENT   05/2017     PARATHYROID EXCISION    HX HYSTERECTOMY   1993    HX ORTHOPAEDIC         CYCST REMOVED BASE OF SPINE AGE 19    HX ORTHOPAEDIC Right 2004,2009     RIGHT ANKLE FX    HX ORTHOPAEDIC Left 4/2012     LT.  SHOULDER AND HUMERUS FX, SCREW AND PLATE ( has been removed)    HX ORTHOPAEDIC Right 2014     shoulder fx, orif    HX ORTHOPAEDIC Right       CARPAL TUNNEL    HX OTHER SURGICAL         TUMOR REMOVED RIGHT UPPER FLANK AREA    HX TONSIL AND ADENOIDECTOMY         AGE7    HX TONSILLECTOMY        HX TUBAL LIGATION   1981    LEG/ANKLE SURGERY PROC UNLISTED         right ankle cartilage    LEG/ANKLE SURGERY PROC UNLISTED   2009      ankle      Social History            Social History    Marital status:        Spouse name: N/A    Number of children: 1    Years of education: N/A           Occupational History    computer                 Social History Main Topics    Smoking status: Former Smoker       Packs/day: 0.50       Years: 7.00       Types: Cigarettes       Quit date: 3/18/2018    Smokeless tobacco: Never Used    Alcohol use 2.4 oz/week        1 Glasses of wine, 3 Cans of beer per week          Comment: 6 PACK/every 2 weeks    Drug use: No    Sexual activity: Not on file           Other Topics Concern    Not on file          Social History Narrative     In the home with sister/spouse      Adult son             Family History   Problem Relation Age of Onset    Other Mother         hypoglycemia, obese,[place] corey syndrome    Cancer Mother         LUNG    Lung Disease Mother      Obesity Mother      Cancer Father         GENERALIZED    Other Father         [place] corey syndrome gene trait    Asthma Sister      Lung Disease Sister      Hypertension Brother      Lung Disease Brother      Diabetes Brother      Thyroid Disease Brother      Other Brother         [place] corey syndrome    Cancer Brother         COLON    Heart Disease Maternal Aunt      Cancer Maternal Aunt      Cancer Maternal Uncle         UNKNOWN    Cancer Paternal Uncle      Heart Disease Maternal Grandmother      Heart Disease Maternal Grandfather      Heart Disease Paternal Grandmother      Heart Disease Paternal Grandfather      Cancer Paternal Uncle      Lung Disease Brother      Cancer Maternal Uncle      Cancer Paternal Aunt         BREAST    Anesth Problems Neg Hx           Current Outpatient Prescriptions:     mv-mn-iron-FA-Ca carb-vit K (WOMEN'S MULTIVITAMIN) 18 mg iron-400 mcg-500 mg tab, Take 1 Tab by mouth daily. , Disp: , Rfl:     esomeprazole (NEXIUM) 40 mg capsule, Take 40 mg by mouth as needed. , Disp: , Rfl:     sucralfate (CARAFATE) 1 gram tablet, Take 1 g by mouth three (3) times daily. , Disp: , Rfl:     DOCUSATE SODIUM (STOOL SOFTENER PO), Take 1 Tab by mouth daily. , Disp: , Rfl:     cholecalciferol (VITAMIN D3) 1,000 unit tablet, Take 1 Tab by mouth daily. , Disp: 90 Tab, Rfl: 3    calcium carbonate (CALTREX) 600 mg calcium (1,500 mg) tablet, Take 1 Tab by mouth daily. , Disp: 90 Tab, Rfl: 3    psyllium (METAMUCIL) 0.52 gram capsule, Take 1 Cap by mouth daily. , Disp: 90 Cap, Rfl: 3    MELATONIN PO, Take 5 mg by mouth nightly as needed. , Disp: , Rfl:     cyanocobalamin (VITAMIN B-12) 1,000 mcg tablet, Take 1,000 mcg by mouth daily. , Disp: , Rfl:         Allergies   Allergen Reactions    Codeine Hives       Tolerates Dilaudid      PCP Real Campos MD     Review of Systems   Constitutional: Negative for chills, fever, malaise/fatigue and weight loss. HENT: Positive for congestion (seasonal ). Negative for ear discharge, ear pain, hearing loss, nosebleeds, sinus pain, sore throat and tinnitus. Eyes: Negative for blurred vision.  Glasses   Respiratory: Negative for cough (denies post prandial and night cough ), sputum production, shortness of breath, wheezing and stridor. Cardiovascular: Positive for leg swelling (ankles R.L). Negative for chest pain and palpitations. Gastrointestinal: Positive for abdominal pain (epigastric ), constipation (stool softener and fiber helps ), heartburn (terrible ), nausea and vomiting (no hematemesis ). Negative for blood in stool (not since March ) and melena. Has been using boost and ensure to get more protein. Past few months increased food intolerance, especially meats; + bile reflux, rarely food;Sleeps elevated    Genitourinary: Positive for urgency. Musculoskeletal: Positive for joint pain (some mild joint pain ). Negative for back pain, falls, myalgias and neck pain. Skin: Negative. Neurological: Negative for dizziness, loss of consciousness and headaches. Endo/Heme/Allergies: Hx transfusion. Anemia recently   - PICA    Psychiatric/Behavioral: The patient does not have insomnia. Sleep is fair          Physical Exam   Visit Vitals    /77 (BP 1 Location: Right arm, BP Patient Position: At rest;Supine)    Pulse 60    Temp 98.1 °F (36.7 °C)    Resp 16    SpO2 99%       Constitutional: She is oriented to person, place, and time. No distress. White female, unaccompanied. Well groomed    HENT:   Head: Normocephalic. Mouth/Throat: No oropharyngeal exudate. Eyes: Pupils are equal, round, and reactive to light. No scleral icterus. Neck: Normal range of motion. Neck supple. No JVD present. No tracheal deviation present. No thyromegaly present. Cardiovascular: Normal rate and regular rhythm. Pulmonary/Chest: Effort normal and breath sounds normal.   Abdominal: Soft. Bowel sounds are normal. She exhibits mass (some fullness along the midline periumbilical region, no definite hernia defect appreciated ). She exhibits no distension. There is no tenderness. There is no rebound and no guarding. Musculoskeletal: She exhibits no edema.    Ambulating independently    Lymphadenopathy:     She has no cervical adenopathy. Neurological: She is alert and oriented to person, place, and time. Skin: Skin is warm and dry. She is not diaphoretic. Psychiatric: She has a normal mood and affect.      1/22/18 UGI   EXAM:  OF UPPER GI SERIES W KUB   INDICATION:   h/o danii en y gastric bypass, epigastric pain and vomiting,  ?stricture, ? reflux   COMPARISON: None.   FLUOROSCOPY DOSE (PKA, DAP): 57 Gycm2.   FINDINGS:  The preliminary radiograph of the abdomen demonstrates a nonobstructive gas  pattern. There are surgical clips in the abdomen   A a single-contrast examination was performed. The patient ingested barium  without difficulty.    Patient is status post gastric bypass.   Esophageal motility and caliber are normal.   Patient status post gastric bypass. The pouch measures 7 cm x 7.5 cm 8 cm. There  is no leak or obstruction.  Brien Au is a moderate amount of reflux into the proximal esophagus.   IMPRESSION  IMPRESSION:  Patient is status post gastric bypass which measures 7 x 7.5 x 8  cm. There is no leak or obstruction. There is a moderate amount of reflux into  the proximal esophagus. .        3/7/18 EGD    3/20/18 Colonoscopy  Indications:   Lower GI bleeding - painless, s/p gastric bypass  Referring Physician: Hari Boswell MD   Anesthesia/Sedation:MAC  Endoscopist:  Dr. Yves Manzo  Assistant:  Endoscopy Missy Bales: Gilberto Shoemaker  Endoscopy RN-1: Crystal Stallings      Preoperative diagnosis: Colon      Postoperative diagnosis: 1. Diverticulosis  2. Sigmoid Colon Polyp  3. Rectal Colon Polyp     4/12/18 Manometry   PREPROCEDURE DIAGNOSES:  1.  Gastroesophageal reflux disease. 2.  Surgery is under consideration.      PROCEDURES PLANNED AND PERFORMED:  1.  High resolution esophageal manometry. 2.  Impedance esophageal manometry.      POSTPROCEDURE DIAGNOSES:  1.  Normal relaxation of esophagogastric junction. 2.  Weak peristalsis with small defects. PREPROCEDURE DIAGNOSES:  1.  Gastroesophageal reflux disease. 2.  Surgery is under consideration.         ASSESSMENT and PLAN      ICD-10-CM ICD-9-CM     1. Gastroesophageal reflux disease, esophagitis presence not specified K21.9 530.81     2. 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 3     3. BMI 31.0-31.9,adult Z68.31 V85.31     4. Essential hypertension I10 401. 9        Scheduled for revision  laparoscopic partial pouch resection to decrease parietal cell mass and creation of new gastrojejunostomy of the appropriate size; possible partial vs full fundoplication using excluded stomach. All of her questions have been answered. She understands revisional bariatric surgery is associated with 3-5x higher risk than primary procedure (risks to include bleeding, infection, conversion to open procedure, staple line leak, injury to surrounding structures, recurrent/persistent symptoms, dysphagia). She desires to proceed.

## 2018-05-07 NOTE — IP AVS SNAPSHOT
[address] 
[phone] Patient: Nicole Ramon MRN: [id number] GWR:[id number] About your hospitalization You were admitted on: May 7, 2018 You last received care in the:  [facility] 4 SURG/BARIATRICS You were discharged on:  May 9, 2018 Why you were hospitalized Your primary diagnosis was:  Not on File Your diagnoses also included:  Gastroesophageal Reflux Disease Without Esophagitis Follow-up Information Follow up With Details Comments Contact Info Rafael Rothman MD   [address] [place] [address] [address] [place] [address] 
[phone] Your Scheduled Appointments Monday May 21, 2018 10:40 AM EDT  
POST OP 10 MIN with Dieudonne Noble NP  
[facility] 22 127 ([address]) [address] 7 [phone]  
[phone] Monday June 04, 2018  9:00 AM EDT  
POST OP 10 MIN with Dieudonne Noble NP  
[facility] 22 579 ([address]) [address] 7 [address]  
[phone] Monday June 18, 2018  9:00 AM EDT  
POST OP 10 MIN with Dieduonne Noble NP  
[facility] [address] 314 ([address]) [address] 7 [address]  
[phone] Tuesday July 03, 2018  2:30 PM EDT  
(Arrive by 2:15 PM) DEXA BNE DNSTY STDY AXIAL with JON DEXA 1 [facility]) 149 [facility] 7 [phone]  
[phone] Please, no calcium supplements or antacids that coat the stomach (ex: Tums, Mylanta) 24 hours prior to procedure. Maintain normal diet and medications. Dairy products are allowed. Wear an outfit with an elastic waistband (no zipper or metal snaps). Check in at registration 15min before your appointment time unless you were instructed to do otherwise. Please arrive 15 minutes prior to appointment to register. Discharge Orders None A check viktor indicates which time of day the medication should be taken. My Medications START taking these medications Instructions Each Dose to Equal  
 Morning Noon Evening Bedtime  
 oxyCODONE-acetaminophen 5-325 mg per tablet Commonly known as:  PERCOCET Your last dose was: Your next dose is: Take 1-2 Tabs by mouth every four (4) hours as needed. Max Daily Amount: 12 Tabs. 1-2 Tab CONTINUE taking these medications Instructions Each Dose to Equal  
 Morning Noon Evening Bedtime  
 calcium carbonate 600 mg calcium (1,500 mg) tablet Commonly known as:  Samia Snide Your last dose was: Your next dose is: Take 1 Tab by mouth daily. 600 mg  
    
   
   
   
  
 cholecalciferol 1,000 unit tablet Commonly known as:  VITAMIN D3 Your last dose was: Your next dose is: Take 1 Tab by mouth daily. 1000 Units  
    
   
   
   
  
 esomeprazole 40 mg capsule Commonly known as:  Nelson Breed Your last dose was: Your next dose is: Take 40 mg by mouth as needed. 40 mg  
    
   
   
   
  
 MELATONIN PO Your last dose was: Your next dose is: Take 5 mg by mouth nightly as needed. 5 mg  
    
   
   
   
  
 psyllium 0.52 gram capsule Commonly known as:  METAMUCIL Your last dose was: Your next dose is: Take 1 Cap by mouth daily. 1 Cap STOOL SOFTENER PO Your last dose was: Your next dose is: Take 1 Tab by mouth daily. 1 Tab VITAMIN B-12 1,000 mcg tablet Generic drug:  cyanocobalamin Your last dose was: Your next dose is: Take 1,000 mcg by mouth daily. 1000 mcg WOMEN'S MULTIVITAMIN 18 mg iron-400 mcg-500 mg Tab Generic drug:  mv-mn-iron-FA-Ca carb-vit K Your last dose was: Your next dose is: Take 1 Tab by mouth daily. 1 Tab STOP taking these medications CARAFATE 1 gram tablet Generic drug:  sucralfate Where to Get Your Medications Information on where to get these meds will be given to you by the nurse or doctor. ! Ask your nurse or doctor about these medications  
  oxyCODONE-acetaminophen 5-325 mg per tablet Opioid Education Prescription Opioids: What You Need to Know: 
 
Prescription opioids can be used to help relieve moderate-to-severe pain and are often prescribed following a surgery or injury, or for certain health conditions. These medications can be an important part of treatment but also come with serious risks. Opioids are strong pain medicines. Examples include hydrocodone, oxycodone, fentanyl, and morphine. Heroin is an example of an illegal opioid. It is important to work with your health care provider to make sure you are getting the safest, most effective care. WHAT ARE THE RISKS AND SIDE EFFECTS OF OPIOID USE? Prescription opioids carry serious risks of addiction and overdose, especially with prolonged use. An opioid overdose, often marked by slow breathing, can cause sudden death. The use of prescription opioids can have a number of side effects as well, even when taken as directed. · Tolerance-meaning you might need to take more of a medication for the same pain relief · Physical dependence-meaning you have symptoms of withdrawal when the medication is stopped. Withdrawal symptoms can include nausea, sweating, chills, diarrhea, stomach cramps, and muscle aches. Withdrawal can last up to several weeks, depending on which drug you took and how long you took it. · Increased sensitivity to pain · Constipation · Nausea, vomiting, and dry mouth · Sleepiness and dizziness · Confusion · Depression · Low levels of testosterone that can result in lower sex drive, energy, and strength · Itching and sweating RISKS ARE GREATER WITH:      
· History of drug misuse, substance use disorder, or overdose · Mental health conditions (such as depression or anxiety) · Sleep apnea · Older age (72 years or older) · Pregnancy Avoid alcohol while taking prescription opioids. Also, unless specifically advised by your health care provider, medications to avoid include: · Benzodiazepines (such as Xanax or Valium) · Muscle relaxants (such as Soma or Flexeril) · Hypnotics (such as Ambien or Lunesta) · Other prescription opioids KNOW YOUR OPTIONS Talk to your health care provider about ways to manage your pain that don't involve prescription opioids. Some of these options may actually work better and have fewer risks and side effects. Options may include: 
· Pain relievers such as acetaminophen, ibuprofen, and naproxen · Some medications that are also used for depression or seizures · Physical therapy and exercise · Counseling to help patients learn how to cope better with triggers of pain and stress. · Application of heat or cold compress · Massage therapy · Relaxation techniques Be Informed Make sure you know the name of your medication, how much and how often to take it, and its potential risks & side effects. IF YOU ARE PRESCRIBED OPIOIDS FOR PAIN: 
· Never take opioids in greater amounts or more often than prescribed. Remember the goal is not to be pain-free but to manage your pain at a tolerable level. · Follow up with your primary care provider to: · Work together to create a plan on how to manage your pain. · Talk about ways to help manage your pain that don't involve prescription opioids. · Talk about any and all concerns and side effects. · Help prevent misuse and abuse. · Never sell or share prescription opioids · Help prevent misuse and abuse. · Store prescription opioids in a secure place and out of reach of others (this may include visitors, children, friends, and family). · Safely dispose of unused/unwanted prescription opioids: Find your community drug take-back program or your pharmacy mail-back program, or flush them down the toilet, following guidance from the Food and Drug Administration (www.fda.gov/Drugs/ResourcesForYou). · Visit www.cdc.gov/drugoverdose to learn about the risks of opioid abuse and overdose. · If you believe you may be struggling with addiction, tell your health care provider and ask for guidance or call [employer] at 3-747-267-TPTX. Discharge Instructions Gastric Bypass Patient Discharge Instructions General Surgery at [facility] 
[phone] You have a 2 week follow up in place Future Appointments Date Time Provider Deyanira Estrada 5/21/2018 10:40 AM EVA Cortes SCHED  
6/4/2018 9:00 AM EVA Cortes SCHED  
6/18/2018 9:00 AM EVA Cortes 1. Activities: You may be active walking, stair climbing, and doing light weight activities with one to two-pound weights, sitting in a chair using different arm motions. Major restrictions include driving until your first office visit and lifting anything heavier than ten pounds. It is very important that you walk frequently. In addition to walking, you should continue to use your incentive spirometer (breathing exercises) throughout the day. 2. Caring for vour incision (s}: Your surgical incision(s) will be covered with Derma bond (super glue). You may shower as desired and simply pat dry the area over the incision(s).  There may occasionally be seepage of yellowish to yellowish-maroon dissolved fat from your wound, which is of no major concern as long as the wound is not red, hardened in the area of the drainage, or if the drainage has a foul smell. If there are any questions, call our office for further instructions. If there is this type of dissolved fat drainage,  the shower and gently express the fluid from your wound. Then keep gauze pads over the area to protect both the wound and your clothes. 3. When to call the office immediately: 
 
 
*Chest pain (not associated with eating/drinking) *Shortness of breath (more than normal) *Sudden pain and/or redness in calf *Fever greater than 101F 
*Persistent nausea and/or vomiting (unable to keep down any liquids) *Bleeding from incision(s) *Severe abdominal pain *Any other concerning symptoms 4. Diet: There are three main priorities as far as your diet is concerned--- 
 
a. Clear Liquids-drink from 1 oz. cups or standard shot glass. These liquids are non-carbonated, no added sugar, and not irritating to your stomach. They may include water, tea, coffee\" 100% no added sugar juices (avoid citrus) , clear broth, blenderized clear soups such as [employer]' Healthy Request Chicken Noodle and Chicken & Rice, Sugar-free products including popsicles, Dionte-Aid, and Crystal Lite. b. Vitamins: Multi-vitamin with iron in the morning and evening, making sure 
it's not the first thing or the last thing taken. The other Vitamin B-12 and 
vitamin A & D capsule may be taken once during the day anytime that you 
can make a routine of it. 
 
c. Protein Intake: During your initial two-three weeks when your body is switching from burning glucose to directly burning fat, there is an attempt by your body to use protein as a fuel. To protect that protein, we like you to exercise as listed above, and to try to take in 50-60 grams of protein per day. This can be done with four 8 oz.  serving of skim milk and Low Carb [place] Instant Breakfast. Each 8 oz. should be considered a meal-breakfast, lunch, or supper, and during this mealtime it should be the sole ingested substance. Stop your clear liquids for 20 minutes before your start on the instant breakfast, which is sipped 1 oz. at a time. You may also try the following substitute for [place] Instant Breakfast: skim milk-fat free powdered milk + Egg Beaters + flavor extract. If desired, ice may be added and blenderized in the . If the milk upsets your stomach, you may purchase Lactaid tablets from any drug store. Lactaid skim milk may be purchased at most major supermarkets. Another alternative is Boost Diabetic, which is Lactose-free and ready to drink. There are many protein supplements on the market. Whey and Soy based protein powders/drinks are acceptable. If you have any questions about specific products please call the office. If you are having difficulty with your diet, please call the office. 5. Medications: Take no more than 2 pills at a time. Wait 20 minutes between pills. a. Vitamins as listed above---multi-vitamin with iron twice a day, B-12 once a day, vitamin A& D once a day. b. Acid reducing medicineWill be prescribed by your surgeon at discharge. c. Mylanta Plusone to two teaspoons as needed for belching or gas (other gas relieving medicines are also acceptable Beano, GasX, etc). d. Bowel Regulationmild laxatives are permissible such as Milk of Magnesia, Dulcolax (either by mouth or suppository). If you are accustomed to using a i0oddiwkrm laxative not mentioned, you may continue to use it. Fibercon tablets or Benefiber may be taken as a fiber supplement to regulate bowel movements. Fibercon tablets should be broken in half and taken in half and taken one half in the morning and one half in the evening. Benefiber (1 tsp.) can be added to your protein drink(s). It has no taste or added thickness. Imodium AD may be taken as needed for diarrhea. 
 
e. Pain medication-one to two every four hours as needed for pain control. If pain is mild, try extra-strength Tylenol first. 
 
f. Do not take any pain or arthritis medication such as Aspirin, Advil, Aleve, Naprosyn, or any other nonsteroidal anti-inflammatory medication unless approved by your surgeon. A Tylenol product is OK. 
 
g. Preadmission medications may be resumed, but this should be discussed without your doctor before your discharge from the hospital. 
 
Future Appointments Date Time Provider Deyanira Estrada 5/21/2018 10:40 AM EVA Jimenez SCHED  
6/4/2018 9:00 AM EVA Jimenez SCHED  
6/18/2018 9:00 AM EVA Jimenez SCHED  
7/3/2018 2:30 PM JON DEXA 1 [facility] KUO AUGUSTINE  
8/10/2018 9:30 AM Marlen Gomez MD RDE [address] [facility]! Dear Jackie Donnelly: Thank you for requesting a [employer] account. Our records indicate that you already have an active [employer] account. You can access your account anytime at [email or web address] Did you know that you can access your hospital and ER discharge instructions at any time in [employer]? You can also review all of your test results from your hospital stay or ER visit. Additional Information If you have questions, please visit the Frequently Asked Questions section of the [employer] website at [email or web address] Remember, [employer] is NOT to be used for urgent needs. For medical emergencies, dial 911. Now available from your iPhone and Android! Introducing Ruiz Burnett As a [employer] patient, I wanted to make you aware of our electronic visit tool called Ruiz Burnett. [employer] 24/7 allows you to connect within minutes with a medical provider 24 hours a day, seven days a week via a mobile device or tablet or logging into a secure website from your computer. You can access [employer] from anywhere in the [place]. A virtual visit might be right for you when you have a simple condition and feel like you just dont want to get out of bed, or cant get away from work for an appointment, when your regular [employer] provider is not available (evenings, weekends or holidays), or when youre out of town and need minor care. Electronic visits cost only $49 and if the [employer] 24/7 provider determines a prescription is needed to treat your condition, one can be electronically transmitted to a nearby pharmacy*. Please take a moment to enroll today if you have not already done so. The enrollment process is free and takes just a few minutes. To enroll, please download the [employer] 24/7 yony to your tablet or phone, or visit [email or web address] to enroll on your computer. And, as an [address] [place] [address] patient with a [employer] account, the results of your visits will be scanned into your electronic medical record and your primary care provider will be able to view the scanned results. We urge you to continue to see your regular [employer] provider for your ongoing medical care. And while your primary care provider may not be the one available when you seek a One[employer] virtual visit, the peace of mind you get from getting a real diagnosis real time can be priceless. For more information on One[employer], view our Frequently Asked Questions (FAQs) at [email or web address]. Sincerely, 
 
Lashay Goncalves MD 
Chief Medical Officer [employer] *:  certain medications cannot be prescribed via [employer] Providers Seen During Your Hospitalization Provider Specialty Primary office phone Emilie Ortez MD General Surgery [phone] Your Primary Care Physician (PCP) Primary Care Physician Office Phone Office Fax Tammie Lentz [phone] You are allergic to the following Allergen Reactions Codeine Hives Tolerates Dilaudid Recent Documentation Height Weight Breastfeeding? BMI OB Status Smoking Status 1.626 m 85.7 kg No 32.43 kg/m2 Hysterectomy Former Smoker Emergency Contacts Name Discharge Info Relation Home Work Mobile [address] CAREGIVER [3] Son [22] [phone] CAREGIVER [3] Sister [23] [phone] Cecilia Hoang CAREGIVER [3] Other Relative [6] [phone] Kirill Stafford  Child [2] [phone] Patient Belongings The following personal items are in your possession at time of discharge: 
  Dental Appliances: None  Visual Aid: Glasses, At bedside, With patient   Hearing Aids/Status: Does not own         Clothing: None (belongings sent to pacu) Please provide this summary of care documentation to your next provider. Signatures-by signing, you are acknowledging that this After Visit Summary has been reviewed with you and you have received a copy. Patient Signature:  ____________________________________________________________ Date:  ____________________________________________________________  
  
Carli Lomasterson Provider Signature:  ____________________________________________________________ Date:  ____________________________________________________________

## 2018-05-07 NOTE — PERIOP NOTES
Endoscope was pre-cleaned at bedside immediately following procedure by Mirela Pruitt lot # [id number].  (if applicable)

## 2018-05-07 NOTE — IP AVS SNAPSHOT
[place] [address] [place] [address] 
[phone] Patient: Trish Gonzalez MRN: [id number] COI:[id number] A check viktor indicates which time of day the medication should be taken. My Medications START taking these medications Instructions Each Dose to Equal  
 Morning Noon Evening Bedtime  
 oxyCODONE-acetaminophen 5-325 mg per tablet Commonly known as:  PERCOCET Your last dose was: Your next dose is: Take 1-2 Tabs by mouth every four (4) hours as needed. Max Daily Amount: 12 Tabs. 1-2 Tab CONTINUE taking these medications Instructions Each Dose to Equal  
 Morning Noon Evening Bedtime  
 calcium carbonate 600 mg calcium (1,500 mg) tablet Commonly known as:  Ainsley Sicard Your last dose was: Your next dose is: Take 1 Tab by mouth daily. 600 mg  
    
   
   
   
  
 cholecalciferol 1,000 unit tablet Commonly known as:  VITAMIN D3 Your last dose was: Your next dose is: Take 1 Tab by mouth daily. 1000 Units  
    
   
   
   
  
 esomeprazole 40 mg capsule Commonly known as:  Terrell Murillo Your last dose was: Your next dose is: Take 40 mg by mouth as needed. 40 mg  
    
   
   
   
  
 MELATONIN PO Your last dose was: Your next dose is: Take 5 mg by mouth nightly as needed. 5 mg  
    
   
   
   
  
 psyllium 0.52 gram capsule Commonly known as:  METAMUCIL Your last dose was: Your next dose is: Take 1 Cap by mouth daily. 1 Cap STOOL SOFTENER PO Your last dose was: Your next dose is: Take 1 Tab by mouth daily. 1 Tab VITAMIN B-12 1,000 mcg tablet Generic drug:  cyanocobalamin Your last dose was: Your next dose is: Take 1,000 mcg by mouth daily. 1000 mcg  
    
   
   
   
  
 WOMEN'S MULTIVITAMIN 18 mg iron-400 mcg-500 mg Tab Generic drug:  mv-mn-iron-FA-Ca carb-vit K Your last dose was: Your next dose is: Take 1 Tab by mouth daily. 1 Tab STOP taking these medications CARAFATE 1 gram tablet Generic drug:  sucralfate Where to Get Your Medications Information on where to get these meds will be given to you by the nurse or doctor. ! Ask your nurse or doctor about these medications  
  oxyCODONE-acetaminophen 5-325 mg per tablet

## 2018-05-07 NOTE — ROUTINE PROCESS
Bedside and Verbal shift change report given to [facility] (oncoming nurse) by Rebeka Bowman (offgoing nurse). Report included the following information SBAR, OR Summary, Intake/Output, Med Rec Status and Cardiac Rhythm NSR.

## 2018-05-08 ENCOUNTER — APPOINTMENT (OUTPATIENT)
Dept: GENERAL RADIOLOGY | Age: 63
DRG: 327 | End: 2018-05-08
Attending: SURGERY
Payer: COMMERCIAL

## 2018-05-08 LAB
ANION GAP SERPL CALC-SCNC: 8 MMOL/L (ref 5–15)
BASOPHILS # BLD: 0 K/UL (ref 0–0.1)
BASOPHILS NFR BLD: 0 % (ref 0–1)
BUN SERPL-MCNC: 9 MG/DL (ref 6–20)
BUN/CREAT SERPL: 17 (ref 12–20)
CALCIUM SERPL-MCNC: 8 MG/DL (ref 8.5–10.1)
CHLORIDE SERPL-SCNC: 104 MMOL/L (ref 97–108)
CO2 SERPL-SCNC: 25 MMOL/L (ref 21–32)
CREAT SERPL-MCNC: 0.54 MG/DL (ref 0.55–1.02)
DIFFERENTIAL METHOD BLD: ABNORMAL
EOSINOPHIL # BLD: 0 K/UL (ref 0–0.4)
EOSINOPHIL NFR BLD: 0 % (ref 0–7)
ERYTHROCYTE [DISTWIDTH] IN BLOOD BY AUTOMATED COUNT: 14.2 % (ref 11.5–14.5)
GLUCOSE SERPL-MCNC: 134 MG/DL (ref 65–100)
HCT VFR BLD AUTO: 31.9 % (ref 35–47)
HGB BLD-MCNC: 9.8 G/DL (ref 11.5–16)
IMM GRANULOCYTES # BLD: 0.1 K/UL (ref 0–0.04)
IMM GRANULOCYTES NFR BLD AUTO: 0 % (ref 0–0.5)
LYMPHOCYTES # BLD: 0.8 K/UL (ref 0.8–3.5)
LYMPHOCYTES NFR BLD: 4 % (ref 12–49)
MCH RBC QN AUTO: 26.1 PG (ref 26–34)
MCHC RBC AUTO-ENTMCNC: 30.7 G/DL (ref 30–36.5)
MCV RBC AUTO: 84.8 FL (ref 80–99)
MONOCYTES # BLD: 1.1 K/UL (ref 0–1)
MONOCYTES NFR BLD: 6 % (ref 5–13)
NEUTS SEG # BLD: 16.8 K/UL (ref 1.8–8)
NEUTS SEG NFR BLD: 89 % (ref 32–75)
NRBC # BLD: 0 K/UL (ref 0–0.01)
NRBC BLD-RTO: 0 PER 100 WBC
PLATELET # BLD AUTO: 247 K/UL (ref 150–400)
PMV BLD AUTO: 11.3 FL (ref 8.9–12.9)
POTASSIUM SERPL-SCNC: 4.2 MMOL/L (ref 3.5–5.1)
RBC # BLD AUTO: 3.76 M/UL (ref 3.8–5.2)
SODIUM SERPL-SCNC: 137 MMOL/L (ref 136–145)
WBC # BLD AUTO: 18.8 K/UL (ref 3.6–11)

## 2018-05-08 PROCEDURE — 74011636320 HC RX REV CODE- 636/320: Performed by: RADIOLOGY

## 2018-05-08 PROCEDURE — 74241 XR UPPER GI W KUB/ BA SWALLOW: CPT

## 2018-05-08 PROCEDURE — 65660000000 HC RM CCU STEPDOWN

## 2018-05-08 PROCEDURE — 80048 BASIC METABOLIC PNL TOTAL CA: CPT | Performed by: SURGERY

## 2018-05-08 PROCEDURE — 74011000250 HC RX REV CODE- 250: Performed by: SURGERY

## 2018-05-08 PROCEDURE — 74011000258 HC RX REV CODE- 258: Performed by: SURGERY

## 2018-05-08 PROCEDURE — 36415 COLL VENOUS BLD VENIPUNCTURE: CPT | Performed by: SURGERY

## 2018-05-08 PROCEDURE — 74011250637 HC RX REV CODE- 250/637: Performed by: SURGERY

## 2018-05-08 PROCEDURE — 85025 COMPLETE CBC W/AUTO DIFF WBC: CPT | Performed by: SURGERY

## 2018-05-08 PROCEDURE — 74011250636 HC RX REV CODE- 250/636: Performed by: SURGERY

## 2018-05-08 RX ORDER — OXYCODONE AND ACETAMINOPHEN 5; 325 MG/1; MG/1
1-2 TABLET ORAL
Status: DISCONTINUED | OUTPATIENT
Start: 2018-05-08 | End: 2018-05-09 | Stop reason: HOSPADM

## 2018-05-08 RX ORDER — SODIUM CHLORIDE, SODIUM LACTATE, POTASSIUM CHLORIDE, CALCIUM CHLORIDE 600; 310; 30; 20 MG/100ML; MG/100ML; MG/100ML; MG/100ML
125 INJECTION, SOLUTION INTRAVENOUS CONTINUOUS
Status: DISCONTINUED | OUTPATIENT
Start: 2018-05-08 | End: 2018-05-09 | Stop reason: HOSPADM

## 2018-05-08 RX ADMIN — HYDROMORPHONE HYDROCHLORIDE 1 MG: 1 INJECTION, SOLUTION INTRAMUSCULAR; INTRAVENOUS; SUBCUTANEOUS at 03:40

## 2018-05-08 RX ADMIN — ACETAMINOPHEN 1000 MG: 10 INJECTION, SOLUTION INTRAVENOUS at 02:46

## 2018-05-08 RX ADMIN — SODIUM CHLORIDE, SODIUM LACTATE, POTASSIUM CHLORIDE, AND CALCIUM CHLORIDE 125 ML/HR: 600; 310; 30; 20 INJECTION, SOLUTION INTRAVENOUS at 19:11

## 2018-05-08 RX ADMIN — HYDROMORPHONE HYDROCHLORIDE 1 MG: 1 INJECTION, SOLUTION INTRAMUSCULAR; INTRAVENOUS; SUBCUTANEOUS at 06:34

## 2018-05-08 RX ADMIN — OXYCODONE HYDROCHLORIDE AND ACETAMINOPHEN 1 TABLET: 5; 325 TABLET ORAL at 16:42

## 2018-05-08 RX ADMIN — ACETAMINOPHEN 1000 MG: 10 INJECTION, SOLUTION INTRAVENOUS at 08:45

## 2018-05-08 RX ADMIN — ENOXAPARIN SODIUM 40 MG: 40 INJECTION SUBCUTANEOUS at 08:45

## 2018-05-08 RX ADMIN — OXYCODONE HYDROCHLORIDE AND ACETAMINOPHEN 1 TABLET: 5; 325 TABLET ORAL at 12:30

## 2018-05-08 RX ADMIN — CEFOTETAN DISODIUM 2 G: 2 INJECTION, POWDER, FOR SOLUTION INTRAMUSCULAR; INTRAVENOUS at 03:40

## 2018-05-08 RX ADMIN — HYDROMORPHONE HYDROCHLORIDE 1 MG: 1 INJECTION, SOLUTION INTRAMUSCULAR; INTRAVENOUS; SUBCUTANEOUS at 00:50

## 2018-05-08 RX ADMIN — Medication 10 ML: at 22:00

## 2018-05-08 RX ADMIN — SODIUM CHLORIDE, SODIUM LACTATE, POTASSIUM CHLORIDE, AND CALCIUM CHLORIDE 125 ML/HR: 600; 310; 30; 20 INJECTION, SOLUTION INTRAVENOUS at 00:51

## 2018-05-08 RX ADMIN — OXYCODONE HYDROCHLORIDE AND ACETAMINOPHEN 1 TABLET: 5; 325 TABLET ORAL at 08:46

## 2018-05-08 RX ADMIN — HYDROMORPHONE HYDROCHLORIDE 1 MG: 1 INJECTION, SOLUTION INTRAMUSCULAR; INTRAVENOUS; SUBCUTANEOUS at 20:32

## 2018-05-08 RX ADMIN — IOHEXOL 50 ML: 350 INJECTION, SOLUTION INTRAVENOUS at 08:04

## 2018-05-08 NOTE — PROGRESS NOTES
Bedside and Verbal shift change report given to Bea (oncoming nurse) by Sanjiv Mckeon (offgoing nurse).  Report included the following information SBAR, OR Summary, Procedure Summary, Intake/Output, MAR, Accordion, Recent Results, Med Rec Status and Cardiac Rhythm normal.

## 2018-05-08 NOTE — ROUTINE PROCESS
Bedside shift change report given to Shira Modi (oncoming nurse) by Dietra Schilder (offgoing nurse). Report included the following information SBAR, ED Summary, Intake/Output, Med Rec Status and Cardiac Rhythm NSR.

## 2018-05-08 NOTE — PROGRESS NOTES
Spiritual Care Partner Volunteer visited patient in room 437 on 5/08/18. Documented by: : Rev. Ferdinand Nunes.  Isis Alan; [facility], to contact [phone] [address] call: [phone]

## 2018-05-08 NOTE — PROGRESS NOTES
Progress Note    Patient: Joanna Lovett MRN: [id number]  SSN: [id number]    YOB: 1955  Age: 58 y.o. Sex: female      Admit Date: 2018    1 Day Post-Op    Procedure:  Procedure(s):  LAPAROSCOPIC RESECTION GASTRIC POUCH, REVISION GASTROJEUNOSTOMY, LAPAROSCOPIC NISSEN FUNDOPLICATION AND HIATAL HERNIA REPAIR  ESOPHAGOGASTRODUODENOSCOPY (EGD)    Subjective:     Patient has good pain and nausea control this AM; no GERD symptoms; ambulating. Objective:     Visit Vitals    /59    Pulse 71    Temp 97.3 °F (36.3 °C)    Resp 18    Wt 184 lb 15.5 oz (83.9 kg)    SpO2 94%    BMI 31.75 kg/m2       Date 18 - 18 - 18 0659   Shift 0279-9248 8865-0625 24 Hour Total 6402-3852 7907-4471 24 Hour Total   I  N  T  A  K  E   P.O.  100 100         P. O.  100 100       I.V. 1700 1229.2  (1.2) 2929.2  (1.5)         Volume (lactated Ringers infusion) 1700  1700         Volume (lactated Ringers infusion)  1029.2 1029.2         Volume (acetaminophen (OFIRMEV) infusion 1,000 mg)  200 200       Shift Total  (mL/kg) 1700 1329.2  (15.8) 3029.2  (36.1)      O  U  T  P  U  T   Urine 325  325  (0.2)         Urine Occurrence(s)  2 x 2 x         Urine Output 325  325       Drains 20 30 50         Output (ml) (Jaswinder-Carrillo Drain 18 Right;Upper Abdomen) 20 30 50       Blood 50  50         Estimated Blood Loss 50  50       Shift Total  (mL/kg) 395 30  (0.4) 425  (5.1)      NET 1305 1299.2 2604.2      Weight (kg)  83.9 83.9 83.9 83.9 83.9       Temp (24hrs), Av.7 °F (36.5 °C), Min:97.3 °F (36.3 °C), Max:98.1 °F (36.7 °C)      Physical Exam:    LUNG: diminished breath sounds R base, L base, HEART: regular rate and rhythm, S1, S2 normal, no murmur. ABDOMEN: Non-distended, soft; wounds dry and intact; sero-sanguinous drain fluid; appropriate incisional pain with palpation.     Data Review: VS, I/O's, Labs, UGI (no leak or obstruction)    Lab Review:   Recent Results (from the past 12 hour(s))   METABOLIC PANEL, BASIC    Collection Time: 18  2:53 AM   Result Value Ref Range    Sodium 137 136 - 145 mmol/L    Potassium 4.2 3.5 - 5.1 mmol/L    Chloride 104 97 - 108 mmol/L    CO2 25 21 - 32 mmol/L    Anion gap 8 5 - 15 mmol/L    Glucose 134 (H) 65 - 100 mg/dL    BUN 9 6 - 20 MG/DL    Creatinine 0.54 (L) 0.55 - 1.02 MG/DL    BUN/Creatinine ratio 17 12 - 20      GFR est AA >60 >60 ml/min/1.73m2    GFR est non-AA >60 >60 ml/min/1.73m2    Calcium 8.0 (L) 8.5 - 10.1 MG/DL   CBC WITH AUTOMATED DIFF    Collection Time: 18  2:53 AM   Result Value Ref Range    WBC 18.8 (H) 3.6 - 11.0 K/uL    RBC 3.76 (L) 3.80 - 5.20 M/uL    HGB 9.8 (L) 11.5 - 16.0 g/dL    HCT 31.9 (L) 35.0 - 47.0 %    MCV 84.8 80.0 - 99.0 FL    MCH 26.1 26.0 - 34.0 PG    MCHC 30.7 30.0 - 36.5 g/dL    RDW 14.2 11.5 - 14.5 %    PLATELET 965 367 - 850 K/uL    MPV 11.3 8.9 - 12.9 FL    NRBC 0.0 0  WBC    ABSOLUTE NRBC 0.00 0.00 - 0.01 K/uL    NEUTROPHILS 89 (H) 32 - 75 %    LYMPHOCYTES 4 (L) 12 - 49 %    MONOCYTES 6 5 - 13 %    EOSINOPHILS 0 0 - 7 %    BASOPHILS 0 0 - 1 %    IMMATURE GRANULOCYTES 0 0.0 - 0.5 %    ABS. NEUTROPHILS 16.8 (H) 1.8 - 8.0 K/UL    ABS. LYMPHOCYTES 0.8 0.8 - 3.5 K/UL    ABS. MONOCYTES 1.1 (H) 0.0 - 1.0 K/UL    ABS. EOSINOPHILS 0.0 0.0 - 0.4 K/UL    ABS. BASOPHILS 0.0 0.0 - 0.1 K/UL    ABS. IMM. GRANS. 0.1 (H) 0.00 - 0.04 K/UL    DF AUTOMATED           Assessment:     Hospital Problems  Date Reviewed: 2018          Codes Class Noted POA    Gastroesophageal reflux disease without esophagitis ICD-10-CM: K21.9  ICD-9-CM: 530.81  2018 Yes              Plan/Recommendations/Medical Decision Makin. Bariatric liquids. Goal intake is 4 oz/hr. 2. Ambulation, increase spirometry. 3. Oral analgesics. 4. DVT prophylaxis.     Signed By: Manuela Fallon MD     May 8, 2018

## 2018-05-08 NOTE — OP NOTES
[address]  OPERATIVE REPORT    Hanny TITUS  MR#: [id number]  : 1955  ACCOUNT #: [de-identified]   DATE OF SERVICE: 2018    PREOPERATIVE DIAGNOSIS:  Gastroesophageal reflux disease, regurgitation following open gastric bypass. POSTOPERATIVE DIAGNOSIS:  Gastroesophageal reflux disease, regurgitation following open gastric bypass. PROCEDURES PERFORMED:  1. Laparoscopic resection and reconstruction of gastrojejunostomy (CPT 06728). 2.  Hiatal hernia repair. 3.  Intraoperative upper endoscopy. ATTENDING SURGEON:  Mitchell Bishop MD     ASSISTANT:  VARGHESE Oconnor    ANESTHESIA:  General endotracheal.    DRAINS:  A 19 mm Breezy drain. COUNTS:  Sponge count correct. Needle count correct. SPECIMENS REMOVED:  1. Anastomotic donuts. 2.  Segment of small intestine. 3.  Subtotal gastrectomy with gastrojejunostomy. IMPLANTS:  None. ESTIMATED BLOOD LOSS:  100 mL. COMPLICATIONS:  None. INDICATIONS:  The patient is a 61-year-old white female with a history of open gastric bypass in  by Dr. Patrick Tompkins. While she experienced satisfactory weight loss results, she has developed progressive gastroesophageal reflux disease symptoms, with regurgitation of bilious fluid, despite maximal medical management. Upper endoscopy reveals an irregular Z line without esophagitis. Manometry reveals normal LES with normal esophageal peristaltic function. Upper gastrointestinal series reveals an enlarged gastric pouch with an enlarged gastrojejunostomy and spontaneous reflux. As she has failed medical management, she is taken to the operating room today for revision of gastrojejunostomy. I have asked VARGHESE Oconnor, to assist with the procedure given the technical complexity of revisional laparoscopic bariatric surgery.   She will run the laparoscope and assist in the dissection, identification of the gastrojejunostomy, its resection and reconstruction of a new gastrojejunostomy. FINDINGS:  1. Dense upper abdominal adhesions, lysed. 2.  Enlarged gastric pouch. 3.  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 4.  Truncation of pouch, with resection of distal pouch in continuity with excluded stomach and proximal Polo limb. 5.  Hiatal hernia repair through a cruroplasty. 6.  Stapled end-to-side 25 EEA gastrojejunostomy. 7.  No air leak or hemorrhage on upper endoscopy. DESCRIPTION OF PROCEDURE:  The patient was identified as the correct patient in the preoperative holding area and informed consent was confirmed. After answering the patient's remaining questions, she was taken to the operating room and placed on the operating room table in the supine position. Sequential compression devices were placed on both lower extremities. Following the uneventful initiation of general anesthesia, a Golden catheter was placed within her bladder, and she was carefully secured to the operating room table with footboard and safety strap in place. All potential pressure points were padded with egg crate. Her abdomen was prepped and draped in the usual sterile fashion. Final time-out was performed, and it was confirmed she had received intravenous antibiotics. A 5 mm trocar was inserted through a small right-sided skin incision using an Optiview technique. After confirming intraperitoneal location of the trocar tip, insufflation with carbon dioxide gas was initiated. Once adequate working space had been developed, the 5 mm, 30-degree laparoscope was inserted. No signs of trocar injury were present. Adhesions between the omentum, the transverse colon and the anterior abdominal wall in the area of the falciform ligament were identified. A 12 mm right upper quadrant trocar was inserted through a small skin incision using an Optiview technique.   Lysis of adhesions was initiated using combination of the blunt dissection and the Harmonic scalpel. As the omentum and transverse colon were freed from the anterior abdominal wall, the patient was placed in the steep reverse Trendelenburg position. Two additional trocars, one 5 mm and the other 15 mm, were inserted using visual guidance with the laparoscope. Additional upper abdominal adhesions were taken down using identical technique, exposing the left lobe of the liver. As the dissection was continued, the excluded stomach and antrum were identified. A plane was developed between the undersurface of the left lobe of the liver and the antrum using a combination of the Harmonic scalpel and blunt dissection. As this plane was continued cephalad, the area of the gastric pouch was identified, followed by identification of the hiatus with identification of a hiatal hernia. The dissection was carried into the mediastinum, freeing the hernia sac from the right and left crura using the Harmonic scalpel and blunt dissection. Despite the presence of the hiatal hernia, the gastroesophageal junction was noted to lie within the abdominal space. Adhesions along the left marcus of the diaphragm were freed, freeing the angle of His from the diaphragm along the excluded stomach. Inferiorly, the retrocolic, antegastric Polo limb was identified, and its blood supply was noted to be densely adherent to surrounding omentum. Some of these adhesions were taken down using the Harmonic scalpel. The antimesenteric border of the Polo limb was freed from surrounding omental adhesions using identical technique. This was carried caudally to the level of the mesocolon. Additional adhesions between the bowel and the mesocolon were taken down to improve mobility of the Polo limb, with anticipation of later gastrojejunostomy. A plane was developed posterior to the efferent segment of the Polo limb, taking down posterior attachments.   The dissection was then continued between the pouch and the gastric remnant, with the dissection beginning adjacent to the left marcus of the diaphragm and continuing caudally, freeing these 2 structures. Multiple clips were identified along the staple line of both the gastric pouch and the excluded stomach. As the dissection was carried caudally, chronic inflammatory changes were identified at the level of the distal gastric pouch, highly suggestive of gastrogastric fistula. As no plane could be developed between the distal pouch and the remnant, the decision was made to resect the pouch above this level of inflammation, with resection of the   distal stomach and division of the Polo limb just below the gastrojejunostomy. A plane was developed posterior to the efferent segment of the Polo limb below the gastrojejunostomy. The Polo limb was divided with 2 firings of a tan load linear stapler. Omental adhesions and blood supply were then divided using the Harmonic scalpel. The gastrocolic ligament was incised along the greater curve of the remnant, and this dissection was carried along the greater curve of the stomach to the level of the left marcus of the diaphragm. This included takedown of the short gastric vessels and retrogastric attachments. Takedown of greater curve attachments was then performed medial to the Polo limb, to the antrum. The antrum was then divided with 2 black load linear stapler firings. The lesser curve of the pouch was then opened using a Harmonic scalpel and blunt dissection. A plane was developed posteriorly along the pouch superior to the area of the gastrogastric fistula. The pouch was then divided with multiple purple load linear stapler firings using SeamGuard reinforcement material.  The remaining attachments of the remnant were taken down using the Harmonic scalpel, and the distal stomach was placed in the right upper quadrant for later retrieval.  The pouch was inspected and its blood supply was noted to be adequate.   No signs of injury to the viscera were present. A 25 EEA stapler anvil attached to a nasogastric tube was passed transorally, such that the tube tip was positioned 1 cm anterior to the transverse pouch staple line. Electrocautery was used to make an opening at the site, and the tube was delivered into the abdominal space. The tube was then used to deliver the anvil within the gastric pouch, with Anesthesia Staff providing jaw thrust.  The tube was then  from the anvil and removed from the patient's body. Additional dissection of the efferent segment of the Polo limb was performed, freeing the bowel from surrounding attachments and preserving its blood supply. Once adequate mobility had been achieved, the Harmonic scalpel was used to make an opening in the Polo limb adjacent to the staple line, through which a 25 EEA stapler was passed intraluminally to a healthy site of bowel. The stapler spike was deployed through the antimesenteric border of the Polo limb and mated with the anvil within the gastric pouch. The stapler was then slowly and carefully closed, with care to avoid twisting of the pouch or incorporation of extraneous tissue. After confirming correct stapler closure, it was fired and removed. Both proximal and distal donuts were noted to be intact. The open, redundant portion of the Polo limb was amputated with a firing of a tan load linear stapler. This segment of bowel was placed within a retrieval bag and removed from the patient's body. Once pneumoperitoneum had been reestablished, tension-relieving sutures between the antimesenteric border of the Polo limb and the gastric pouch were placed using 0 Surgidac suture. An intestinal clamp was placed on the Polo limb distal to the new gastrojejunal anastomosis. The patient was placed in the supine position. Sterile saline was instilled into the upper abdomen. Intraoperative upper endoscopy was performed.   The gastroscope was passed transorally, through the gastroesophageal junction and into the gastric pouch. With insufflation using the gastroscope, adequate pouch and Polo limb distention were noted. No insufflation air leak occurred. No intraluminal hemorrhage was identified. The anastomosis was noted to be hemostatic and patent. The gastric pouch was decompressed and the gastroscope was removed. Sterile saline was evacuated from the upper abdomen. A 19 mm Breezy drain was inserted into the abdominal space. It was allowed to lie adjacent to the gastrojejunal anastomosis and brought out through the right subcostal 5 mm trocar balloon. It was secured to the skin with a 2-0 nylon suture. The distal gastrectomy specimen was   brought out through the left upper quadrant 15 mm wound, with simultaneous removal of the wound protector. After confirming adequate hemostasis, the left upper quadrant 15 mm fascial defect was closed with a series of 0 Vicryl sutures using a laparoscopic suture passer. The right upper quadrant 12 mm fascial site was closed using identical technique. Pneumoperitoneum was released, and all trocars were removed. All wounds were infiltrated with 0.5% Marcaine without epinephrine. All skin edges were reapproximated with a combination of subcuticular 4-0 Monocryl suture and Dermabond. The patient tolerated the procedure well. She was extubated in the operating room and transported to the recovery area in stable condition. The attending surgeon, Dr. Shaila Lomas, was scrubbed and present for the entire procedure.       Tatiana Briggs MD       [address] [facility] [place] [facility]\"" / [facility]  D: 05/07/2018 20:47     T: 05/08/2018 08:56  JOB #: [id number]

## 2018-05-08 NOTE — PROGRESS NOTES
Problem: Patient Education: Go to Patient Education Activity  Goal: Patient/Family Education  Outcome: Resolved/Met Date Met: 05/08/18  NUTRITION     Chart reviewed. Post-op bariatric diet instruction completed. Will gladly follow up for additional questions as needed. Thank you.      Michael Lovell RD

## 2018-05-09 VITALS
DIASTOLIC BLOOD PRESSURE: 58 MMHG | SYSTOLIC BLOOD PRESSURE: 143 MMHG | WEIGHT: 188.93 LBS | OXYGEN SATURATION: 93 % | HEIGHT: 64 IN | HEART RATE: 83 BPM | RESPIRATION RATE: 16 BRPM | TEMPERATURE: 98.9 F | BODY MASS INDEX: 32.26 KG/M2

## 2018-05-09 LAB
ANION GAP SERPL CALC-SCNC: 10 MMOL/L (ref 5–15)
BASOPHILS # BLD: 0.1 K/UL (ref 0–0.1)
BASOPHILS NFR BLD: 0 % (ref 0–1)
BUN SERPL-MCNC: 7 MG/DL (ref 6–20)
BUN/CREAT SERPL: 16 (ref 12–20)
CALCIUM SERPL-MCNC: 8.5 MG/DL (ref 8.5–10.1)
CHLORIDE SERPL-SCNC: 104 MMOL/L (ref 97–108)
CO2 SERPL-SCNC: 26 MMOL/L (ref 21–32)
CREAT SERPL-MCNC: 0.44 MG/DL (ref 0.55–1.02)
DIFFERENTIAL METHOD BLD: ABNORMAL
EOSINOPHIL # BLD: 0.2 K/UL (ref 0–0.4)
EOSINOPHIL NFR BLD: 1 % (ref 0–7)
ERYTHROCYTE [DISTWIDTH] IN BLOOD BY AUTOMATED COUNT: 14.6 % (ref 11.5–14.5)
GLUCOSE SERPL-MCNC: 107 MG/DL (ref 65–100)
HCT VFR BLD AUTO: 32.6 % (ref 35–47)
HGB BLD-MCNC: 9.9 G/DL (ref 11.5–16)
IMM GRANULOCYTES # BLD: 0.1 K/UL (ref 0–0.04)
IMM GRANULOCYTES NFR BLD AUTO: 0 % (ref 0–0.5)
LYMPHOCYTES # BLD: 0.9 K/UL (ref 0.8–3.5)
LYMPHOCYTES NFR BLD: 7 % (ref 12–49)
MCH RBC QN AUTO: 25.8 PG (ref 26–34)
MCHC RBC AUTO-ENTMCNC: 30.4 G/DL (ref 30–36.5)
MCV RBC AUTO: 84.9 FL (ref 80–99)
MONOCYTES # BLD: 0.7 K/UL (ref 0–1)
MONOCYTES NFR BLD: 5 % (ref 5–13)
NEUTS SEG # BLD: 11.6 K/UL (ref 1.8–8)
NEUTS SEG NFR BLD: 86 % (ref 32–75)
NRBC # BLD: 0 K/UL (ref 0–0.01)
NRBC BLD-RTO: 0 PER 100 WBC
PLATELET # BLD AUTO: 265 K/UL (ref 150–400)
PMV BLD AUTO: 12.5 FL (ref 8.9–12.9)
POTASSIUM SERPL-SCNC: 3.7 MMOL/L (ref 3.5–5.1)
RBC # BLD AUTO: 3.84 M/UL (ref 3.8–5.2)
SODIUM SERPL-SCNC: 140 MMOL/L (ref 136–145)
WBC # BLD AUTO: 13.5 K/UL (ref 3.6–11)

## 2018-05-09 PROCEDURE — 85025 COMPLETE CBC W/AUTO DIFF WBC: CPT | Performed by: SURGERY

## 2018-05-09 PROCEDURE — 74011250637 HC RX REV CODE- 250/637: Performed by: SURGERY

## 2018-05-09 PROCEDURE — 74011250636 HC RX REV CODE- 250/636: Performed by: SURGERY

## 2018-05-09 PROCEDURE — 80048 BASIC METABOLIC PNL TOTAL CA: CPT | Performed by: SURGERY

## 2018-05-09 PROCEDURE — 36415 COLL VENOUS BLD VENIPUNCTURE: CPT | Performed by: SURGERY

## 2018-05-09 RX ORDER — OXYCODONE AND ACETAMINOPHEN 5; 325 MG/1; MG/1
1-2 TABLET ORAL
Qty: 30 TAB | Refills: 0 | Status: SHIPPED | OUTPATIENT
Start: 2018-05-09 | End: 2018-05-21 | Stop reason: ALTCHOICE

## 2018-05-09 RX ADMIN — OXYCODONE HYDROCHLORIDE AND ACETAMINOPHEN 1 TABLET: 5; 325 TABLET ORAL at 09:33

## 2018-05-09 RX ADMIN — HYDROMORPHONE HYDROCHLORIDE 1 MG: 1 INJECTION, SOLUTION INTRAMUSCULAR; INTRAVENOUS; SUBCUTANEOUS at 00:03

## 2018-05-09 RX ADMIN — LORAZEPAM 1 MG: 2 INJECTION INTRAMUSCULAR; INTRAVENOUS at 06:50

## 2018-05-09 RX ADMIN — Medication 10 ML: at 06:00

## 2018-05-09 RX ADMIN — ENOXAPARIN SODIUM 40 MG: 40 INJECTION SUBCUTANEOUS at 09:32

## 2018-05-09 RX ADMIN — HYDROMORPHONE HYDROCHLORIDE 1 MG: 1 INJECTION, SOLUTION INTRAMUSCULAR; INTRAVENOUS; SUBCUTANEOUS at 06:51

## 2018-05-09 RX ADMIN — HYDROMORPHONE HYDROCHLORIDE 1 MG: 1 INJECTION, SOLUTION INTRAMUSCULAR; INTRAVENOUS; SUBCUTANEOUS at 03:20

## 2018-05-09 NOTE — DISCHARGE INSTRUCTIONS
Gastric Bypass  Patient Discharge Instructions  General Surgery at [facility]  [phone]    You have a 2 week follow up in place    Future Appointments  Date Time Provider Deyanira Estrada   5/21/2018 10:40 AM EVA Hong   6/4/2018 9:00 AM EVA Hong   6/18/2018 9:00 AM EVA Hong                       1. Activities: You may be active walking, stair climbing, and doing light weight activities with one to two-pound weights, sitting in a chair using different arm motions. Major restrictions include driving until your first office visit and lifting anything heavier than ten pounds. It is very important that you walk frequently. In addition to walking, you should continue to use your incentive spirometer (breathing exercises) throughout the day. 2. Caring for vour incision (s}: Your surgical incision(s) will be covered with Derma bond (super glue). You may shower as desired and simply pat dry the area over the incision(s). There may occasionally be seepage of yellowish to yellowish-maroon dissolved fat from your wound, which is of no major concern as long as the wound is not red, hardened in the area of the drainage, or if the drainage has a foul smell. If there are any questions, call our office for further instructions. If there is this type of dissolved fat drainage,  the shower and gently express the fluid from your wound. Then keep gauze pads over the area to protect both the wound and your clothes. 3. When to call the office immediately:      *Chest pain (not associated with eating/drinking)  *Shortness of breath (more than normal)  *Sudden pain and/or redness in calf  *Fever greater than 101F  *Persistent nausea and/or vomiting (unable to keep down any liquids)  *Bleeding from incision(s)  *Severe abdominal pain  *Any other concerning symptoms    4.  Diet: There are three main priorities as far as your diet is concerned---    a. Clear Liquids-drink from 1 oz. cups or standard shot glass. These liquids are non-carbonated, no added sugar, and not irritating to your stomach. They may include water, tea, coffee\" 100% no added sugar juices (avoid citrus) , clear broth, blenderized clear soups such as [employer] Healthy Request Chicken Noodle and Chicken & Rice, Sugar-free products including popsicles, Dionte-Aid, and Crystal Lite. b. Vitamins: Multi-vitamin with iron in the morning and evening, making sure  it's not the first thing or the last thing taken. The other Vitamin B-12 and  vitamin A & D capsule may be taken once during the day anytime that you  can make a routine of it.    c. Protein Intake: During your initial two-three weeks when your body is switching from burning glucose to directly burning fat, there is an attempt by your body to use protein as a fuel. To protect that protein, we like you to exercise as listed above, and to try to take in 50-60 grams of protein per day. This can be done with four 8 oz. serving of skim milk and Low Carb [place] Instant Breakfast. Each 8 oz. should be considered a meal-breakfast, lunch, or supper, and during this mealtime it should be the sole ingested substance. Stop your clear liquids for 20 minutes before your start on the instant breakfast, which is sipped 1 oz. at a time. You may also try the following substitute for [place] Instant Breakfast: skim milk-fat free powdered milk + Egg Beaters + flavor extract. If desired, ice may be added and blenderized in the . If the milk upsets your stomach, you may purchase Lactaid tablets from any drug store. Lactaid skim milk may be purchased at most major supermarkets. Another alternative is Boost Diabetic, which is Lactose-free and ready to drink. There are many protein supplements on the market. Whey and Soy based protein powders/drinks are acceptable.  If you have any questions about specific products please call the office. If you are having difficulty with your diet, please call the office. 5. Medications: Take no more than 2 pills at a time. Wait 20 minutes between pills. a. Vitamins as listed above---multi-vitamin with iron twice a day, B-12 once a day, vitamin A& D once a day. b. Acid reducing medicine--Will be prescribed by your surgeon at discharge. c. Mylanta Plus--one to two teaspoons as needed for belching or gas (other gas relieving medicines are also acceptable Beano, GasX, etc). d. Bowel Regulation--mild laxatives are permissible such as Milk of Magnesia, Dulcolax (either by mouth or suppository). If you are accustomed to using a [id number] laxative not mentioned, you may continue to use it. Fibercon tablets or Benefiber may be taken as a fiber supplement to regulate bowel movements. Fibercon tablets should be broken in half and taken in half and taken one half in the morning and one half in the evening. Benefiber (1 tsp.) can be added to your protein drink(s). It has no taste or added thickness. Imodium AD may be taken as needed for diarrhea.    e. Pain medication-one to two every four hours as needed for pain control. If pain is mild, try extra-strength Tylenol first.    f. Do not take any pain or arthritis medication such as Aspirin, Advil, Aleve, Naprosyn, or any other nonsteroidal anti-inflammatory medication unless approved by your surgeon.      A Tylenol product is OK.    g. Preadmission medications may be resumed, but this should be discussed without your doctor before your discharge from the hospital.    Future Appointments  Date Time Provider Deyanira Estrada   5/21/2018 10:40 AM EVA Nair   6/4/2018 9:00 AM EVA Nair   6/18/2018 9:00 AM EVA Nair   7/3/2018 2:30 PM JON ROSAS 1 YURY BRADSHAW   8/10/2018 9:30 AM Denton Mcconnell MD RDE [id number] [facility]

## 2018-05-09 NOTE — ROUTINE PROCESS
Bedside and Verbal shift change report given to Joaquina Kenyon (oncoming nurse) by Dian Garcia (offgoing nurse). Report included the following information SBAR, Procedure Summary, Recent Results and Cardiac Rhythm NSR.

## 2018-05-09 NOTE — DISCHARGE SUMMARY
Physician Discharge Summary     Patient ID:  Trish Gonzalez  [id number]  89 y.o.  1955    Allergies: Codeine    Admit Date: 5/7/2018    Discharge Date: 5/9/2018    * Admission Diagnoses: HIATAL HERNIA, REFLUX ESOPHAGITIS;Gastroesophageal reflux d*    * Discharge Diagnoses:    Hospital Problems as of 5/9/2018  Date Reviewed: 5/7/2018          Codes Class Noted - Resolved POA    Gastroesophageal reflux disease without esophagitis ICD-10-CM: K21.9  ICD-9-CM: 530.81  2/7/2018 - Present Yes               Admission Condition: Good    * Discharge Condition: good    * Procedures: Procedure(s):  LAPAROSCOPIC RESECTION GASTRIC POUCH, REVISION GASTROJEUNOSTOMY, LAPAROSCOPIC NISSEN FUNDOPLICATION AND HIATAL HERNIA REPAIR  ESOPHAGOGASTRODUODENOSCOPY (EGD)    * Hospital Course:   Normal hospital course for this procedure. Normal POD#1 UGI series, good tolerance of bariatric liquid diet. Consults: None    Significant Diagnostic Studies: radiology: X-Ray: UGI without leak or obstruction    * Disposition: Home    Discharge Medications:   Discharge Medication List as of 5/9/2018  9:28 AM      START taking these medications    Details   oxyCODONE-acetaminophen (PERCOCET) 5-325 mg per tablet Take 1-2 Tabs by mouth every four (4) hours as needed. Max Daily Amount: 12 Tabs., Print, Disp-30 Tab, R-0         CONTINUE these medications which have NOT CHANGED    Details   mv-mn-iron-FA-Ca carb-vit K (WOMEN'S MULTIVITAMIN) 18 mg iron-400 mcg-500 mg tab Take 1 Tab by mouth daily. , Historical Med      esomeprazole (NEXIUM) 40 mg capsule Take 40 mg by mouth as needed., Historical Med      DOCUSATE SODIUM (STOOL SOFTENER PO) Take 1 Tab by mouth daily. , Historical Med      cholecalciferol (VITAMIN D3) 1,000 unit tablet Take 1 Tab by mouth daily. , Normal, Disp-90 Tab, R-3      calcium carbonate (CALTREX) 600 mg calcium (1,500 mg) tablet Take 1 Tab by mouth daily. , Normal, Disp-90 Tab, R-3      psyllium (METAMUCIL) 0.52 gram capsule Take 1 Cap by mouth daily. , Normal, Disp-90 Cap, R-3      MELATONIN PO Take 5 mg by mouth nightly as needed., Historical Med      cyanocobalamin (VITAMIN B-12) 1,000 mcg tablet Take 1,000 mcg by mouth daily. , Historical Med         STOP taking these medications       sucralfate (CARAFATE) 1 gram tablet Comments:   Reason for Stopping:               * Follow-up Care/Patient Instructions:   Activity: No heavy lifting, pushing, pulling x 4 weeks  Diet: full liquids  Wound Care: Keep wounds clean and dry    Follow-up Information     Follow up With Details Comments 1 [facility] Virgilio Lincoln MD   [address] [facility]  [address] [address] [facility] [id number] [phone]           Future Appointments  Date Time Provider Deyanira Estrada   5/21/2018 10:40 AM EVA Delacruz KYRA SCHED   6/4/2018 9:00 AM EVA Delacruz SCHED   6/18/2018 9:00 AM Natalia Salcido NP RICHARD KYRA SCHED   7/3/2018 2:30 PM JON DEXA 1 SMHRRDEXTONIE KUO AUGUSTINE   8/10/2018 9:30 AM Devon Fonseca MD RDE [address] [facility]         Signed:  Paul Lacy MD  5/9/2018  3:13 PM

## 2018-05-09 NOTE — PROGRESS NOTES
0159 patient given d/c instructions; follow up appointment reviewed; exit survey retained; support group on line group discussed;   Sister picking up patient,  Shobha Vásquez, RN

## 2018-05-09 NOTE — PROGRESS NOTES
Progress Note    Patient: Issac Mosqueda MRN: [id number]  SSN: [id number]    YOB: 1955  Age: 58 y.o. Sex: female      Admit Date: 2018    2 Days Post-Op    Procedure:  Procedure(s):  LAPAROSCOPIC RESECTION GASTRIC POUCH, REVISION GASTROJEUNOSTOMY, LAPAROSCOPIC NISSEN FUNDOPLICATION AND HIATAL HERNIA REPAIR  ESOPHAGOGASTRODUODENOSCOPY (EGD)    Subjective:     Patient has good pain and nausea control; no GERD symptoms; ambulating. She is tolerating 4 oz/hr bariatric liquids. Objective:     Visit Vitals    /58 (BP 1 Location: Right arm, BP Patient Position: Sitting)    Pulse 87    Temp 98.9 °F (37.2 °C)    Resp 16    Ht 5' 4\" (1.626 m)    Wt 188 lb 15 oz (85.7 kg)    SpO2 93%    Breastfeeding No    BMI 32.43 kg/m2       Date 18 - 18 - 05/10/18 0659   Shift [id number] 24 Hour Total 6160-8385 5802-2063 24 Hour Total   I  N  T  A  K  E   P. O. 540  540         P. O. 540  540       I.V.  (mL/kg/hr) 1543.7  (1.5) 1164.6  (1.1) 2708.3  (1.3)         Volume (lactated Ringers infusion) 1543. 7  1543.7         Volume (lactated Ringers infusion)  1164.6 1164.6       Shift Total  (mL/kg) 2083.7  (24.8) 1164.6  (13.6) 3248.3  (37.9)      O  U  T  P  U  T   Urine  (mL/kg/hr)            Urine Occurrence(s) 2 x  2 x       Drains 35 10 45         Output (ml) (Jaswinder-Carrillo Drain 18 Right;Upper Abdomen) 35 10 45       Shift Total  (mL/kg) 35  (0.4) 10  (0.1) 45  (0.5)      NET 2048. 7 1154.6 3203.3      Weight (kg) 83.9 85.7 85.7 85.7 85.7 85.7       Temp (24hrs), Av.5 °F (36.9 °C), Min:98.1 °F (36.7 °C), Max:98.9 °F (37.2 °C)      Physical Exam:    ABDOMEN: Non-distended, soft; wounds dry and intact (ecchymosis at LUQ 15 mm incision); sero-sanguinous drain fluid; appropriate incisional pain with palpation.     Data Review: VS, I/O's, Labs    Lab Review:   Recent Results (from the past 12 hour(s))   CBC WITH AUTOMATED DIFF    Collection Time: 18  3:10 AM   Result Value Ref Range    WBC 13.5 (H) 3.6 - 11.0 K/uL    RBC 3.84 3.80 - 5.20 M/uL    HGB 9.9 (L) 11.5 - 16.0 g/dL    HCT 32.6 (L) 35.0 - 47.0 %    MCV 84.9 80.0 - 99.0 FL    MCH 25.8 (L) 26.0 - 34.0 PG    MCHC 30.4 30.0 - 36.5 g/dL    RDW 14.6 (H) 11.5 - 14.5 %    PLATELET 879 215 - 204 K/uL    MPV 12.5 8.9 - 12.9 FL    NRBC 0.0 0  WBC    ABSOLUTE NRBC 0.00 0.00 - 0.01 K/uL    NEUTROPHILS 86 (H) 32 - 75 %    LYMPHOCYTES 7 (L) 12 - 49 %    MONOCYTES 5 5 - 13 %    EOSINOPHILS 1 0 - 7 %    BASOPHILS 0 0 - 1 %    IMMATURE GRANULOCYTES 0 0.0 - 0.5 %    ABS. NEUTROPHILS 11.6 (H) 1.8 - 8.0 K/UL    ABS. LYMPHOCYTES 0.9 0.8 - 3.5 K/UL    ABS. MONOCYTES 0.7 0.0 - 1.0 K/UL    ABS. EOSINOPHILS 0.2 0.0 - 0.4 K/UL    ABS. BASOPHILS 0.1 0.0 - 0.1 K/UL    ABS. IMM. GRANS. 0.1 (H) 0.00 - 0.04 K/UL    DF AUTOMATED     METABOLIC PANEL, BASIC    Collection Time: 18  3:10 AM   Result Value Ref Range    Sodium 140 136 - 145 mmol/L    Potassium 3.7 3.5 - 5.1 mmol/L    Chloride 104 97 - 108 mmol/L    CO2 26 21 - 32 mmol/L    Anion gap 10 5 - 15 mmol/L    Glucose 107 (H) 65 - 100 mg/dL    BUN 7 6 - 20 MG/DL    Creatinine 0.44 (L) 0.55 - 1.02 MG/DL    BUN/Creatinine ratio 16 12 - 20      GFR est AA >60 >60 ml/min/1.73m2    GFR est non-AA >60 >60 ml/min/1.73m2    Calcium 8.5 8.5 - 10.1 MG/DL         Assessment:     Hospital Problems  Date Reviewed: 2018          Codes Class Noted POA    Gastroesophageal reflux disease without esophagitis ICD-10-CM: K21.9  ICD-9-CM: 530.81  2018 Yes              Plan/Recommendations/Medical Decision Makin. Remove drain. 2. Discharge to home on bariatric liquids.     Signed By: Baron Willams MD     May 9, 2018

## 2018-05-11 ENCOUNTER — TELEPHONE (OUTPATIENT)
Dept: SURGERY | Age: 63
End: 2018-05-11

## 2018-05-11 NOTE — TELEPHONE ENCOUNTER
Bariatric Post-Operative Phone Calls: 48 hour phone call    Diet:Question of any nausea and/or vomiting. Protein intake (goal is 60 grams of protein daily)   Poor____Fair____Good____Great__x__     Comment:______________________________________________________________      ______________________________________________________________________    Hydration:Less than 32 ounces of water daily is fair to poor (Goal is 64 ounces per day)   Poor____ Fair____ Good_x___Great____    Comment:______________________________________________________________    ______________________________________________________________________      Ambulation:( walking at least 3 x week, for 15- 20 minutes)     Poor______ Fair______ Good___x___     Great______ Comment:__________________________________________________    ______________________________________________________________________      Urine Color: Question of any odor and color(should be jameson, pale, and clear) Dark______ Amber______ Pale______      Clear___x___ Comment:___________________________________________________                           ________________________________________________________________    Bowel movements: Question of any constipation- haven't had any bowel movements for more than 3 days. This could be related to protein intake and/or narcotic pain medication usage. Comment:                                                                            Has not had a bm as yet, instructed her to take a laxative today                                                  Pain: Left sided abdominal pain is normal (should be less than 3)  Question if pain medication is helpful.  10___ 9___ 8___ 7___ 6___ 5___ 4___ 3___     2__x_1___0___Comment:_________________________________________________    ______________________________________________________________________      Incision: (No redness, pain, swelling or fever) Healing Well___x___ Healed______Redness_________ Pain_________     Swelling_________ Fever__________(greater than 101 needs evaluation)    Comment:____________________________________________________________    ______________________________________________________________________  Use of incentive spirometer: Yes____       No    x       Next Appointment:_5/21/18_____________                 Support Group: Yes______No____x__    Additional Comments:__hospital did not give her a incentive spirometry__________________________________________________________    ____________________________________________________________________      If more than one parameter is not met or considered poor, nurse needs to discuss with provider recommend for patient to be seen in the office as soon as possible or refer to the provider for follow-up. Reinforce to patient to use bariatric educational booklet as guide. It is appropriate to refer patient to the nutritionist to discuss more in detail of diet and nutrition.

## 2018-05-11 NOTE — TELEPHONE ENCOUNTER
----- Message from Cameron Aguilar LPN sent at   3:36 PM EDT -----  Regardin hours post op call  [address] [facility]  ESOPHAGOGASTRODUODENOSCOPY (EGD) 18    Discharged 18

## 2018-05-21 ENCOUNTER — OFFICE VISIT (OUTPATIENT)
Dept: SURGERY | Age: 63
End: 2018-05-21

## 2018-05-21 VITALS
BODY MASS INDEX: 28.34 KG/M2 | TEMPERATURE: 98.5 F | OXYGEN SATURATION: 97 % | DIASTOLIC BLOOD PRESSURE: 78 MMHG | HEIGHT: 64 IN | HEART RATE: 68 BPM | WEIGHT: 166 LBS | SYSTOLIC BLOOD PRESSURE: 126 MMHG | RESPIRATION RATE: 20 BRPM

## 2018-05-21 DIAGNOSIS — Z09 FOLLOW-UP EXAMINATION FOLLOWING SURGERY: Primary | ICD-10-CM

## 2018-05-21 DIAGNOSIS — K91.2 POSTOPERATIVE INTESTINAL MALABSORPTION: ICD-10-CM

## 2018-05-21 DIAGNOSIS — K21.9 GASTROESOPHAGEAL REFLUX DISEASE, ESOPHAGITIS PRESENCE NOT SPECIFIED: ICD-10-CM

## 2018-05-21 RX ORDER — TIZANIDINE 2 MG/1
2 TABLET ORAL
Qty: 30 TAB | Refills: 0 | Status: SHIPPED | OUTPATIENT
Start: 2018-05-21 | End: 2018-05-28 | Stop reason: SDUPTHER

## 2018-05-21 NOTE — PROGRESS NOTES
1. Have you been to the ER, urgent care clinic since your last visit? Hospitalized since your last visit? 2 wks surgery [facility]    2. Have you seen or consulted any other health care providers outside of the [facility] since your last visit? Include any pap smears or colon screening.    no

## 2018-05-21 NOTE — MR AVS SNAPSHOT
[address] [phone] 
[phone] Patient: Rajiv Bran MRN: [id number] [facility]:0/40/6027 Visit Information Date & Time Provider Department Dept. Phone Encounter #  
 5/21/2018 10:40 AM Ching Mata NP [facility] 22 861 [phone] [id number] Your Appointments 6/4/2018  9:00 AM  
POST OP 10 MIN with Ching Mata NP  
[facility] [address] 6[address] ([address]) Appt Note: 2 po 4 week f/u  
 [address] [facility] [address]  
[address] [id number]  
  
    
 6/18/2018  9:00 AM  
POST OP 10 MIN with Ching Mata NP  
[facility] [address] 872 ([address]) Appt Note: 3 po 6 week f/u  
 [address]  
[phone]  
  
    
 8/10/2018  9:30 AM  
ROUTINE CARE with Katelin Luevano MD  
[facility] [address] [facility]) Appt Note: 6 month f/u  
 [address]  
[address] [address] [address] [id number] Upcoming Health Maintenance Date Due  
 PAP AKA CERVICAL CYTOLOGY 4/1/2018 Influenza Age 5 to Adult 8/1/2018 BREAST CANCER SCRN MAMMOGRAM 12/4/2019 COLONOSCOPY 3/20/2023 DTaP/Tdap/Td series (2 - Td) 4/7/2025 Allergies as of 5/21/2018  Review Complete On: 5/21/2018 By: Ching Mata NP Severity Noted Reaction Type Reactions Codeine  03/23/2011    Hives Tolerates Dilaudid Current Immunizations  Reviewed on 5/2/2016 Name Date Influenza Vaccine 10/1/2015 Influenza Vaccine Split 11/9/2012 10:52 AM  
 Tdap 4/7/2015 Zoster Vaccine, Live 4/23/2016 Not reviewed this visit Vitals BP Pulse Temp Resp Height(growth percentile) Weight(growth percentile) 126/78 68 98.5 °F (36.9 °C) 20 5' 4\" (1.626 m) 166 lb (75.3 kg) SpO2 BMI OB Status Smoking Status 97% 28.49 kg/m2 Hysterectomy Former Smoker Vitals History BMI and BSA Data Body Mass Index Body Surface Area  
 28.49 kg/m 2 1.84 m 2 Preferred Pharmacy Pharmacy Name Phone [facility] [address], [address] [facility] Thelma Dynmanuel AT [address] [phone] Your Updated Medication List  
  
   
This list is accurate as of 5/21/18 11:28 AM.  Always use your most recent med list.  
  
  
  
  
 calcium carbonate 600 mg calcium (1,500 mg) tablet Commonly known as:  Sherrin Neer Take 1 Tab by mouth daily. cholecalciferol 1,000 unit tablet Commonly known as:  VITAMIN D3 Take 1 Tab by mouth daily. esomeprazole 40 mg capsule Commonly known as:  Parul Cellar Take 40 mg by mouth as needed. MELATONIN PO Take 5 mg by mouth nightly as needed. psyllium 0.52 gram capsule Commonly known as:  METAMUCIL Take 1 Cap by mouth daily. STOOL SOFTENER PO Take 1 Tab by mouth daily. tiZANidine 2 mg tablet Commonly known as:  [place] Flight Take 1 Tab by mouth three (3) times daily as needed. Pain VITAMIN B-12 1,000 mcg tablet Generic drug:  cyanocobalamin Take 1,000 mcg by mouth daily. WOMEN'S MULTIVITAMIN 18 mg iron-400 mcg-500 mg Tab Generic drug:  mv-mn-iron-FA-Ca carb-vit K Take 1 Tab by mouth daily. Prescriptions Sent to Pharmacy Refills  
 tiZANidine (ZANAFLEX) 2 mg tablet 0 Sig: Take 1 Tab by mouth three (3) times daily as needed. Pain Class: Normal  
 Pharmacy: [facility] [address], [address] Ph #: [phone] Route: Oral  
  
To-Do List   
 07/03/2018 2:30 PM  
(Arrive by 2:15 PM) Appointment with Anton Clifford at [facility] ([phone]) Please, no calcium supplements or antacids that coat the stomach (ex: Tums, Mylanta) 24 hours prior to procedure. Maintain normal diet and medications. Dairy products are allowed. Wear an outfit with an elastic waistband (no zipper or metal snaps). Check in at registration 15min before your appointment time unless you were instructed to do otherwise. Please arrive 15 minutes prior to appointment to register. Patient Instructions Continue to use your protein shakes as primary source of nutrition and protein For pain ok to take Tylenol as directed and you can use the muscle relaxer as needed Heat is helpful and so is abdominal support (girdle / spanx) What you need to know: 1. Advance your diet to soft foods. Follow the handout that you were given today in the office. 2.  Take the recommended vitamins daily 3. No lifting greater than 20 lbs. 4.  You can do light jogging and walking. 5  Follow up in 2 weeks. 6.  You may go into a pool. 7.  If you are not able to tolerate liquids or soft foods. Please call our office. [phone] 
8. If you have vomiting and persistent epigastric pain or chest pain. You should call our office, the doctor on-call or go to the emergency room. What to do if you are constipated: You may  take Milk of Magnesia. Take 2 Tablespoons followed by 16 oz of water then 2 hours later take another 2 tablespoons. If  milk of magnesia does not work then take [place] or Miralax over the counter. Keep in mind that the Benefiber or Miralax may take a day or two to work. If all of the above do not work try a Fleets enema and follow the directions on the box. Soft and Mushy: Phase 1 Below is a list of basic items to purchase for the first phase of the  
soft mushy diet. Your surgeon or nurse practitioner will inform you when it is okay  
to advance to the next phase. Soft and Mushy Foods: Prepare food to the appropriate texture. ? Everything on clear and full liquid diet ? Applesauce (no sugar added) ? Hot & cold cereals (Cream of Wheat, Plain Cheerios®, Special K with protein®, plain oatmeal, grits) ? Frozen or canned vegetables (carrots, acorn squash, butternut squash, string beans, spinach, broccoli, cauliflower  florets only!) ? Canned fruit (in natural juice or with Splenda®) ? Fat-free, cholesterol-free egg substitute (P) ? Low-fat or fat-free cottage cheese (P) ? Low-fat or fat-free yogurt (P) ? Low-fat or fat-free [place] yogurt (P) ? Fat-free milk or 1% milk (P) ? Lactaid fat-free or 1% low fat milk (P) ? Low-fat well-cooked/soft beans (the consistency of refried beans) (P) ? No sugar added, low fat pudding (no pistachio or other flavor containing nuts) ? low-fat cream soups ? Low-fat chicken noodle or chicken rice soup (P) ? Sugar-free fudgesicles ? Sugar-free cocoa ? Fat free whipped or mashed potatoes ? Herbs and spices ? Lite butter, margarine, canola oil, olive oil, reduced-fat or fat-free mayonnaise, reduced-fat or fat-free salad dressing, reduced-fat or fat-free cream cheese, reduced-fat or fat-free sour cream.  
 
 
 P designates food sources of protein. Include a protein at each meal.  
 
If a food does not contain protein, you may want to consider adding protein powder to the food to give it extra protein. For example, mix protein powder in with the following: oatmeal, mashed potatoes, sugar-free pudding, sugar-free gelatin (see recipes in book), no-sugar-added applesauce. Soft Mushy Diet: Phase 1 Time Meal or Snack Soft/Mushy Food Amount (ounces) Protein 
(g) Supplement 6:30 am Sip on Fluids Sip on non-carbonated, calorie-free, no sugar added liquids. 8 oz 
 0 g Take Multivitamin containing 18 mg ferrous sulfate (iron) 7:00 am   Stop drinking fluids 30 minutes before breakfast  
7:30 am Breakfast ½ cup sugar-free oatmeal with 1 scoop of protein powder. Add cinnamon, nutmeg, artificial sweeteners as desired for flavor. 4 oz  
 20-25 g   
9:00 Snack (optional) High Protein Gelatin (see recipe in book) 4oz 10 g   
11:30 am Stop drinking liquids 30 minutes before lunch 12:00 pm Lunch Sip low-fat cream of potato soup or low-fat cream of chicken soup mixed with 1 scoop of protein powder 8 oz soup 25 g Take 400 mg calcium citrate 2:00 Snack (optional) ½ cup high protein pudding (see recipe in book) or ½ cup low-fat cottage cheese or yogurt. Can also add protein powder as needed. 4 oz 14 g 
 
or 
 
5 g   
 
3:00  5:30 pm  
Sip on Fluids Sip on non-carbonated, calorie-free, no sugar added liquids. 24 - 32 oz  
0 g Take 400 mg calcium citrate. 6:00 pm Dinner ¼ cup low-fat, well cooked beans (ex. black beans, low-fat refried beans) ¼ cup no-sugar-added applesauce 4 oz 3.5 g Take 400 mg of calcium citrate. 7:00 - 10:00 pm Sip on Fluids Sip on non-carbonated, no sugar added liquids as needed  16-24 oz 0 g Take Multivitamin with 18 mg ferrous sulfate Total:  80 oz clear fluids 63-77 
grams 2 Multivitamins with 18 mg ferrous sulfate, 3938-5628 mg calcium citrate Introducing [facility]! Dear Lavell Hernandez: Thank you for requesting a [employer] account. Our records indicate that you already have an active [employer] account. You can access your account anytime at [email or web address] Did you know that you can access your hospital and ER discharge instructions at any time in [employer]? You can also review all of your test results from your hospital stay or ER visit. Additional Information If you have questions, please visit the Frequently Asked Questions section of the [employer] website at [email or web address] Remember, [employer] is NOT to be used for urgent needs. For medical emergencies, dial 911. Now available from your iPhone and Android! Please provide this summary of care documentation to your next provider. Your primary care clinician is listed as MARTINEZ HANCOCK. If you have any questions after today's visit, please call [phone].

## 2018-05-21 NOTE — PATIENT INSTRUCTIONS
Continue to use your protein shakes as primary source of nutrition and protein     For pain ok to take Tylenol as directed and you can use the muscle relaxer as needed   Heat is helpful and so is abdominal support (girdle / spanx)    What you need to know:  1. Advance your diet to soft foods. Follow the handout that you were given today in the office. 2.  Take the recommended vitamins daily  3. No lifting greater than 20 lbs. 4.  You can do light jogging and walking. 5  Follow up in 2 weeks. 6.  You may go into a pool. 7.  If you are not able to tolerate liquids or soft foods. Please call our office. [phone]  8. If you have vomiting and persistent epigastric pain or chest pain. You should call our office, the doctor on-call or go to the emergency room. What to do if you are constipated: You may  take Milk of Magnesia. Take 2 Tablespoons followed by 16 oz of water then 2 hours later take another 2 tablespoons. If  milk of magnesia does not work then take Mu-ism-[place] or Miralax over the counter. Keep in mind that the Benefiber or Miralax may take a day or two to work. If all of the above do not work try a Fleets enema and follow the directions on the box. Soft and Mushy: Phase 1    Below is a list of basic items to purchase for the first phase of the   soft mushy diet. Your surgeon or nurse practitioner will inform you when it is okay   to advance to the next phase. Soft and Mushy Foods: Prepare food to the appropriate texture. ? Everything on clear and full liquid diet  ? Applesauce (no sugar added)  ? Hot & cold cereals (Cream of Wheat, Plain Cheerios®, Special K with protein®, plain oatmeal, grits)  ? Frozen or canned vegetables (carrots, acorn squash, butternut squash, string beans, spinach, broccoli, cauliflower - florets only!)   ? Canned fruit (in natural juice or with Splenda®)  ? Fat-free, cholesterol-free egg substitute (P)  ?  Low-fat or fat-free cottage cheese (P)  ? Low-fat or fat-free yogurt (P)  ? Low-fat or fat-free [place] yogurt (P)  ? Fat-free milk or 1% milk (P)  ? Lactaid fat-free or 1% low fat milk (P)  ? Low-fat well-cooked/soft beans (the consistency of refried beans) (P)  ? No sugar added, low fat pudding (no pistachio or other flavor containing nuts)  ? low-fat cream soups  ? Low-fat chicken noodle or chicken rice soup (P)  ? Sugar-free fudgesicles  ? Sugar-free cocoa  ? Fat free whipped or mashed potatoes   ? Herbs and spices  ? Lite butter, margarine, canola oil, olive oil, reduced-fat or fat-free mayonnaise, reduced-fat or fat-free salad dressing, reduced-fat or fat-free cream cheese, reduced-fat or fat-free sour cream.        P designates food sources of protein. Include a protein at each meal.     If a food does not contain protein, you may want to consider adding protein powder to the food to give it extra protein. For example, mix protein powder in with the following: oatmeal, mashed potatoes, sugar-free pudding, sugar-free gelatin (see recipes in book), no-sugar-added applesauce. Soft Mushy Diet: Phase 1  Time Meal or Snack Soft/Mushy Food Amount (ounces) Protein  (g) Supplement   6:30 am Sip on Fluids Sip on non-carbonated, calorie-free, no sugar added liquids. 8 oz   0 g Take Multivitamin containing 18 mg ferrous sulfate (iron)    7:00 am   Stop drinking fluids 30 minutes before breakfast   7:30 am Breakfast ½ cup sugar-free oatmeal with 1 scoop of protein powder. Add cinnamon, nutmeg, artificial sweeteners as desired for flavor.   4 oz    20-25 g    9:00 Snack  (optional) High Protein Gelatin (see recipe in book) 4oz 10 g    11:30 am Stop drinking liquids 30 minutes before lunch   12:00 pm Lunch Sip low-fat cream of potato soup or low-fat cream of chicken soup mixed with 1 scoop of protein powder 8 oz soup 25 g Take 400 mg calcium citrate   2:00 Snack  (optional) ½ cup high protein pudding (see recipe in book)   or   ½ cup low-fat cottage cheese or yogurt. Can also add protein powder as needed. 4 oz 14 g    or    5 g      3:00 - 5:30 pm   Sip on Fluids   Sip on non-carbonated, calorie-free, no sugar added liquids. 24 - 32 oz   0 g   Take 400 mg calcium citrate. 6:00 pm Dinner ¼ cup low-fat, well cooked beans (ex. black beans, low-fat refried beans)  ¼ cup no-sugar-added applesauce 4 oz 3.5 g Take 400 mg of calcium citrate.    7:00 - 10:00 pm Sip on Fluids Sip on non-carbonated, no sugar added liquids as needed  16-24 oz 0 g Take Multivitamin with 18 mg ferrous sulfate   Total:  80 oz clear fluids 63-77  grams 2 Multivitamins with 18 mg ferrous sulfate, 5957-1516 mg calcium citrate

## 2018-05-22 ENCOUNTER — TELEPHONE (OUTPATIENT)
Dept: SURGERY | Age: 63
End: 2018-05-22

## 2018-05-22 NOTE — TELEPHONE ENCOUNTER
I called and spoke to [facility] and she said Dr Pita Roach needs to do a Peer to Peer with Deshawn's' contact number is [phone], then select health care provider, then medical management, then Peer to Peer. Coleman auth # is [id number]. She said it needs to be done with in the next week. I told her I will forward to Dr Pita Roach.

## 2018-05-24 NOTE — PROGRESS NOTES
Chief Complaint   Patient presents with    Surgical Follow-up     2 weeks s/p lap resection of gastric pouch, lap Nissen, haital hernia repair by Dr Roxann Anthony. down   pounds. Doc Hansen is 2 weeks status post resection of gastric pouch, Nissen and hiatal hernia repair. Presents today for surgical follow up / GERD. She is feeling much better and the reflux she had prior to surgery has resolved. Patient is satisfied with progress. Patient is consuming 55 grams of protein daily. Patient is drinking 32-40 oz of fluids per day. Bowels moving most days. No fever or chills, chest pain or shortness of breath. No reflux, night-time cough, heartburn or regurgitation. Vitamin compliance yes  Activity  Walking some but fatigued     Physical Exam  Visit Vitals    /78    Pulse 68    Temp 98.5 °F (36.9 °C)    Resp 20    Ht 5' 4\" (1.626 m)    Wt 166 lb (75.3 kg)    SpO2 97%    BMI 28.49 kg/m2     A + O x 3  Chest  CTA, unlabored   COR  RRR  ABD Soft, lap sites are C./D/I; minimal tenderness; ND, no masses or hernias   EXT No edema; ambulating independently       ICD-10-CM ICD-9-CM    1. Follow-up examination following surgery Z09 V67.00    2. BMI 28.0-28.9,adult Z68.28 V85.24    3. Gastroesophageal reflux disease, esophagitis presence not specified K21.9 530.81    4. Postoperative intestinal malabsorption K91.2 579.3        Doc Hansen is 2 weeks s/p revision for GERD  doing well   Diet soft stage I   Activity walking and may swim   Continue vitamins and protein   Follow-up in 2 weeks  Support group  Doc Hansen verbalized understanding and questions were answered to the best of my knowledge and ability. Diet  educational materials were provided. 16 minutes spent in face to face with Doc Hansen > 50% counseling.

## 2018-05-29 ENCOUNTER — TELEPHONE (OUTPATIENT)
Dept: SURGERY | Age: 63
End: 2018-05-29

## 2018-05-29 NOTE — TELEPHONE ENCOUNTER
----- Message from Octavia Opitz, LPN sent at 1/3/0263  3:39 PM EDT -----  Regarding: 3 week post op call  [facility]  ESOPHAGOGASTRODUODENOSCOPY (EGD) 5/7/18    Discharged 5/9/18

## 2018-05-29 NOTE — TELEPHONE ENCOUNTER
Bariatric Post-Operative Phone Calls: Week 3    Diet:Question of any nausea and/or vomiting. Question of tolerance to diet advancement from liquids to solids. Protein intake (goal is 60 grams of protein daily)   Poor____Fair____Good____Great____     Comment:____40 grams__________________________________________________________      ______________________________________________________________________    Hydration:Less than 32 ounces of water daily is fair to poor (Goal is 64 ounces per day)   Poor____ Fair____ Good____Great__x__    Comment:______________________________________________________________    ______________________________________________________________________      Ambulation:( walking at least 3 x week, for at least 30 minutes)   Poor______ Fair______ Good______     Great___x___ Comment:__________________________________________________    ______________________________________________________________________      Urine Color: Question of any odor and color(should be jameson, pale, and clear) Dark______ Amber______ Pale______      Clear___x___ Comment:___________________________________________________                           ________________________________________________________________    Bowel movements: Question of any constipation- haven't had any bowel movements for more than 3 days. This could be related to protein intake and/or narcotic pain medication usage. Comment:                                                                                     okay                                         Pain: Left sided abdominal pain is normal (should be less than 3)         Question if pain medication is helpful.  10___ 9___ 8___ 7___ 6___ 5_x__ 4___ 3___     2___1___0___Comment:_left side abdominal pain________________________________________________    ______________________________________________________________________      Incision: (No redness, pain, swelling or fever) Healing Well__x____     Healed______Redness_________ Pain_________     Swelling_________ Fever__________(greater than 101 needs evaluation)    Comment:____________________________________________________________    ______________________________________________________________________  Use of incentive spirometer: Yes____       No   x        Next Appointment:__6/4/18____________                 Support Group: Yes______No____x__    Additional Comments:__she said she is sleeping in a recliner and she said is having left side abdominal pain, she said she was given a muscle relaxer and she said they are helping. I told her she could also try a heating pad to her left side, she said she is using Tylenol as well.__________________________________________________________    ____________________________________________________________________      If more than one parameter is not met or considered poor, nurse needs to discuss with provider recommend for patient to be seen in the office as soon as possible or refer to the provider for follow-up. Reinforce to patient to use bariatric educational booklet as guide. It is appropriate to refer patient to the nutritionist to discuss more in detail of diet and nutrition.

## 2018-05-31 RX ORDER — TIZANIDINE 2 MG/1
TABLET ORAL
Qty: 30 TAB | Refills: 0 | Status: SHIPPED | OUTPATIENT
Start: 2018-05-31 | End: 2018-06-08 | Stop reason: SDUPTHER

## 2018-06-04 ENCOUNTER — OFFICE VISIT (OUTPATIENT)
Dept: SURGERY | Age: 63
End: 2018-06-04

## 2018-06-04 VITALS
WEIGHT: 166 LBS | SYSTOLIC BLOOD PRESSURE: 130 MMHG | HEIGHT: 64 IN | DIASTOLIC BLOOD PRESSURE: 80 MMHG | BODY MASS INDEX: 28.34 KG/M2 | HEART RATE: 76 BPM | TEMPERATURE: 97.8 F | OXYGEN SATURATION: 94 % | RESPIRATION RATE: 16 BRPM

## 2018-06-04 DIAGNOSIS — Z09 FOLLOW-UP EXAMINATION FOLLOWING SURGERY: Primary | ICD-10-CM

## 2018-06-04 DIAGNOSIS — G89.18 POST-OP PAIN: ICD-10-CM

## 2018-06-04 DIAGNOSIS — K91.2 POSTOPERATIVE INTESTINAL MALABSORPTION: ICD-10-CM

## 2018-06-04 RX ORDER — SUCRALFATE 1 G/1
1 TABLET ORAL 4 TIMES DAILY
COMMUNITY
End: 2018-06-04 | Stop reason: ALTCHOICE

## 2018-06-04 NOTE — PATIENT INSTRUCTIONS
Take the [address] for now       What you need to know:  1 . Advance your diet to moist meats. Follow the handout that you were given today in the office. 2. Take the recommended vitamins daily  3 No lifting greater than 40 lbs. 4. You can do light jogging, moderate walking and a recumbent bike. 5 Follow up in 2 weeks. 6. You may go into a pool. 7. If you are not able to tolerate liquids, soft foods or moist meats. Please call our office. [phone]  8. If you have vomiting and persistent epigastric pain or chest pain. You should call our office, the doctor on-call or go to the emergency room. What to do if you are constipated: You may  take Milk of Magnesia. Take 2 Tablespoons followed by 16 oz of water then 2 hours later take another 2 tablespoons. If  milk of magnesia does not work then take [place] or Miralax over the counter. Keep in mind that the Benefiber or Miralax may take a day or two to work. If all of the above do not work try a Fleets enema and follow the directions on the box. Shopping List [employer]     Soft Mushy Diet: Phase 2 - Moist Meats     Below is a list of moist meats that you can now introduce into your diet. Moist Meats: Prepare food to the appropriate texture using low-fat cooking   methods       ? Tuna packed in water (strain before eating)  ? White flaky fish (corwin, cod, flounder, tilapia, salmon)   ? White chicken breast packed in water (strain before eating)  ? 96-99% fat free thinly sliced deli meat (ham, turkey, roast beef)  ? Fat free non-stick spray  ? Silken Tofu  ? Low-fat or vegetarian refried beans  ? Well-cooked beans and lentils  ? Skinless turkey or chicken (prepare to a soft texture)  ? 93% lean pureed beef (round or sirloin only)  ? Lean pork (cooked until very tender, cut into small pieces)  ? Eggs (preferable egg whites)  ? Egg substitutes  ? Herbs and spices  ?  Lite butter, margarine, canola oil, olive oil, reduced-fat or fat-free piña, reduced-fat or fat-free salad dressing, reduced-fat or fat-free cream cheese, reduced-fat or fat-free sour cream, lemon juice, salt, pepper, mustard, ketchup, salsa. See patient handbook for more low-fat cooking ideas. ? Be sure to use moist methods of cooking. Avoid microwaving meats because it can dry out the food making it harder to tolerate. Soft Mushy Diet: Phase 2  Time Meal or Snack Soft/Mushy Food Amount (ounces) Protein  (g) Supplement   6:30 am Sip on Fluids Sip on non-carbonated, calorie-free, no sugar added liquids. 8 oz   0 g Take Multivitamin containing 18 mg ferrous sulfate (iron)    7:00 am   Stop drinking fluids 30 minutes before breakfast   7:30 am Breakfast ¼ cup soft scrambled egg or egg substitute   ¼ cup canned fruit (packed in natural juice, strained)  4 oz    7 g    9:00 Snack  (optional) ½ cup low-fat or fat-free yogurt   or   ½ cup cottage cheese  4oz 4g  or  14 g    11:30 am Stop drinking liquids 30 minutes before lunch   12:00 pm Lunch ¼ cup low-fat or lean deli meat  with  ¼ well cooked green beans 4 oz  14 g Take 400 mg calcium citrate   2:00 Snack  (optional) ½ cup high protein, sugar-free pudding with 1 scoop added protein powder (see recipe in book). 4 oz 14 g      3:00 - 5:30 pm   Sip on Fluids   Sip on non-carbonated, calorie-free, no sugar added liquids. 24 - 32 oz   0 g   Take 400 mg calcium citrate. 6:00 pm Dinner ¼ cup soft/flaky fish (tilapia, flounder, tuna)  ¼ cup mashed potatoes or pureed cauliflower mashed potatoes (consider adding protein powder)  4 oz 14 g Take 400 mg of calcium citrate.    7:00 - 10:00 pm Sip on Fluids Sip on non-carbonated, no sugar added liquids as needed  16-24 oz 0 g Take Multivitamin with 18 mg ferrous sulfate   Total:  64 oz fluids 63  grams 2 Multivitamins with 18 mg ferrous sulfate, 7207-3860 mg calcium citrate

## 2018-06-04 NOTE — MR AVS SNAPSHOT
[address] [facility] [id number] 
[phone] Patient: William Orr MRN: [id number] Visit Information Date & Time Provider Department Dept. Phone Encounter #  
 2018  9:00 AM Sheryl Youssef NP [facility] [address] 544 [phone] [id number] Your Appointments 2018  9:00 AM  
POST OP 10 MIN with Sheryl Youssef NP  
[facility] [address] ([address]) Appt Note: 3 po 6 week f/u  
 [address] [facility] [address]  
[address] [id number]  
  
    
 8/10/2018  9:30 AM  
ROUTINE CARE with Carlie Vitale MD  
[facility] [address] [facility]) Appt Note: 6 month f/u  
 [address] [place] [address]  
[address] [id number] [address] [address] Upcoming Health Maintenance Date Due  
 PAP AKA CERVICAL CYTOLOGY 2018 Influenza Age 5 to Adult 2018 BREAST CANCER SCRN MAMMOGRAM 2019 COLONOSCOPY 3/20/2023 DTaP/Tdap/Td series (2 - Td) 2025 Allergies as of 2018  Review Complete On: 2018 By: Justyna Griffiths LPN Severity Noted Reaction Type Reactions Codeine  2011    Hives Tolerates Dilaudid Current Immunizations  Reviewed on 2016 Name Date Influenza Vaccine 10/1/2015 Influenza Vaccine Split 2012 10:52 AM  
 Tdap 2015 Zoster Vaccine, Live 2016 Not reviewed this visit Vitals BP Pulse Temp Resp Height(growth percentile) Weight(growth percentile) 130/80 76 97.8 °F (36.6 °C) 16 5' 4\" (1.626 m) 166 lb (75.3 kg) SpO2 BMI OB Status Smoking Status 94% 28.49 kg/m2 Hysterectomy Former Smoker Vitals History BMI and BSA Data  Body Mass Index Body Surface Area  
 28.49 kg/m 2 1.84 m 2  
  
  
 Preferred Pharmacy Pharmacy Name Phone [facility] [address], [address] [facility] John [address]segu[facility] AT [address] [phone] Your Updated Medication List  
  
   
This list is accurate as of 6/4/18  9:39 AM.  Always use your most recent med list.  
  
  
  
  
 calcium carbonate 600 mg calcium (1,500 mg) tablet Commonly known as:  Lendel Migel Take 1 Tab by mouth daily. cholecalciferol 1,000 unit tablet Commonly known as:  VITAMIN D3 Take 1 Tab by mouth daily. esomeprazole 40 mg capsule Commonly known as:  Lizzy Dub Take 40 mg by mouth as needed. MELATONIN PO Take 5 mg by mouth nightly as needed. psyllium 0.52 gram capsule Commonly known as:  METAMUCIL Take 1 Cap by mouth daily. STOOL SOFTENER PO Take 1 Tab by mouth daily. tiZANidine 2 mg tablet Commonly known as:  Kelsey Santana TAKE 1 TABLET BY MOUTH THREE TIMES DAILY AS NEEDED FOR PAIN  
  
 VITAMIN B-12 1,000 mcg tablet Generic drug:  cyanocobalamin Take 1,000 mcg by mouth daily. WOMEN'S MULTIVITAMIN 18 mg iron-400 mcg-500 mg Tab Generic drug:  mv-mn-iron-FA-Ca carb-vit K Take 1 Tab by mouth daily. To-Do List   
 07/03/2018 2:30 PM  
(Arrive by 2:15 PM) Appointment with Zaira Clifford at [facility] ([phone]) Please, no calcium supplements or antacids that coat the stomach (ex: Tums, Mylanta) 24 hours prior to procedure. Maintain normal diet and medications. Dairy products are allowed. Wear an outfit with an elastic waistband (no zipper or metal snaps). Check in at registration 15min before your appointment time unless you were instructed to do otherwise. Please arrive 15 minutes prior to appointment to register. Patient Instructions Take the [address] for now What you need to know: 
1 . Advance your diet to moist meats. Follow the handout that you were given today in the office. 2. Take the recommended vitamins daily 3 No lifting greater than 40 lbs. 4. You can do light jogging, moderate walking and a recumbent bike. 5 Follow up in 2 weeks. 6. You may go into a pool. 7. If you are not able to tolerate liquids, soft foods or moist meats. Please call our office. [phone] 
8. If you have vomiting and persistent epigastric pain or chest pain. You should call our office, the doctor on-call or go to the emergency room. What to do if you are constipated: You may  take Milk of Magnesia. Take 2 Tablespoons followed by 16 oz of water then 2 hours later take another 2 tablespoons. If  milk of magnesia does not work then take [place] or Miralax over the counter. Keep in mind that the Benefiber or Miralax may take a day or two to work. If all of the above do not work try a Fleets enema and follow the directions on the box. Shopping List Kika Innocent Soft Mushy Diet: Phase 2 - Moist Meats Below is a list of moist meats that you can now introduce into your diet. Moist Meats: Prepare food to the appropriate texture using low-fat cooking  
methods ? Tuna packed in water (strain before eating) ? White flaky fish (corwin, cod, flounder, tilapia, salmon) ? White chicken breast packed in water (strain before eating) ? 96-99% fat free thinly sliced deli meat (ham, turkey, roast beef) ? Fat free non-stick spray ? Silken Tofu ? Low-fat or vegetarian refried beans ? Well-cooked beans and lentils ? Skinless turkey or chicken (prepare to a soft texture) ? 93% lean pureed beef (round or sirloin only) ? Lean pork (cooked until very tender, cut into small pieces) ? Eggs (preferable egg whites) ? Egg substitutes ? Herbs and spices ?  Lite butter, margarine, canola oil, olive oil, reduced-fat or fat-free piña, reduced-fat or fat-free salad dressing, reduced-fat or fat-free cream cheese, reduced-fat or fat-free sour cream, lemon juice, salt, pepper, mustard, ketchup, salsa. See patient handbook for more low-fat cooking ideas. ? Be sure to use moist methods of cooking. Avoid microwaving meats because it can dry out the food making it harder to tolerate. Soft Mushy Diet: Phase 2 Time Meal or Snack Soft/Mushy Food Amount (ounces) Protein 
(g) Supplement 6:30 am Sip on Fluids Sip on non-carbonated, calorie-free, no sugar added liquids. 8 oz 
 0 g Take Multivitamin containing 18 mg ferrous sulfate (iron) 7:00 am   Stop drinking fluids 30 minutes before breakfast  
7:30 am Breakfast ¼ cup soft scrambled egg or egg substitute ¼ cup canned fruit (packed in natural juice, strained)  4 oz  
 7 g   
9:00 Snack (optional) ½ cup low-fat or fat-free yogurt  
or ½ cup cottage cheese  4oz 4g 
or 14 g   
11:30 am Stop drinking liquids 30 minutes before lunch 12:00 pm Lunch ¼ cup low-fat or lean deli meat 
with ¼ well cooked green beans 4 oz  14 g Take 400 mg calcium citrate 2:00 Snack (optional) ½ cup high protein, sugar-free pudding with 1 scoop added protein powder (see recipe in book). 4 oz 14 g   
 
3:00  5:30 pm  
Sip on Fluids Sip on non-carbonated, calorie-free, no sugar added liquids. 24 - 32 oz  
0 g Take 400 mg calcium citrate. 6:00 pm Dinner ¼ cup soft/flaky fish (tilapia, flounder, tuna) ¼ cup mashed potatoes or pureed cauliflower mashed potatoes (consider adding protein powder)  4 oz 14 g Take 400 mg of calcium citrate. 7:00 - 10:00 pm Sip on Fluids Sip on non-carbonated, no sugar added liquids as needed  16-24 oz 0 g Take Multivitamin with 18 mg ferrous sulfate Total:  64 oz fluids 63 
grams 2 Multivitamins with 18 mg ferrous sulfate, 1747-1794 mg calcium citrate Introducing [facility]! Dear Staci Bearden: Thank you for requesting a [employer] account. Our records indicate that you already have an active [employer] account.   You can access your account anytime at [email or web address] Did you know that you can access your hospital and ER discharge instructions at any time in [employer]? You can also review all of your test results from your hospital stay or ER visit. Additional Information If you have questions, please visit the Frequently Asked Questions section of the [employer] website at [email or web address] Remember, [employer] is NOT to be used for urgent needs. For medical emergencies, dial 911. Now available from your iPhone and Android! Please provide this summary of care documentation to your next provider. Your primary care clinician is listed as MARTINEZ HACNOCK. If you have any questions after today's visit, please call [phone].

## 2018-06-04 NOTE — PROGRESS NOTES
1. Have you been to the ER, urgent care clinic since your last visit? Hospitalized since your last visit?  no    2. Have you seen or consulted any other health care providers outside of the [address] [place] [address] since your last visit? Include any pap smears or colon screening.    no

## 2018-06-08 RX ORDER — TIZANIDINE 2 MG/1
TABLET ORAL
Qty: 30 TAB | Refills: 0 | Status: SHIPPED | OUTPATIENT
Start: 2018-06-08 | End: 2018-06-18 | Stop reason: ALTCHOICE

## 2018-06-09 NOTE — PROGRESS NOTES
Chief Complaint   Patient presents with    Surgical Follow-up     4 weeks s/p resection of gastric pouch and GJ by Dr Carlita Riley. down  15 pounds. weight same as last visit. Kenzie Cea is 4 weeks status post resection gastric pouch and revision G-J. Presents today for surgical follow up and post op pain. Patient has lost weight is stable the past 2 weeks. She continues to c/o some left sided abdominal pain with activity and low energy. She is \"better, just not like I want\". Mild intermittent nausea and no desire to eat. Pain is with activity and at rest she \"feels ok\". Poor stamina and trying to make herself do at least 1 shake per day + foods. No vomiting. Patient is drinking about 38-40 oz of fluids per day. Bowels moving most days with some constipation. No fever or chills, chest pain or shortness of breath. No reflux, night-time cough, heartburn or regurgitation. Vitamin compliance yes  Activity  Walking     Physical Exam  Visit Vitals    /80    Pulse 76    Temp 97.8 °F (36.6 °C)    Resp 16    Ht 5' 4\" (1.626 m)    Wt 166 lb (75.3 kg)    SpO2 94%    BMI 28.49 kg/m2     A + O x 3  Chest  CTA, unlabored   COR  RRR  ABD Soft, obese, NT/ND, no masses or hernias   EXT No edema; ambulating independently       ICD-10-CM ICD-9-CM    1. Follow-up examination following surgery Z09 V67.00    2. Postoperative intestinal malabsorption K91.2 579.3    3. BMI 28.0-28.9,adult Z68.28 V85.24    4.  Post-op pain G89.18 338.18        Kenzie Cea is 4 weeks  s/p revision G-J improving slowly   resume Nexium every day for now and may help ease nausea and \"unsettled\" feeling     Diet soft stage II  Continue vitamins and protein supplements   For pain may use Tylenol as directed, Zanaflex as ordered and heating pad   Continue to avoid lifting, pushing, pulling or straining > 10 lbs   Activity walk or swim   Follow-up in 2 weeks  Will reassess for RTW at that time   Support group  Janet Fonseca VAZQUEZ Galvez verbalized understanding and questions were answered to the best of my knowledge and ability. Diet and activity  educational materials were provided. 17 minutes spent in face to face with Tiara Jennings > 50% counseling.

## 2018-06-11 ENCOUNTER — DOCUMENTATION ONLY (OUTPATIENT)
Dept: SURGERY | Age: 63
End: 2018-06-11

## 2018-06-18 ENCOUNTER — OFFICE VISIT (OUTPATIENT)
Dept: SURGERY | Age: 63
End: 2018-06-18

## 2018-06-18 VITALS
BODY MASS INDEX: 27.31 KG/M2 | HEIGHT: 64 IN | OXYGEN SATURATION: 98 % | DIASTOLIC BLOOD PRESSURE: 74 MMHG | RESPIRATION RATE: 18 BRPM | WEIGHT: 160 LBS | HEART RATE: 62 BPM | SYSTOLIC BLOOD PRESSURE: 124 MMHG | TEMPERATURE: 98 F

## 2018-06-18 DIAGNOSIS — Z09 FOLLOW-UP EXAMINATION FOLLOWING SURGERY: Primary | ICD-10-CM

## 2018-06-18 DIAGNOSIS — K91.2 POSTOPERATIVE INTESTINAL MALABSORPTION: ICD-10-CM

## 2018-06-18 NOTE — PROGRESS NOTES
1. Have you been to the ER, urgent care clinic since your last visit? Hospitalized since your last visit  no    2. Have you seen or consulted any other health care providers outside of the [facility] since your last visit? Include any pap smears or colon screening.    no

## 2018-06-18 NOTE — LETTER
NOTIFICATION OF RETURN TO WORK  
 
6/18/2018 9:22 AM 
 
Ms. Joycelyn Johnson 8 formerly [facility] [address] [facility] To Whom It May Concern: 
 
Joycelyn Johnson was under the care of [address] [place] [address] from 5/7/18 to present. She will be able to return to work on 6/19/18 with regular duties and/or activities . If there are questions or concerns please have the patient contact our office.  
 
Sincerely, 
 
 
Leeta Severe, NP

## 2018-06-18 NOTE — PATIENT INSTRUCTIONS
Continue to use your protein shake daily as needed to supplement    Stay on your vitamins daily     You may now advance to Regular Stage of the Gastric Bypass diet, adding fresh produce. This is outlined in detail in your education book. Choose foods wisely. Think about the nutritional benefit of the food. Protein first and at each meal.  Include produce (vegetables and/or fruits) at each meal.    Limit or eliminate \"filler\" foods such as breads, pasta, potatoes, rice, crackers, pretzels, and the like. Avoid eating and drinking together, there just isn't enough room! You may continue protein supplementation if needed to meet your daily protein goals. Many patients use a protein shake or bar to replace a meal.  There are a variety of protein supplements listed in your education book. Take your vitamins every day, attend support group and keep your regular follow-up appointments. Ultimately, success depends on you. Choose to use your tool and we will guide you along the way!

## 2018-06-18 NOTE — MR AVS SNAPSHOT
[address] [place] [address] [facility] [address] [place] [id number] [phone] Patient: Doc Hansen MRN: [id number] HSX:5/64/6308 Visit Information Date & Time Provider Department Dept. Phone Encounter #  
 6/18/2018  9:00 AM Brook Escobar NP [facility] 22 [phone] [id number] Your Appointments 8/10/2018  9:30 AM  
ROUTINE CARE with MD Willis Han [facility] End[facility]in[facility] Appt Note: 6 month f/u  
 [address] [place] [address]  
[address] [address] [address] [place] [id number] Upcoming Health Maintenance Date Due  
 PAP AKA CERVICAL CYTOLOGY 4/1/2018 Influenza Age 5 to Adult 8/1/2018 BREAST CANCER SCRN MAMMOGRAM 12/4/2019 COLONOSCOPY 3/20/2023 DTaP/Tdap/Td series (2 - Td) 4/7/2025 Allergies as of 6/18/2018  Review Complete On: 6/18/2018 By: Brandi Larios LPN Severity Noted Reaction Type Reactions Codeine  03/23/2011    Hives Tolerates Dilaudid Current Immunizations  Reviewed on 5/2/2016 Name Date Influenza Vaccine 10/1/2015 Influenza Vaccine Split 11/9/2012 10:52 AM  
 Tdap 4/7/2015 Zoster Vaccine, Live 4/23/2016 Not reviewed this visit Vitals BP Pulse Temp Resp Height(growth percentile) Weight(growth percentile) 124/74 62 98 °F (36.7 °C) 18 5' 4\" (1.626 m) 160 lb (72.6 kg) SpO2 BMI OB Status Smoking Status 98% 27.46 kg/m2 Hysterectomy Former Smoker Vitals History BMI and BSA Data Body Mass Index Body Surface Area  
 27.46 kg/m 2 1.81 m 2 Preferred Pharmacy Pharmacy Name Phone [facility] [address], [address] [facility] AT [address] [phone] Your Updated Medication List  
  
   
This list is accurate as of 6/18/18  9:25 AM.  Always use your most recent med list.  
  
  
 calcium carbonate 600 mg calcium (1,500 mg) tablet Commonly known as:  Sherrin Neer Take 1 Tab by mouth daily. cholecalciferol 1,000 unit tablet Commonly known as:  VITAMIN D3 Take 1 Tab by mouth daily. esomeprazole 40 mg capsule Commonly known as:  Parul Cellar Take 40 mg by mouth as needed. MELATONIN PO Take 5 mg by mouth nightly as needed. psyllium 0.52 gram capsule Commonly known as:  METAMUCIL Take 1 Cap by mouth daily. STOOL SOFTENER PO Take 1 Tab by mouth daily. tiZANidine 2 mg tablet Commonly known as:  Brijesh Flight TAKE 1 TABLET BY MOUTH THREE TIMES DAILY AS NEEDED FOR PAIN  
  
 VITAMIN B-12 1,000 mcg tablet Generic drug:  cyanocobalamin Take 1,000 mcg by mouth daily. WOMEN'S MULTIVITAMIN 18 mg iron-400 mcg-500 mg Tab Generic drug:  mv-mn-iron-FA-Ca carb-vit K Take 1 Tab by mouth daily. To-Do List   
 07/03/2018 2:30 PM  
(Arrive by 2:15 PM) Appointment with Anton Clifford at [facility] ([phone]) Please, no calcium supplements or antacids that coat the stomach (ex: Tums, Mylanta) 24 hours prior to procedure. Maintain normal diet and medications. Dairy products are allowed. Wear an outfit with an elastic waistband (no zipper or metal snaps). Check in at registration 15min before your appointment time unless you were instructed to do otherwise. Please arrive 15 minutes prior to appointment to register. Patient Instructions Continue to use your protein shake daily as needed to supplement Stay on your vitamins daily You may now advance to Regular Stage of the Gastric Bypass diet, adding fresh produce. This is outlined in detail in your education book. Choose foods wisely. Think about the nutritional benefit of the food.    
Protein first and at each meal.  Include produce (vegetables and/or fruits) at each meal.   
 Limit or eliminate \"filler\" foods such as breads, pasta, potatoes, rice, crackers, pretzels, and the like. Avoid eating and drinking together, there just isn't enough room! You may continue protein supplementation if needed to meet your daily protein goals. Many patients use a protein shake or bar to replace a meal.  There are a variety of protein supplements listed in your education book. Take your vitamins every day, attend support group and keep your regular follow-up appointments. Ultimately, success depends on you. Choose to use your tool and we will guide you along the way! Introducing [facility] & HEALTH SERVICES! Dear Jackie Donnelly: Thank you for requesting a Leaf account. Our records indicate that you already have an active Leaf account. You can access your account anytime at [email or web address] Did you know that you can access your hospital and ER discharge instructions at any time in Leaf? You can also review all of your test results from your hospital stay or ER visit. Additional Information If you have questions, please visit the Frequently Asked Questions section of the Leaf website at [email or web address] Remember, Leaf is NOT to be used for urgent needs. For medical emergencies, dial 911. Now available from your iPhone and Android! Please provide this summary of care documentation to your next provider. Your primary care clinician is listed as MARTINEZ HANCOCK. If you have any questions after today's visit, please call [phone].

## 2018-06-18 NOTE — PROGRESS NOTES
Chief Complaint   Patient presents with    Surgical Follow-up     6 weeks s/p resection of gastric pouch and GJ by Dr Hussain Vanegas. down 21 pounds,  lost 6  pounds. Joycelyn Johnson is 6 weeks status post revision of gastric pouch and GJ. Presents today for post surgical evaluation. Patient has lost 21 lbs. Since surgery. Patient is satisfied with progress. She is finally feeling \"so much better\" and is ready to return to work. Patient is consuming about 45 grams of protein daily. Patient is drinking 48 oz of fluids per day. Bowels moving most days. No fever or chills, chest pain or shortness of breath. Vitamin compliance yes  Activity  Walking some     Physical Exam  Visit Vitals    /74    Pulse 62    Temp 98 °F (36.7 °C)    Resp 18    Ht 5' 4\" (1.626 m)    Wt 160 lb (72.6 kg)    SpO2 98%    BMI 27.46 kg/m2     A + O x 3  Chest  CTA, unlabored   COR  RRR  ABD Soft, obese, well healed lap sites,  NT/ND, no masses or hernias   EXT No edema; ambulating independently       ICD-10-CM ICD-9-CM    1. Follow-up examination following surgery Z09 V67.00    2. BMI 27.0-27.9,adult Z68.27 V85.23    3. 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 The patient is asked to make an attempt to improve diet and exercise patterns to aid in medical management of this problem. educational materials were provided. 15 minutes spent in face to face with Joycelyn Johnson > 50% counseling.

## 2018-06-27 ENCOUNTER — HOSPITAL ENCOUNTER (OUTPATIENT)
Dept: BONE DENSITY | Age: 63
Discharge: HOME | End: 2018-06-27
Attending: INTERNAL MEDICINE
Payer: COMMERCIAL

## 2018-06-27 DIAGNOSIS — Z86.39 H/O HYPERPARATHYROIDISM: ICD-10-CM

## 2018-06-27 DIAGNOSIS — M80.00XS OSTEOPOROSIS WITH CURRENT PATHOLOGICAL FRACTURE, UNSPECIFIED OSTEOPOROSIS TYPE, SEQUELA: ICD-10-CM

## 2018-06-27 DIAGNOSIS — E55.9 VITAMIN D DEFICIENCY: ICD-10-CM

## 2018-06-27 PROCEDURE — 77080 DXA BONE DENSITY AXIAL: CPT

## 2018-07-15 DIAGNOSIS — M80.00XS OSTEOPOROSIS WITH CURRENT PATHOLOGICAL FRACTURE, UNSPECIFIED OSTEOPOROSIS TYPE, SEQUELA: ICD-10-CM

## 2018-07-15 DIAGNOSIS — E55.9 VITAMIN D DEFICIENCY: ICD-10-CM

## 2018-07-15 DIAGNOSIS — Z86.39 H/O HYPERPARATHYROIDISM: ICD-10-CM

## 2018-07-30 ENCOUNTER — OFFICE VISIT (OUTPATIENT)
Dept: SURGERY | Age: 63
End: 2018-07-30

## 2018-07-30 VITALS
WEIGHT: 153.6 LBS | SYSTOLIC BLOOD PRESSURE: 138 MMHG | DIASTOLIC BLOOD PRESSURE: 78 MMHG | HEIGHT: 64 IN | RESPIRATION RATE: 18 BRPM | HEART RATE: 63 BPM | TEMPERATURE: 97.6 F | BODY MASS INDEX: 26.22 KG/M2 | OXYGEN SATURATION: 96 %

## 2018-07-30 DIAGNOSIS — E55.9 VITAMIN D DEFICIENCY: ICD-10-CM

## 2018-07-30 DIAGNOSIS — D51.0 PERNICIOUS ANEMIA: ICD-10-CM

## 2018-07-30 DIAGNOSIS — D50.8 OTHER IRON DEFICIENCY ANEMIA: ICD-10-CM

## 2018-07-30 DIAGNOSIS — Z09 FOLLOW-UP EXAMINATION FOLLOWING SURGERY: Primary | ICD-10-CM

## 2018-07-30 DIAGNOSIS — R13.19 INTERMITTENT DYSPHAGIA: ICD-10-CM

## 2018-07-30 DIAGNOSIS — K91.2 POSTOPERATIVE INTESTINAL MALABSORPTION: ICD-10-CM

## 2018-07-30 NOTE — MR AVS SNAPSHOT
[address] [address] [phone] Patient: Deysi Keller MRN: [id number] ZIM:2/02/1863 Visit Information Date & Time Provider Department Dept. Phone Encounter #  
 7/30/2018  9:00 AM Zia Barfield NP [facility] GENERAL SURGERY [address] [phone] [id number] Your Appointments 8/10/2018  9:30 AM  
ROUTINE CARE with MD Inocencio Razamond Diabetes and Endocrinology [facility] Appt Note: 6 month f/u  
 [address] [place] [address]  
[address] [address] [place] [address] Upcoming Health Maintenance Date Due  
 PAP AKA CERVICAL CYTOLOGY 4/1/2018 Influenza Age 5 to Adult 8/1/2018 BREAST CANCER SCRN MAMMOGRAM 12/4/2019 COLONOSCOPY 3/20/2023 DTaP/Tdap/Td series (2 - Td) 4/7/2025 Allergies as of 7/30/2018  Review Complete On: 7/30/2018 By: Karyle Gent, LPN Severity Noted Reaction Type Reactions Codeine  03/23/2011    Hives Tolerates Dilaudid Current Immunizations  Reviewed on 5/2/2016 Name Date Influenza Vaccine 10/1/2015 Influenza Vaccine Split 11/9/2012 10:52 AM  
 Tdap 4/7/2015 Zoster Vaccine, Live 4/23/2016 Not reviewed this visit You Were Diagnosed With   
  
 Codes Comments Pernicious anemia    -  Primary ICD-10-CM: D51.0 ICD-9-CM: 281.0 Vitamin D deficiency     ICD-10-CM: E55.9 ICD-9-CM: 268.9 Other iron deficiency anemia     ICD-10-CM: D50.8 ICD-9-CM: 280.8 Postoperative intestinal malabsorption     ICD-10-CM: K91.2 ICD-9-CM: 579.3 Intermittent dysphagia     ICD-10-CM: R13.19 ICD-9-CM: 787.29 Vitals BP Pulse Temp Resp Height(growth percentile) Weight(growth percentile) 138/78 (BP 1 Location: Left arm, BP Patient Position: Sitting) 63 97.6 °F (36.4 °C) (Oral) 18 5' 4\" (1.626 m) 153 lb 9.6 oz (69.7 kg) SpO2 BMI OB Status Smoking Status 96% 26.37 kg/m2 Hysterectomy Former Smoker Vitals History BMI and BSA Data Body Mass Index Body Surface Area  
 26.37 kg/m 2 1.77 m 2 Preferred Pharmacy Pharmacy Name [facility] [phone] Your Updated Medication List  
  
   
This list is accurate as of 7/30/18  9:43 AM.  Always use your most recent med list.  
  
  
  
  
 calcium carbonate 600 mg calcium (1,500 mg) tablet Commonly known as:  Duaine Orchard Take 1 Tab by mouth daily. cholecalciferol 1,000 unit tablet Commonly known as:  VITAMIN D3 Take 1 Tab by mouth daily. esomeprazole 40 mg capsule Commonly known as:  Lana Calkin Take 40 mg by mouth as needed. MELATONIN PO Take 5 mg by mouth nightly as needed. psyllium 0.52 gram capsule Commonly known as:  METAMUCIL Take 1 Cap by mouth daily. STOOL SOFTENER PO Take 1 Tab by mouth daily. VITAMIN B-12 1,000 mcg tablet Generic drug:  cyanocobalamin Take 1,000 mcg by mouth daily. WOMEN'S MULTIVITAMIN 18 mg iron-400 mcg-500 mg Tab Generic drug:  mv-mn-iron-FA-Ca carb-vit K Take 1 Tab by mouth daily. We Performed the Following CBC W/O DIFF [55757 CPT(R)] IRON [id number] CPT(R)] METABOLIC PANEL, COMPREHENSIVE [10497 CPT(R)] VITAMIN B12 & FOLATE [84106 CPT(R)] VITAMIN D, 25 HYDROXY [id number] CPT(R)] Patient Instructions Try taking the Nexium every other day If you start having reflux, resume daily Stay on your vitamins Labs when you see the endocrinologist  
 
Continue with your vegetables and moist meats Introducing \[facility]\"" & HEALTH SERVICES! Dear Jada Barrientos: Thank you for requesting a [employer] account. Our records indicate that you already have an active [employer] account. You can access your account anytime at [email or web address] Did you know that you can access your hospital and ER discharge instructions at any time in [employer]? You can also review all of your test results from your hospital stay or ER visit. Additional Information If you have questions, please visit the Frequently Asked Questions section of the [employer] website at [email or web address] Remember, [employer] is NOT to be used for urgent needs. For medical emergencies, dial 911. Now available from your iPhone and Android! Please provide this summary of care documentation to your next provider. Your primary care clinician is listed as MARTINEZ HANCOCK. If you have any questions after today's visit, please call [phone].

## 2018-07-30 NOTE — PATIENT INSTRUCTIONS
Try taking the Nexium every other day    If you start having reflux, resume daily     Stay on your vitamins     Labs when you see the endocrinologist     Continue with your vegetables and moist meats

## 2018-07-30 NOTE — PROGRESS NOTES
1. Have you been to the ER, urgent care clinic since your last visit? Hospitalized since your last visit? No    2. Have you seen or consulted any other health care providers outside of the [address] [place] [address] since your last visit? Include any pap smears or colon screening. No    complains of trouble with meats. \"I can eat a couple of bites then I throw up the meat with mucus and its very painful. \"

## 2018-08-10 ENCOUNTER — OFFICE VISIT (OUTPATIENT)
Dept: ENDOCRINOLOGY | Age: 63
End: 2018-08-10

## 2018-08-10 VITALS
DIASTOLIC BLOOD PRESSURE: 70 MMHG | HEART RATE: 58 BPM | HEIGHT: 64 IN | SYSTOLIC BLOOD PRESSURE: 141 MMHG | BODY MASS INDEX: 26.43 KG/M2 | WEIGHT: 154.8 LBS

## 2018-08-10 DIAGNOSIS — M80.00XS OSTEOPOROSIS WITH CURRENT PATHOLOGICAL FRACTURE, UNSPECIFIED OSTEOPOROSIS TYPE, SEQUELA: ICD-10-CM

## 2018-08-10 DIAGNOSIS — R79.89 ELEVATED TSH: ICD-10-CM

## 2018-08-10 DIAGNOSIS — Z86.39 H/O HYPERPARATHYROIDISM: Primary | ICD-10-CM

## 2018-08-10 DIAGNOSIS — E55.9 VITAMIN D DEFICIENCY: ICD-10-CM

## 2018-08-10 RX ORDER — ALENDRONATE SODIUM 70 MG/1
70 TABLET ORAL
Qty: 12 TAB | Refills: 3 | Status: SHIPPED | OUTPATIENT
Start: 2018-08-10 | End: 2019-02-11

## 2018-08-10 NOTE — PATIENT INSTRUCTIONS
Alendronate (By mouth)   Alendronate (w-URD-xrjh-candelaria)  Treats or prevents osteoporosis. Also treats Paget disease of the bone. Brand Name(s): Binosto Fosamax   There may be other brand names for this medicine. When This Medicine Should Not Be Used: This medicine is not right for everyone. Do not use it if you had an allergic reaction to alendronate, or if you have esophagus problems or trouble swallowing. Do not use it if you cannot stand or sit upright for at least 30 minutes after you take the medicine. How to Use This Medicine:   Liquid, Tablet, Fizzy Tablet  · Take this medicine in the morning on an empty stomach. Follow the directions exactly to lower the risk of esophagus problems. · Sit or stand while you take this medicine. Do not lie down for at least 30 minutes after you take the medicine, and do not lie down until after you have eaten. · Use plain water to take your medicine. The medicine may not work as well if you use other liquids. ¨ Tablet: Swallow whole with 6 to 8 ounces of water. Do not chew or suck on the tablet. ¨ Oral liquid: Measure the oral liquid medicine with a marked measuring spoon, oral syringe, or medicine cup. Drink at least 2 ounces (1/4 cup) of water after you take the liquid medicine. ¨ Effervescent tablet: Dissolve the tablet in 4 ounces of room temperature water. Wait at least 5 minutes after the bubbling stops. Then stir the solution for 10 seconds and drink it. · Wait at least 30 minutes after you take this medicine before you eat or drink or take any other medicine. This will help your body absorb the medicine. · This medicine should come with a Medication Guide. Ask your pharmacist for a copy if you do not have one. · Missed dose: Take a dose the next morning. Do not take 2 tablets on the same day. Then go back to your regular schedule. · Store the medicine in a closed container at room temperature, away from heat, moisture, and direct light.  Keep the effervescent tablets in the blister pack until you are ready to use them. Drugs and Foods to Avoid:   Ask your doctor or pharmacist before using any other medicine, including over-the-counter medicines, vitamins, and herbal products. · Some medicines can affect how alendronate works. Tell your doctor if you are using any of the following:   ¨ Cancer medicines  ¨ NSAID pain or arthritis medicine (including aspirin, celecoxib, diclofenac, ibuprofen, naproxen)  ¨ Steroid medicine (including as hydrocortisone, methylprednisolone, prednisone, prednisolone, dexamethasone)  · Take alendronate at least 30 minutes before you take any other oral medicine, including aluminum, magnesium, iron, or calcium supplements, or antacids. Warnings While Using This Medicine:   · Tell your doctor if you are pregnant or breastfeeding, or if you have kidney disease, heartburn, anemia, blood clotting problems, ulcers or other stomach or bowel problems, a vitamin D deficiency, or a history of cancer. Tell your doctor if you have dental problems or if you wear dentures. Also tell your doctor if you smoke or drink alcohol. · This medicine may cause the following problems:   ¨ Damage to your esophagus  ¨ Increased risk for a thigh bone fracture  ¨ Low calcium levels  · Tell any doctor or dentist who treats you that you are using this medicine. This medicine may cause jaw problems, especially if you have a tooth pulled or other dental work. · The effervescent tablet contains sodium. Tell your doctor if you are on a low-salt diet. · Your doctor will check your progress and the effects of this medicine at regular visits. Keep all appointments. · Keep all medicine out of the reach of children. Never share your medicine with anyone.   Possible Side Effects While Using This Medicine:   Call your doctor right away if you notice any of these side effects:  · Allergic reaction: Itching or hives, swelling in your face or hands, swelling or tingling in your mouth or throat, chest tightness, trouble breathing  · Blistering, peeling, red skin rash  · Chest pain, new or worsening heartburn, or a burning feeling in your throat  · Muscle spasms or twitching, tingling or numbness in your fingers, toes, or around your mouth  · Pain or difficulty when swallowing  · Pain, swelling, numbness, or a heavy feeling in your mouth or jaw, loose teeth, or other tooth problems  · Severe bone, joint, or muscle pain  · Unusual pain in your thigh, groin, or hip  If you notice these less serious side effects, talk with your doctor:   · Mild stomach pain or upset  If you notice other side effects that you think are caused by this medicine, tell your doctor. Call your doctor for medical advice about side effects. You may report side effects to FDA at 5-044-FDA-8375  © 2017 [facility] Information is for End User's use only and may not be sold, redistributed or otherwise used for commercial purposes. The above information is an  only. It is not intended as medical advice for individual conditions or treatments. Talk to your doctor, nurse or pharmacist before following any medical regimen to see if it is safe and effective for you.

## 2018-08-10 NOTE — MR AVS SNAPSHOT
[address] 
[phone] Patient: Mary Cuevas MRN: [id number] [facility]:[birth year] Visit Information Date & Time Provider Department Dept. Phone Encounter #  
 8/10/2018  9:30 AM Valentino Rummer, [address] [facility] Diabetes and Endocrinology [phone] [id number] Follow-up Instructions Return in about 6 months (around 2/10/2019). Your Appointments 10/29/2018  8:00 AM  
ESTABLISHED PATIENT with Christiano Edwards NP  
[place] [address] ([facility]) Appt Note: EP; 3mo. F/U w/Kirstin; [facility]; 7/30/18  
 [address] [facility] [address]  
[address] [place] [address] Upcoming Health Maintenance Date Due  
 PAP AKA CERVICAL CYTOLOGY 4/1/2018 Influenza Age 5 to Adult 8/1/2018 BREAST CANCER SCRN MAMMOGRAM 12/4/2019 COLONOSCOPY 3/20/2023 DTaP/Tdap/Td series (2 - Td) 4/7/2025 Allergies as of 8/10/2018  Review Complete On: 8/9/2018 By: Christiano Edwards NP Severity Noted Reaction Type Reactions Codeine  03/23/2011    Hives Tolerates Dilaudid Current Immunizations  Reviewed on 5/2/2016 Name Date Influenza Vaccine 10/1/2015 Influenza Vaccine Split 11/9/2012 10:52 AM  
 Tdap 4/7/2015 Zoster Vaccine, Live 4/23/2016 Not reviewed this visit You Were Diagnosed With   
  
 Codes Comments H/O hyperparathyroidism    -  Primary ICD-10-CM: Z86.39 
ICD-9-CM: V12.29 Elevated TSH     ICD-10-CM: R94.6 ICD-9-CM: 794.5 Vitals BP Pulse Height(growth percentile) Weight(growth percentile) BMI OB Status 141/70 (!) 58 5' 4\" (1.626 m) 154 lb 12.8 oz (70.2 kg) 26.57 kg/m2 Hysterectomy Smoking Status Former Smoker Vitals History BMI and BSA Data  Body Mass Index Body Surface Area  
 26.57 kg/m 2 1.78 m 2  
  
  
 Preferred Pharmacy Pharmacy Name Phone [facility] [phone] Your Updated Medication List  
  
   
This list is accurate as of 8/10/18  9:31 AM.  Always use your most recent med list.  
  
  
  
  
 alendronate 70 mg tablet Commonly known as:  FOSAMAX Take 1 Tab by mouth every seven (7) days. calcium carbonate 600 mg calcium (1,500 mg) tablet Commonly known as:  Link Perking Take 1 Tab by mouth daily. cholecalciferol 1,000 unit tablet Commonly known as:  VITAMIN D3 Take 1 Tab by mouth daily. esomeprazole 40 mg capsule Commonly known as:  Arleene Kayode Take 40 mg by mouth as needed. MELATONIN PO Take 5 mg by mouth nightly as needed. psyllium 0.52 gram capsule Commonly known as:  METAMUCIL Take 1 Cap by mouth daily. STOOL SOFTENER PO Take 2 Tabs by mouth daily. VITAMIN B-12 1,000 mcg tablet Generic drug:  cyanocobalamin Take 1,000 mcg by mouth daily. WOMEN'S MULTIVITAMIN 18 mg iron-400 mcg-500 mg Tab Generic drug:  mv-mn-iron-FA-Ca carb-vit K Take 1 Tab by mouth daily. Prescriptions Printed Refills  
 alendronate (FOSAMAX) 70 mg tablet 3 Sig: Take 1 Tab by mouth every seven (7) days. Class: Print Route: Oral  
  
We Performed the Following PTH INTACT [93382 CPT(R)] TSH AND FREE T4 [41415 CPT(R)] Follow-up Instructions Return in about 6 months (around 2/10/2019). Patient Instructions Alendronate (By mouth) Alendronate (i-KFG-qndd-candelaria) Treats or prevents osteoporosis. Also treats Paget disease of the bone. Brand Name(s): Binosto, Fosamax There may be other brand names for this medicine. When This Medicine Should Not Be Used: This medicine is not right for everyone.  Do not use it if you had an allergic reaction to alendronate, or if you have esophagus problems or trouble swallowing. Do not use it if you cannot stand or sit upright for at least 30 minutes after you take the medicine. How to Use This Medicine:  
Liquid, Tablet, Fizzy Tablet · Take this medicine in the morning on an empty stomach. Follow the directions exactly to lower the risk of esophagus problems. · Sit or stand while you take this medicine. Do not lie down for at least 30 minutes after you take the medicine, and do not lie down until after you have eaten. · Use plain water to take your medicine. The medicine may not work as well if you use other liquids. ¨ Tablet: Swallow whole with 6 to 8 ounces of water. Do not chew or suck on the tablet. ¨ Oral liquid: Measure the oral liquid medicine with a marked measuring spoon, oral syringe, or medicine cup. Drink at least 2 ounces (1/4 cup) of water after you take the liquid medicine. ¨ Effervescent tablet: Dissolve the tablet in 4 ounces of room temperature water. Wait at least 5 minutes after the bubbling stops. Then stir the solution for 10 seconds and drink it. · Wait at least 30 minutes after you take this medicine before you eat or drink or take any other medicine. This will help your body absorb the medicine. · This medicine should come with a Medication Guide. Ask your pharmacist for a copy if you do not have one. · Missed dose: Take a dose the next morning. Do not take 2 tablets on the same day. Then go back to your regular schedule. · Store the medicine in a closed container at room temperature, away from heat, moisture, and direct light. Keep the effervescent tablets in the blister pack until you are ready to use them. Drugs and Foods to Avoid: Ask your doctor or pharmacist before using any other medicine, including over-the-counter medicines, vitamins, and herbal products. · Some medicines can affect how alendronate works. Tell your doctor if you are using any of the following:  
¨ Cancer medicines ¨ NSAID pain or arthritis medicine (including aspirin, celecoxib, diclofenac, ibuprofen, naproxen) ¨ Steroid medicine (including as hydrocortisone, methylprednisolone, prednisone, prednisolone, dexamethasone) · Take alendronate at least 30 minutes before you take any other oral medicine, including aluminum, magnesium, iron, or calcium supplements, or antacids. Warnings While Using This Medicine: · Tell your doctor if you are pregnant or breastfeeding, or if you have kidney disease, heartburn, anemia, blood clotting problems, ulcers or other stomach or bowel problems, a vitamin D deficiency, or a history of cancer. Tell your doctor if you have dental problems or if you wear dentures. Also tell your doctor if you smoke or drink alcohol. · This medicine may cause the following problems: ¨ Damage to your esophagus ¨ Increased risk for a thigh bone fracture ¨ Low calcium levels · Tell any doctor or dentist who treats you that you are using this medicine. This medicine may cause jaw problems, especially if you have a tooth pulled or other dental work. · The effervescent tablet contains sodium. Tell your doctor if you are on a low-salt diet. · Your doctor will check your progress and the effects of this medicine at regular visits. Keep all appointments. · Keep all medicine out of the reach of children. Never share your medicine with anyone. Possible Side Effects While Using This Medicine:  
Call your doctor right away if you notice any of these side effects: · Allergic reaction: Itching or hives, swelling in your face or hands, swelling or tingling in your mouth or throat, chest tightness, trouble breathing · Blistering, peeling, red skin rash · Chest pain, new or worsening heartburn, or a burning feeling in your throat · Muscle spasms or twitching, tingling or numbness in your fingers, toes, or around your mouth · Pain or difficulty when swallowing · Pain, swelling, numbness, or a heavy feeling in your mouth or jaw, loose teeth, or other tooth problems · Severe bone, joint, or muscle pain · Unusual pain in your thigh, groin, or hip If you notice these less serious side effects, talk with your doctor: · Mild stomach pain or upset If you notice other side effects that you think are caused by this medicine, tell your doctor. Call your doctor for medical advice about side effects. You may report side effects to FDA at 9-073-LZI-6838 © 2017 [address] Information is for End User's use only and may not be sold, redistributed or otherwise used for commercial purposes. The above information is an  only. It is not intended as medical advice for individual conditions or treatments. Talk to your doctor, nurse or pharmacist before following any medical regimen to see if it is safe and effective for you. Introducing [facility]! Dear Dannie Damon: Thank you for requesting a Bunch account. Our records indicate that you already have an active Bunch account. You can access your account anytime at [email or web address] Did you know that you can access your hospital and ER discharge instructions at any time in Bunch? You can also review all of your test results from your hospital stay or ER visit. Additional Information If you have questions, please visit the Frequently Asked Questions section of the Bunch website at [email or web address] Remember, Bunch is NOT to be used for urgent needs. For medical emergencies, dial 911. Now available from your iPhone and Android! Please provide this summary of care documentation to your next provider. Your primary care clinician is listed as MARTINEZ HANCOCK. If you have any questions after today's visit, please call [phone].

## 2018-08-11 LAB
25(OH)D3+25(OH)D2 SERPL-MCNC: 48.2 NG/ML (ref 30–100)
ALBUMIN SERPL-MCNC: 4.3 G/DL (ref 3.6–4.8)
ALBUMIN/GLOB SERPL: 1.7 {RATIO} (ref 1.2–2.2)
ALP SERPL-CCNC: 55 IU/L (ref 39–117)
ALT SERPL-CCNC: 10 IU/L (ref 0–32)
AST SERPL-CCNC: 12 IU/L (ref 0–40)
BILIRUB SERPL-MCNC: 0.4 MG/DL (ref 0–1.2)
BUN SERPL-MCNC: 8 MG/DL (ref 8–27)
BUN/CREAT SERPL: 16 (ref 12–28)
CALCIUM SERPL-MCNC: 9.3 MG/DL (ref 8.7–10.3)
CHLORIDE SERPL-SCNC: 105 MMOL/L (ref 96–106)
CO2 SERPL-SCNC: 24 MMOL/L (ref 20–29)
CREAT SERPL-MCNC: 0.49 MG/DL (ref 0.57–1)
ERYTHROCYTE [DISTWIDTH] IN BLOOD BY AUTOMATED COUNT: 20.5 % (ref 12.3–15.4)
FOLATE SERPL-MCNC: >20 NG/ML
GLOBULIN SER CALC-MCNC: 2.6 G/DL (ref 1.5–4.5)
GLUCOSE SERPL-MCNC: 90 MG/DL (ref 65–99)
HCT VFR BLD AUTO: 38.4 % (ref 34–46.6)
HGB BLD-MCNC: 12.2 G/DL (ref 11.1–15.9)
IRON SERPL-MCNC: 66 UG/DL (ref 27–139)
MCH RBC QN AUTO: 24.3 PG (ref 26.6–33)
MCHC RBC AUTO-ENTMCNC: 31.8 G/DL (ref 31.5–35.7)
MCV RBC AUTO: 77 FL (ref 79–97)
PLATELET # BLD AUTO: 299 X10E3/UL (ref 150–379)
POTASSIUM SERPL-SCNC: 5.1 MMOL/L (ref 3.5–5.2)
PROT SERPL-MCNC: 6.9 G/DL (ref 6–8.5)
PTH-INTACT SERPL-MCNC: 45 PG/ML (ref 15–65)
RBC # BLD AUTO: 5.02 X10E6/UL (ref 3.77–5.28)
SODIUM SERPL-SCNC: 144 MMOL/L (ref 134–144)
T4 FREE SERPL-MCNC: 1.04 NG/DL (ref 0.82–1.77)
TSH SERPL DL<=0.005 MIU/L-ACNC: 2.16 UIU/ML (ref 0.45–4.5)
VIT B12 SERPL-MCNC: 606 PG/ML (ref 232–1245)
WBC # BLD AUTO: 6.6 X10E3/UL (ref 3.4–10.8)

## 2018-08-13 NOTE — PROGRESS NOTES
Chief Complaint   Patient presents with    Follow-up     12 weeks s/p Lap resection gastric pouch. Down 27.4lb   Loss 6.4lbs       Pattie Georges is 3 months status post revision / resection of gastric pouch for GERD. Presents today for surgical follow up. Patient has lost 27+ lbs. Since surgery. Patient is satisfied with progress. Patient is consuming about 45-50 grams of protein daily. Patient is drinking 48 oz of fluids per day. Bowels moving most days. She is feeling well overall and energy is good  No fever or chills, chest pain or shortness of breath. No reflux, night-time cough, heartburn or regurgitation. Vitamin compliance yes  Activity  Walking     Physical Exam  Visit Vitals    /78 (BP 1 Location: Left arm, BP Patient Position: Sitting)    Pulse 63    Temp 97.6 °F (36.4 °C) (Oral)    Resp 18    Ht 5' 4\" (1.626 m)    Wt 153 lb 9.6 oz (69.7 kg)    SpO2 96%    BMI 26.37 kg/m2     A + O x 3  Chest  CTA, unlabored   COR  RRR  ABD Soft, well healed, NT/ND, no masses or hernias   EXT No edema; ambulating independently       ICD-10-CM ICD-9-CM    1. Follow-up examination following surgery Z09 V67.00    2. 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 BMI 26.0-26.9,adult Z68.26 V85.22    4. Pernicious anemia D51.0 281.0 VITAMIN D, 25 HYDROXY      VITAMIN B12 & FOLATE      IRON      CBC W/O DIFF      METABOLIC PANEL, COMPREHENSIVE   5. Vitamin D deficiency E55.9 268.9 VITAMIN D, 25 HYDROXY      VITAMIN B12 & FOLATE      IRON      CBC W/O DIFF      METABOLIC PANEL, COMPREHENSIVE   6. Other iron deficiency anemia D50.8 280.8 VITAMIN D, 25 HYDROXY      VITAMIN B12 & FOLATE      IRON      CBC W/O DIFF      METABOLIC PANEL, COMPREHENSIVE   7.  Intermittent dysphagia R13.19 787.29 VITAMIN D, 25 HYDROXY      VITAMIN B12 & FOLATE      IRON      CBC W/O DIFF METABOLIC PANEL, COMPREHENSIVE       Kylie Antis is 3 months s/p revision gastric pouch doing well   Diet RD available and continue with moist meats and veggies   Activity daily   Follow-up in 3 months   Continue vitamins   Labs with endocrine   Support group  Kylie Antis verbalized understanding and questions were answered to the best of my knowledge and ability. Diet and activity  educational materials were provided. 15 minutes spent in face to face with Kylie Antis > 50% counseling.

## 2018-08-24 ENCOUNTER — TELEPHONE (OUTPATIENT)
Dept: ENDOCRINOLOGY | Age: 63
End: 2018-08-24

## 2018-08-24 NOTE — TELEPHONE ENCOUNTER
----- Message from Agus Rice sent at 8/24/2018  8:02 AM EDT -----  Regarding: /Telephone   Pt would like to get her test results. Best contact number is [phone] or  [phone].

## 2018-08-24 NOTE — TELEPHONE ENCOUNTER
Spoke with pt to make her aware that her results were sent to her through [employer]. Pt then stated that she made her provider aware that she no longer uses her [employer] account. Pt's  and name was verified and her result were given. Pt voiced understanding.

## 2018-08-24 NOTE — TELEPHONE ENCOUNTER
Thank you. I automatically send results via [employer] if the account is active. I deactivated her account to avoid confusion in the future. We can mail her a copy of her lab results if she would like to see them.

## 2018-10-20 NOTE — ANESTHESIA POSTPROCEDURE EVALUATION
Procedure(s): LAPAROSCOPIC RESECTION GASTRIC POUCH, REVISION GASTROJEUNOSTOMY, LAPAROSCOPIC NISSEN FUNDOPLICATION AND HIATAL HERNIA REPAIR 
ESOPHAGOGASTRODUODENOSCOPY (EGD). <BSHSIANPOST> Visit Vitals /58 (BP 1 Location: Right arm, BP Patient Position: Sitting) Pulse 83 Temp 37.2 °C (98.9 °F) Resp 16 Ht 5' 4\" (1.626 m) Wt 85.7 kg (188 lb 15 oz) SpO2 93% Breastfeeding? No  
BMI 32.43 kg/m²

## 2018-10-29 ENCOUNTER — OFFICE VISIT (OUTPATIENT)
Dept: SURGERY | Age: 63
End: 2018-10-29

## 2018-10-29 VITALS
TEMPERATURE: 98 F | SYSTOLIC BLOOD PRESSURE: 147 MMHG | OXYGEN SATURATION: 97 % | BODY MASS INDEX: 25.44 KG/M2 | DIASTOLIC BLOOD PRESSURE: 83 MMHG | HEART RATE: 60 BPM | RESPIRATION RATE: 18 BRPM | WEIGHT: 149 LBS | HEIGHT: 64 IN

## 2018-10-29 DIAGNOSIS — R10.13 DYSPEPSIA: ICD-10-CM

## 2018-10-29 DIAGNOSIS — K59.01 SLOW TRANSIT CONSTIPATION: ICD-10-CM

## 2018-10-29 DIAGNOSIS — K91.2 POSTOPERATIVE INTESTINAL MALABSORPTION: Primary | ICD-10-CM

## 2018-10-29 NOTE — PATIENT INSTRUCTIONS
Constipation:    Add Benefiber (store brand is fine) 4 tsp daily to any liquid     Add magnesium 250 mg 1 tab daily       For occasional reflux / heartburn:     Add Zantac 150 mg or Pepcid 40 mg twice daily as needed     Ok to focus on NON-MEAT proteins:  Low fat dairy, legumes (beans, lentils, quinoa, nuts and seeds in limited amounts, eggs)    Schedule an appointment with the dietician, please call or email   Dorothy Chowdhury RD at:    [phone]  [email or web address]

## 2018-10-29 NOTE — PROGRESS NOTES
1. Have you been to the ER, urgent care clinic since your last visit? Hospitalized since your last visit? No    2. Have you seen or consulted any other health care providers outside of the [address] [place] [address] since your last visit? Include any pap smears or colon screening.  No

## 2018-10-29 NOTE — PROGRESS NOTES
Chief Complaint   Patient presents with    Surgical Follow-up     5 months s/p Lap resection gastric pouch. Down 31.5lb   Loss 4lbs       Sarai Ace is 5 months status post revision gastric bypass. Presents today for reflux management. Patient has lost 31.5 lbs. Since surgery. Her GERD symptoms are 95% better. She no longer takes the Nexium. Patient is satisfied with progress. Patient is consuming about 45-50 grams of protein daily. She uses an occasional shake and relies mostly on cottage cheese, yogurt and eggs. She does not tolerate meats (and does not like seafood) since her surgery. Patient is drinking 48 oz of fluids per day. Bowels moving slowly and constipation is an issue. Prunes were effective, but they started \"making her sick\". She is taking 1 fiber tab and a colace with little relief. Stools are hard and she feels like she does not empty well. She has osteoporosis and she is reluctant to start Fosamax due to GERD concerns. Reclast was too costly       Vitamin compliance yes  Activity  Walking         Co-Morbid(s)     Resolved      Was anti coagulation initiated for presumed / confirmed DVT/PE? NO    Was an incisional hernia noted on exam?       NO      COMORBIDITY     SLEEP APNEA                 NO        GERD  (req.meds)           NO  HYPERLIPIDEMIA           NO  HYPERTENSION             NO       IF YES, # OF HTN MEDICATIONS 0  DIABETES                        NO      IF YES, 0 NON-INSULIN   0 INSULIN     Physical Exam  Visit Vitals  /83 (BP 1 Location: Left arm, BP Patient Position: Sitting)   Pulse 60   Temp 98 °F (36.7 °C) (Oral)   Resp 18   Ht 5' 4\" (1.626 m)   Wt 149 lb (67.6 kg)   SpO2 97%   BMI 25.58 kg/m²     A + O x 3, looks well   Chest  CTA, unlabored   COR  RRR  ABD Soft, NT/ND, no masses or hernias   EXT No edema; ambulating independently       ICD-10-CM ICD-9-CM    1. 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  BMI 25.0-25.9,adult Z68.25 V85.21    3. Dyspepsia R10.13 536.8    4. Slow transit constipation K59.01 564.01        Dani Layne is 5 months s/p revision  doing well   Diet reviewed plant proteins and referred to Nancy to add H2 blocker prn dyspepsia  For constipation add benefiber 4 tsp every day and magnesium 250 mg every day  Activity mindfulness and walking   Follow-up in 3 months   Support group  Dani Layne verbalized understanding and questions were answered to the best of my knowledge and ability. constipation educational materials were provided. 17 minutes spent in face to face with Dani Layne > 50% counseling.

## 2018-12-19 ENCOUNTER — OFFICE VISIT (OUTPATIENT)
Dept: INTERNAL MEDICINE CLINIC | Age: 63
End: 2018-12-19

## 2018-12-19 VITALS
SYSTOLIC BLOOD PRESSURE: 132 MMHG | OXYGEN SATURATION: 97 % | HEIGHT: 64 IN | TEMPERATURE: 98.3 F | HEART RATE: 68 BPM | DIASTOLIC BLOOD PRESSURE: 80 MMHG | BODY MASS INDEX: 26.98 KG/M2 | WEIGHT: 158 LBS | RESPIRATION RATE: 16 BRPM

## 2018-12-19 DIAGNOSIS — S13.4XXA WHIPLASH INJURY TO NECK, INITIAL ENCOUNTER: ICD-10-CM

## 2018-12-19 DIAGNOSIS — N13.30 HYDRONEPHROSIS OF RIGHT KIDNEY: ICD-10-CM

## 2018-12-19 DIAGNOSIS — R07.81 PLEURITIC CHEST PAIN: Primary | ICD-10-CM
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
## 2018-12-19 NOTE — LETTER
NOTIFICATION RETURN TO WORK / SCHOOL 
 
12/19/2018 5:53 PM 
 
Ms. Wolf Izquierdo [address] [place] [address] To Whom It May Concern: 
 
Wolf Izquierdo is currently under the care of [facility]. She will return to work on: 12/21/18 If there are questions or concerns please have the patient contact our office. Sincerely, Rosetta Quan MD

## 2018-12-19 NOTE — LETTER
NOTIFICATION RETURN TO WORK  
 
12/19/2018 5:55 PM 
 
Ms. Aleman [facility] [address] [place] [address] To Whom It May Concern: 
 
Due to medical reasons, out of work 12/18/18, 12/19/18 and 12/20/18. Sincerely, Malina Montalvo MD

## 2018-12-19 NOTE — LETTER
12/19/2018 5:42 PM 
 
Ms. Aurea Edwards [address] [place] [address] Due to medical reasons, out of work 12/18/18 and 12/19/18. Sincerely, Oanh Orellana MD
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
## 2018-12-28 ENCOUNTER — TELEPHONE (OUTPATIENT)
Dept: INTERNAL MEDICINE CLINIC | Age: 63
End: 2018-12-28

## 2019-01-02 ENCOUNTER — TELEPHONE (OUTPATIENT)
Dept: INTERNAL MEDICINE CLINIC | Age: 64
End: 2019-01-02

## 2019-01-02 NOTE — TELEPHONE ENCOUNTER
Spoke with pt - states the echo that was ordered on 12/19/18 - no one has called her to schedule. Offered to schedule for her - she has work schedule issues. Advised her I would call scheduling to have them call her. She also wanted to know if MD would order PT for her for her back pain? She states [facility] would be good place for her due to she works at [employer]. Will forward to MD.    Called Coordination of care - spoke to HEIDI - advised her pt needs to be scheduled for echo. She will call pt now to get pt schedule.

## 2019-01-02 NOTE — TELEPHONE ENCOUNTER
[phone]    The patient has not heard from the echo that she needs of her heart. No one has called her yet.  The patient back is no better can she get PT

## 2019-01-07 ENCOUNTER — TELEPHONE (OUTPATIENT)
Dept: INTERNAL MEDICINE CLINIC | Age: 64
End: 2019-01-07

## 2019-01-07 DIAGNOSIS — M54.89 OTHER BACK PAIN, UNSPECIFIED CHRONICITY: Primary | ICD-10-CM
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
## 2019-01-08 ENCOUNTER — TELEPHONE (OUTPATIENT)
Dept: INTERNAL MEDICINE CLINIC | Age: 64
End: 2019-01-08

## 2019-01-08 ENCOUNTER — HOSPITAL ENCOUNTER (OUTPATIENT)
Dept: NON INVASIVE DIAGNOSTICS | Age: 64
Discharge: HOME | End: 2019-01-08
Attending: INTERNAL MEDICINE
Payer: COMMERCIAL

## 2019-01-08 DIAGNOSIS — R07.81 PLEURITIC CHEST PAIN: ICD-10-CM

## 2019-01-08 PROCEDURE — 93306 TTE W/DOPPLER COMPLETE: CPT

## 2019-01-21 ENCOUNTER — HOSPITAL ENCOUNTER (OUTPATIENT)
Dept: PHYSICAL THERAPY | Age: 64
Discharge: HOME | End: 2019-01-21
Payer: COMMERCIAL

## 2019-01-21 PROCEDURE — 97110 THERAPEUTIC EXERCISES: CPT

## 2019-01-21 PROCEDURE — 97161 PT EVAL LOW COMPLEX 20 MIN: CPT
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
## 2019-01-24 ENCOUNTER — HOSPITAL ENCOUNTER (OUTPATIENT)
Dept: PHYSICAL THERAPY | Age: 64
Discharge: HOME | End: 2019-01-24
Payer: COMMERCIAL

## 2019-01-24 PROCEDURE — 97140 MANUAL THERAPY 1/> REGIONS: CPT

## 2019-01-24 PROCEDURE — 97110 THERAPEUTIC EXERCISES: CPT

## 2019-01-24 NOTE — PROGRESS NOTES
PT DAILY TREATMENT NOTE - [facility] 2-15 Patient Name: Jvai Baig Date:2019 : 1955 [x]  Patient  Verified Payor: BLUE CROSS / Plan: [facility] PPO / Product Type: PPO / In time:8:05am  Out time:9:05am 
Total Treatment Time (min): 60 Total Timed Codes (min): 50 
1:1 Treatment Time ( only): 30 Visit #: 2 Treatment Area: Low back pain [M54.5] Neck pain [M54.2] SUBJECTIVE Pain Level (0-10 scale): 3/10 Any medication changes, allergies to medications, adverse drug reactions, diagnosis change, or new procedure performed?: [x] No    [] Yes (see summary sheet for update) Subjective functional status/changes:   [] No changes reported Pt reports compliance with HEP. Pt reports feeling a lot of \"discomfort\" this morning; right UT and shoulder is bothering her more than left side. OBJECTIVE Modality rationale: decrease pain and increase tissue extensibility to improve the patients ability to perform functional activities with increase in pain Type Additional Details  
[] Estim: []Att   []Unatt    []TENS instruct []IFC  []Premod   []NMES []Other:  []w/US   []w/ice   []w/heat Position: Location:  
[]  Traction: [] Cervical       []Lumbar 
                     [] Prone          []Supine []Intermittent   []Continuous Lbs: 
[] before manual 
[] after manual 
[]w/heat  
[]  Ultrasound: []Continuous   [] Pulsed  
                    at: []1MHz   []3MHz Location: 
W/cm2:  
[] Paraffin Location:  
[]w/heat  
[]  Ice     [x]  Heat x 10 minutes 
[]  Ice massage Position: supine with LEs supported on wedge Location: neck and back at beginning of session  
[]  Laser 
[]  Other: Position: Location:  
[]  Vasopneumatic Device Pressure:       [] lo [] med [] hi  
Temperature:   
[x] Skin assessment post-treatment:  [x]intact []redness- no adverse reaction 
  []redness  adverse reaction: 35 min Therapeutic Exercise:  [x] See flow sheet :  
Rationale: increase ROM, increase strength and improve coordination to improve the patients ability to perform functional activities with increased ease 15 min Manual Therapy: STM bilateral UTs and cervical paraspinals; TPR right UT; upglide to lower and mid-cervical spine grade II and III; PROM cervical side-bending left/right and rotation left/right; gentle cervical traction; right scapula mobilization: inferior glide and distraction grade II and III Rationale: decrease pain, increase ROM, increase tissue extensibility, decrease trigger points and increase postural awareness to improve the patients ability to perform functional activities with increased ease With 
 [] TE 
 [] TA 
 [] neuro 
 [] other: Patient Education: [x] Review HEP- pt given updated handout with exercises for HEP [] Progressed/Changed HEP based on:  
[] positioning   [] body mechanics   [] transfers   [] heat/ice application   
[] other:   
 
Other Objective/Functional Measures: --  
 
Pain Level (0-10 scale) post treatment: 1/10 ASSESSMENT/Changes in Function:  
Pt responded favorably to manual techniques. Patient will continue to benefit from skilled PT services to modify and progress therapeutic interventions, address functional mobility deficits, address ROM deficits, address strength deficits, analyze and address soft tissue restrictions, analyze and cue movement patterns, analyze and modify body mechanics/ergonomics and assess and modify postural abnormalities to attain remaining goals. []  See Plan of Care 
[]  See progress note/recertification 
[]  See Discharge Summary Progress towards goals / Updated goals: 
Short Term Goals: To be accomplished in 3 weeks: 
1) Pt will be independent with HEP. 2) Pt will be able to sit with upright posture >/= 5 minutes without increase of symptoms. 3) Pt will report improvement in cervical Rotation to look over shoulders while driving. 
  
Long Term Goals: To be accomplished in 6 weeks: 
1) Pt will be able to read without increase of neck pain. 2) Pt will be able to look over shoulders while driving without increase of neck pain. 3) Pt will demonstrate ability to lift >/= 20 lbs without increase of symptoms. 4) Pt will be able to sleep through the night without waking in pain. 5) Pt will be able to rise from supine to sitting without head lag or pain. 
  
PLAN [x]  Upgrade activities as tolerated     [x]  Continue plan of care 
[]  Update interventions per flow sheet      
[]  Discharge due to:_ 
[]  Other:_   
 
Katiuska Barba, PT, DPT  1/24/2019

## 2019-01-29 ENCOUNTER — HOSPITAL ENCOUNTER (OUTPATIENT)
Dept: PHYSICAL THERAPY | Age: 64
Discharge: HOME | End: 2019-01-29
Payer: COMMERCIAL

## 2019-01-29 PROCEDURE — 97140 MANUAL THERAPY 1/> REGIONS: CPT

## 2019-01-29 PROCEDURE — 97110 THERAPEUTIC EXERCISES: CPT

## 2019-01-29 NOTE — PROGRESS NOTES
PT DAILY TREATMENT NOTE - [facility] 2-15 Patient Name: Carolyn Zabala Date:2019 : 1955 [x]  Patient  Verified Payor: BLUE CROSS / Plan: [facility] PPO / Product Type: PPO / In time:8:30am  Out time:9:30am 
Total Treatment Time (min): 60 Total Timed Codes (min): 60 
1:1 Treatment Time ( only): 30 Visit #: 3 Treatment Area: Back pain [M54.9] SUBJECTIVE Pain Level (0-10 scale): 3/10 Any medication changes, allergies to medications, adverse drug reactions, diagnosis change, or new procedure performed?: [x] No    [] Yes (see summary sheet for update) Subjective functional status/changes:   [] No changes reported Pt reports feeling about the same. Pt complains of \"a lot of popping\" in neck, which is not painful but concerning to her. Pt states that right UT/neck pain bothers her more than left. OBJECTIVE 45 min Therapeutic Exercise:  [x] See flow sheet :  
Rationale: increase ROM, increase strength and improve coordination to improve the patients ability to perform functional activities with increased ease 15 min Manual Therapy: STM bilateral UTs and cervical paraspinals; upglide to lower and mid-cervical spine grade II and III; PROM cervical side-bending left/right and rotation left/right; gentle cervical traction; suboccipital inhibition Rationale: decrease pain, increase ROM, increase tissue extensibility, decrease trigger points and increase postural awareness to improve the patients ability to perform functional activities with increased ease With 
 [] TE 
 [] TA 
 [] neuro 
 [] other: Patient Education: [x] Review HEP- pt given updated handout with exercises for HEP [] Progressed/Changed HEP based on:  
[] positioning   [] body mechanics   [] transfers   [] heat/ice application   
[] other:   
 
Other Objective/Functional Measures: --  
 
Pain Level (0-10 scale) post treatment: 1/10 ASSESSMENT/Changes in Function: Pt demonstrated improved pain-free cervical ROM at end of session. Added upper trapezius stretch and pec stretch to HEP. Pt has met all short-term PT goals. Patient will continue to benefit from skilled PT services to modify and progress therapeutic interventions, address functional mobility deficits, address ROM deficits, address strength deficits, analyze and address soft tissue restrictions, analyze and cue movement patterns, analyze and modify body mechanics/ergonomics and assess and modify postural abnormalities to attain remaining goals. []  See Plan of Care 
[]  See progress note/recertification 
[]  See Discharge Summary Progress towards goals / Updated goals: 
Short Term Goals: To be accomplished in 3 weeks: 
1) Pt will be independent with HEP. MET 
2) Pt will be able to sit with upright posture >/= 5 minutes without increase of symptoms. MET 
3) Pt will report improvement in cervical Rotation to look over shoulders while driving. MET 
  
Long Term Goals: To be accomplished in 6 weeks: 
1) Pt will be able to read without increase of neck pain. 2) Pt will be able to look over shoulders while driving without increase of neck pain. 3) Pt will demonstrate ability to lift >/= 20 lbs without increase of symptoms. 4) Pt will be able to sleep through the night without waking in pain. 5) Pt will be able to rise from supine to sitting without head lag or pain. 
  
PLAN [x]  Upgrade activities as tolerated     [x]  Continue plan of care 
[]  Update interventions per flow sheet      
[]  Discharge due to:_ 
[]  Other:_   
 
Mita Whiting, PT, DPT  1/29/2019

## 2019-01-31 ENCOUNTER — HOSPITAL ENCOUNTER (OUTPATIENT)
Dept: PHYSICAL THERAPY | Age: 64
Discharge: HOME | End: 2019-01-31
Payer: COMMERCIAL

## 2019-01-31 PROCEDURE — 97110 THERAPEUTIC EXERCISES: CPT

## 2019-01-31 PROCEDURE — 97140 MANUAL THERAPY 1/> REGIONS: CPT

## 2019-01-31 NOTE — PROGRESS NOTES
PT DAILY TREATMENT NOTE - [facility] 2-15 Patient Name: Joycelyn Johnson Date:2019 : 1955 [x]  Patient  Verified Payor: BLUE CROSS / Plan: [facility] PPO / Product Type: PPO / In time: 1:55pm  Out time: 3:00pm 
Total Treatment Time (min): 65 Total Timed Codes (min): 65 
1:1 Treatment Time ([address] only): 65 Visit #: 3 Treatment Area: Back pain [M54.9] SUBJECTIVE Pain Level (0-10 scale): 10 Any medication changes, allergies to medications, adverse drug reactions, diagnosis change, or new procedure performed?: [x] No    [] Yes (see summary sheet for update) Subjective functional status/changes:   [] No changes reported Pt reports continued pain but \"less intense. \". 
 
OBJECTIVE 50 min Therapeutic Exercise:  [x] See flow sheet :  
Rationale: increase ROM, increase strength and improve coordination to improve the patients ability to perform functional activities with increased ease 15 min Manual Therapy: STM bilateral UTs and cervical paraspinals; upglide to lower and mid-cervical spine grade II and III; PROM cervical side-bending left/right and rotation left/right; gentle cervical traction; suboccipital inhibition Rationale: decrease pain, increase ROM, increase tissue extensibility, decrease trigger points and increase postural awareness to improve the patients ability to perform functional activities with increased ease With 
 [] TE 
 [] TA 
 [] neuro 
 [] other: Patient Education: [x] Review HEP- pt given updated handout with exercises for HEP [] Progressed/Changed HEP based on:  
[] positioning   [] body mechanics   [] transfers   [] heat/ice application   
[] other:   
 
Other Objective/Functional Measures: --  
 
Pain Level (0-10 scale) post treatment: 1/10 ASSESSMENT/Changes in Function:  
Pt educated about postural awareness and correction. Pt denies modalities at end of session; prefers to use electric heating pad at home.   Patient will continue to benefit from skilled PT services to modify and progress therapeutic interventions, address functional mobility deficits, address ROM deficits, address strength deficits, analyze and address soft tissue restrictions, analyze and cue movement patterns, analyze and modify body mechanics/ergonomics and assess and modify postural abnormalities to attain remaining goals. []  See Plan of Care 
[]  See progress note/recertification 
[]  See Discharge Summary Progress towards goals / Updated goals: 
Short Term Goals: To be accomplished in 3 weeks: 
1) Pt will be independent with HEP. MET 
2) Pt will be able to sit with upright posture >/= 5 minutes without increase of symptoms. MET 
3) Pt will report improvement in cervical Rotation to look over shoulders while driving. MET 
  
Long Term Goals: To be accomplished in 6 weeks: 
1) Pt will be able to read without increase of neck pain. 2) Pt will be able to look over shoulders while driving without increase of neck pain. 3) Pt will demonstrate ability to lift >/= 20 lbs without increase of symptoms. 4) Pt will be able to sleep through the night without waking in pain. 5) Pt will be able to rise from supine to sitting without head lag or pain. 
  
PLAN [x]  Upgrade activities as tolerated     [x]  Continue plan of care 
[]  Update interventions per flow sheet      
[]  Discharge due to:_ 
[]  Other:_   
 
Roseline Tinsley, PT, DPT  1/31/2019

## 2019-02-05 ENCOUNTER — HOSPITAL ENCOUNTER (OUTPATIENT)
Dept: PHYSICAL THERAPY | Age: 64
Discharge: HOME | End: 2019-02-05
Payer: COMMERCIAL

## 2019-02-05 PROCEDURE — 97110 THERAPEUTIC EXERCISES: CPT

## 2019-02-05 PROCEDURE — 97140 MANUAL THERAPY 1/> REGIONS: CPT

## 2019-02-05 NOTE — PROGRESS NOTES
PT DAILY TREATMENT NOTE - [facility] 2-15 Patient Name: William Deal Date:2019 : 1955 [x]  Patient  Verified Payor: BLUE CROSS / Plan: [facility] PPO / Product Type: PPO / In time: 8:00am  Out time: 8:57am 
Total Treatment Time (min): 57 Total Timed Codes (min): 57 
1:1 Treatment Time (min): 57 Visit #: 4 Treatment Area: Back pain [M54.9] SUBJECTIVE Pain Level (0-10 scale): 0/10 Any medication changes, allergies to medications, adverse drug reactions, diagnosis change, or new procedure performed?: [x] No    [] Yes (see summary sheet for update) Subjective functional status/changes:   [] No changes reported Pt continues to use the electric heating pad daily. Pt states that pain gets worse as the day goes on. OBJECTIVE 45 min Therapeutic Exercise:  [x] See flow sheet :  
Rationale: increase ROM, increase strength and improve coordination to improve the patients ability to perform functional activities with increased ease 12 min Manual Therapy: STM bilateral UTs and cervical paraspinals; PROM cervical side-bending left/right and rotation left/right; gentle cervical traction; suboccipital inhibition Rationale: decrease pain, increase ROM, increase tissue extensibility, decrease trigger points and increase postural awareness to improve the patients ability to perform functional activities with increased ease With 
 [] TE 
 [] TA 
 [] neuro 
 [] other: Patient Education: [x] Review HEP-  
[] Progressed/Changed HEP based on:  
[] positioning   [] body mechanics   [] transfers   [] heat/ice application   
[] other:   
 
Other Objective/Functional Measures: --  
 
Pain Level (0-10 scale) post treatment: 0/10 ASSESSMENT/Changes in Function:  
Pt required verbal and tactile cues to avoid UT compensation during exercises.   Patient will continue to benefit from skilled PT services to modify and progress therapeutic interventions, address functional mobility deficits, address ROM deficits, address strength deficits, analyze and address soft tissue restrictions, analyze and cue movement patterns, analyze and modify body mechanics/ergonomics and assess and modify postural abnormalities to attain remaining goals. []  See Plan of Care 
[]  See progress note/recertification 
[]  See Discharge Summary Progress towards goals / Updated goals: 
Short Term Goals: To be accomplished in 3 weeks: 
1) Pt will be independent with HEP. MET 
2) Pt will be able to sit with upright posture >/= 5 minutes without increase of symptoms. MET 
3) Pt will report improvement in cervical Rotation to look over shoulders while driving. MET 
  
Long Term Goals: To be accomplished in 6 weeks: 
1) Pt will be able to read without increase of neck pain. 2) Pt will be able to look over shoulders while driving without increase of neck pain. 3) Pt will demonstrate ability to lift >/= 20 lbs without increase of symptoms. 4) Pt will be able to sleep through the night without waking in pain. 5) Pt will be able to rise from supine to sitting without head lag or pain. 
  
PLAN [x]  Upgrade activities as tolerated     [x]  Continue plan of care 
[]  Update interventions per flow sheet      
[]  Discharge due to:_ 
[]  Other:_   
 
Jayne Mariscal, PT, DPT  2/5/2019

## 2019-02-07 ENCOUNTER — HOSPITAL ENCOUNTER (OUTPATIENT)
Dept: PHYSICAL THERAPY | Age: 64
Discharge: HOME | End: 2019-02-07
Payer: COMMERCIAL

## 2019-02-07 PROCEDURE — 97110 THERAPEUTIC EXERCISES: CPT

## 2019-02-07 PROCEDURE — 97140 MANUAL THERAPY 1/> REGIONS: CPT

## 2019-02-07 NOTE — PROGRESS NOTES
PT DAILY TREATMENT NOTE - [facility] 2-15 Patient Name: Rakesh Perez Date:2019 : 1955 [x]  Patient  Verified Payor: BLUE CROSS / Plan: [facility] PPO / Product Type: PPO / In time: 1:50pmOut time: 3:00pm 
Total Treatment Time (min): 70 Total Timed Codes (min): 60 
1:1 Treatment Time (min): 30 Visit #: 8 Treatment Area: Back pain [M54.9] SUBJECTIVE Pain Level (0-10 scale): 0/10 Any medication changes, allergies to medications, adverse drug reactions, diagnosis change, or new procedure performed?: [x] No    [] Yes (see summary sheet for update) Subjective functional status/changes:   [] No changes reported Pt reports continued improvement. OBJECTIVE 45 min Therapeutic Exercise:  [x] See flow sheet :  
Rationale: increase ROM, increase strength and improve coordination to improve the patients ability to perform functional activities with increased ease 15 min Manual Therapy: STM bilateral UTs and cervical paraspinals; PROM cervical side-bending left/right and rotation left/right; gentle cervical traction; suboccipital inhibition Rationale: decrease pain, increase ROM, increase tissue extensibility, decrease trigger points and increase postural awareness to improve the patients ability to perform functional activities with increased ease With 
 [] TE 
 [] TA 
 [] neuro 
 [] other: Patient Education: [x] Review HEP-  
[] Progressed/Changed HEP based on:  
[] positioning   [] body mechanics   [] transfers   [] heat/ice application   
[] other:   
 
Other Objective/Functional Measures: --  
 
Pain Level (0-10 scale) post treatment: 0/10 ASSESSMENT/Changes in Function:  
Pt demonstrates improvement in performance of therapeutic exercises, without increase in pain.   Patient will continue to benefit from skilled PT services to modify and progress therapeutic interventions, address functional mobility deficits, address ROM deficits, address strength deficits, analyze and address soft tissue restrictions, analyze and cue movement patterns, analyze and modify body mechanics/ergonomics and assess and modify postural abnormalities to attain remaining goals. []  See Plan of Care 
[]  See progress note/recertification 
[]  See Discharge Summary Progress towards goals / Updated goals: 
Short Term Goals: To be accomplished in 3 weeks: 
1) Pt will be independent with HEP. MET 
2) Pt will be able to sit with upright posture >/= 5 minutes without increase of symptoms. MET 
3) Pt will report improvement in cervical Rotation to look over shoulders while driving. MET 
  
Long Term Goals: To be accomplished in 6 weeks: 
1) Pt will be able to read without increase of neck pain. 2) Pt will be able to look over shoulders while driving without increase of neck pain. 3) Pt will demonstrate ability to lift >/= 20 lbs without increase of symptoms. 4) Pt will be able to sleep through the night without waking in pain. 5) Pt will be able to rise from supine to sitting without head lag or pain. 
  
PLAN [x]  Upgrade activities as tolerated     [x]  Continue plan of care 
[]  Update interventions per flow sheet      
[]  Discharge due to:_ 
[]  Other:_   
 
Cindy Uribe, PT, DPT  2/7/2019

## 2019-02-11 ENCOUNTER — HOSPITAL ENCOUNTER (OUTPATIENT)
Dept: PHYSICAL THERAPY | Age: 64
Discharge: HOME | End: 2019-02-11
Payer: COMMERCIAL

## 2019-02-11 ENCOUNTER — OFFICE VISIT (OUTPATIENT)
Dept: SURGERY | Age: 64
End: 2019-02-11

## 2019-02-11 ENCOUNTER — OFFICE VISIT (OUTPATIENT)
Dept: ENDOCRINOLOGY | Age: 64
End: 2019-02-11

## 2019-02-11 VITALS
TEMPERATURE: 98 F | HEIGHT: 64 IN | OXYGEN SATURATION: 98 % | SYSTOLIC BLOOD PRESSURE: 148 MMHG | WEIGHT: 155 LBS | RESPIRATION RATE: 16 BRPM | DIASTOLIC BLOOD PRESSURE: 84 MMHG | BODY MASS INDEX: 26.46 KG/M2 | HEART RATE: 76 BPM

## 2019-02-11 VITALS
HEIGHT: 64 IN | TEMPERATURE: 97.9 F | DIASTOLIC BLOOD PRESSURE: 84 MMHG | OXYGEN SATURATION: 100 % | SYSTOLIC BLOOD PRESSURE: 145 MMHG | BODY MASS INDEX: 26.92 KG/M2 | HEART RATE: 71 BPM | RESPIRATION RATE: 16 BRPM | WEIGHT: 157.7 LBS

## 2019-02-11 DIAGNOSIS — E55.9 VITAMIN D DEFICIENCY: ICD-10-CM

## 2019-02-11 DIAGNOSIS — Z86.39 H/O HYPERPARATHYROIDISM: Primary | ICD-10-CM

## 2019-02-11 DIAGNOSIS — M80.00XS OSTEOPOROSIS WITH CURRENT PATHOLOGICAL FRACTURE, UNSPECIFIED OSTEOPOROSIS TYPE, SEQUELA: ICD-10-CM

## 2019-02-11 DIAGNOSIS — K91.2 POSTOPERATIVE INTESTINAL MALABSORPTION: Primary | ICD-10-CM

## 2019-02-11 PROCEDURE — 97110 THERAPEUTIC EXERCISES: CPT

## 2019-02-11 PROCEDURE — 97140 MANUAL THERAPY 1/> REGIONS: CPT

## 2019-02-11 RX ORDER — CALCIUM CARBONATE 600 MG
600 TABLET ORAL DAILY
Qty: 90 TAB | Refills: 3 | Status: SHIPPED | OUTPATIENT
Start: 2019-02-11 | End: 2020-05-05

## 2019-02-11 RX ORDER — MELATONIN
1000 DAILY
Qty: 90 TAB | Refills: 3 | Status: SHIPPED | OUTPATIENT
Start: 2019-02-11 | End: 2020-05-05

## 2019-02-11 RX ORDER — ALENDRONATE SODIUM 70 MG/1
70 TABLET ORAL
Qty: 12 TAB | Refills: 3
Start: 2019-02-11 | End: 2020-05-05

## 2019-02-11 NOTE — PROGRESS NOTES
PT DAILY TREATMENT NOTE - [facility] 2-15 Patient Name: Doc Hansen Date:2019 : 1955 [x]  Patient  Verified Payor: BLUE CROSS / Plan: [facility] PPO / Product Type: PPO / In time: 12:30pm Out time: 1:32pm 
Total Treatment Time (min): 62 Total Timed Codes (min): 62 
1:1 Treatment Time (min): 62 Visit #: 6 Treatment Area: Back pain [M54.9] SUBJECTIVE Pain Level (0-10 scale): 0/10 Any medication changes, allergies to medications, adverse drug reactions, diagnosis change, or new procedure performed?: [x] No    [] Yes (see summary sheet for update) Subjective functional status/changes:   [] No changes reported Pt had a good weekend. Pt took Advil and noticed significant improvement; pt was able to sleep through the night. OBJECTIVE 47 min Therapeutic Exercise:  [x] See flow sheet :  
Rationale: increase ROM, increase strength and improve coordination to improve the patients ability to perform functional activities with increased ease 15 min Manual Therapy: STM bilateral UTs and cervical paraspinals; PROM cervical side-bending left/right and rotation left/right; gentle cervical traction; suboccipital inhibition Rationale: decrease pain, increase ROM, increase tissue extensibility, decrease trigger points and increase postural awareness to improve the patients ability to perform functional activities with increased ease With 
 [] TE 
 [] TA 
 [] neuro 
 [] other: Patient Education: [x] Review HEP-  
[] Progressed/Changed HEP based on:  
[] positioning   [] body mechanics   [] transfers   [] heat/ice application   
[] other:   
 
Other Objective/Functional Measures: --  
 
Pain Level (0-10 scale) post treatment: 0/10 ASSESSMENT/Changes in Function: At rest, pt hold head in right lateral tilt. Pt educated on postural awareness and correction.   Patient will continue to benefit from skilled PT services to modify and progress therapeutic interventions, address functional mobility deficits, address ROM deficits, address strength deficits, analyze and address soft tissue restrictions, analyze and cue movement patterns, analyze and modify body mechanics/ergonomics and assess and modify postural abnormalities to attain remaining goals. []  See Plan of Care 
[]  See progress note/recertification 
[]  See Discharge Summary Progress towards goals / Updated goals: 
Short Term Goals: To be accomplished in 3 weeks: 
1) Pt will be independent with HEP. MET 
2) Pt will be able to sit with upright posture >/= 5 minutes without increase of symptoms. MET 
3) Pt will report improvement in cervical Rotation to look over shoulders while driving. MET 
  
Long Term Goals: To be accomplished in 6 weeks: 
1) Pt will be able to read without increase of neck pain. 2) Pt will be able to look over shoulders while driving without increase of neck pain. MET 
3) Pt will demonstrate ability to lift >/= 20 lbs without increase of symptoms. 4) Pt will be able to sleep through the night without waking in pain. MET 
5) Pt will be able to rise from supine to sitting without head lag or pain. 
  
PLAN [x]  Upgrade activities as tolerated     [x]  Continue plan of care 
[]  Update interventions per flow sheet      
[]  Discharge due to:_ 
[]  Other:_   
 
Alvino Roldan, PT, DPT  2/11/2019
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
## 2019-02-11 NOTE — PROGRESS NOTES
1. Have you been to the ER, urgent care clinic since your last visit? Hospitalized since your last visit? No    2. Have you seen or consulted any other health care providers outside of the [address] [place] [address] since your last visit? Include any pap smears or colon screening.  No
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
## 2019-02-14 ENCOUNTER — HOSPITAL ENCOUNTER (OUTPATIENT)
Dept: PHYSICAL THERAPY | Age: 64
Discharge: HOME | End: 2019-02-14
Payer: COMMERCIAL

## 2019-02-14 PROCEDURE — 97110 THERAPEUTIC EXERCISES: CPT

## 2019-02-14 PROCEDURE — 97140 MANUAL THERAPY 1/> REGIONS: CPT

## 2019-02-18 ENCOUNTER — HOSPITAL ENCOUNTER (OUTPATIENT)
Dept: PHYSICAL THERAPY | Age: 64
Discharge: HOME | End: 2019-02-18
Payer: COMMERCIAL

## 2019-02-18 PROCEDURE — 97140 MANUAL THERAPY 1/> REGIONS: CPT

## 2019-02-18 PROCEDURE — 97110 THERAPEUTIC EXERCISES: CPT

## 2019-02-18 NOTE — PROGRESS NOTES
PT DAILY TREATMENT NOTE - [facility] 2-15 Patient Name: Ashley Baeza Date:2019 : 1955 [x]  Patient  Verified Payor: BLUE CROSS / Plan: [facility] PPO / Product Type: PPO / In time: 2:53pm Out time: 3:49pm 
Total Treatment Time (min): 56 Total Timed Codes (min): 56 
1:1 Treatment Time (min): 40 Visit #: 9 Treatment Area: Back pain [M54.9] SUBJECTIVE Pain Level (0-10 scale): 2/10 Any medication changes, allergies to medications, adverse drug reactions, diagnosis change, or new procedure performed?: [x] No    [] Yes (see summary sheet for update) Subjective functional status/changes:   [] No changes reported Pt complains of \"discomfort\" in neck. Pt continues to complains of difficulty sleeping, due to waking throughout the night in pain. OBJECTIVE 40 min Therapeutic Exercise:  [x] See flow sheet :  
Rationale: increase ROM, increase strength and improve coordination to improve the patients ability to perform functional activities with increased ease 16 min Manual Therapy: STM bilateral UTs and cervical paraspinals; PROM cervical side-bending left/right and rotation left/right; gentle cervical traction; suboccipital inhibition Rationale: decrease pain, increase ROM, increase tissue extensibility, decrease trigger points and increase postural awareness to improve the patients ability to perform functional activities with increased ease With 
 [] TE 
 [] TA 
 [] neuro 
 [] other: Patient Education: [x] Review HEP-  
[] Progressed/Changed HEP based on:  
[] positioning   [] body mechanics   [] transfers   [] heat/ice application   
[] other:   
 
Other Objective/Functional Measures: --  
 
Pain Level (0-10 scale) post treatment: 0/10 ASSESSMENT/Changes in Function:  
Pt challenged with scapular strengthening exercises today; pt fatigues quickly and requires constant supervision for proper form.   Pt will benefit from continued skilled outpatient PT to modify and progress therapeutic interventions, address functional mobility deficits, address ROM deficits, address strength deficits, analyze and address soft tissue restrictions, analyze and cue movement patterns, analyze and modify body mechanics/ergonomics and assess and modify postural abnormalities to attain remaining goals. []  See Plan of Care 
[]  See progress note/recertification 
[]  See Discharge Summary Progress towards goals / Updated goals: 
Short Term Goals: To be accomplished in 3 weeks: 
1) Pt will be independent with HEP. MET 
2) Pt will be able to sit with upright posture >/= 5 minutes without increase of symptoms. MET 
3) Pt will report improvement in cervical Rotation to look over shoulders while driving. MET 
  
Long Term Goals: To be accomplished in 6 weeks: 
1) Pt will be able to read without increase of neck pain. 2) Pt will be able to look over shoulders while driving without increase of neck pain. MET 
3) Pt will demonstrate ability to lift >/= 20 lbs without increase of symptoms. 4) Pt will be able to sleep through the night without waking in pain. MET 
5) Pt will be able to rise from supine to sitting without head lag or pain. 
  
PLAN [x]  Upgrade activities as tolerated     [x]  Continue plan of care 
[]  Update interventions per flow sheet      
[]  Discharge due to:_ 
[]  Other:_   
 
Jayne Mariscal, PT, DPT  2/18/2019

## 2019-02-27 ENCOUNTER — HOSPITAL ENCOUNTER (OUTPATIENT)
Dept: PHYSICAL THERAPY | Age: 64
Discharge: HOME | End: 2019-02-27
Payer: COMMERCIAL

## 2019-02-27 PROCEDURE — 97140 MANUAL THERAPY 1/> REGIONS: CPT

## 2019-02-27 PROCEDURE — 97110 THERAPEUTIC EXERCISES: CPT

## 2019-02-27 NOTE — PROGRESS NOTES
PT DAILY TREATMENT NOTE - [facility] 2-15 Patient Name: Clint Larson Date:2019 : 1955 [x]  Patient  Verified Payor: BLUE CROSS / Plan: [facility] PPO / Product Type: PPO / In time: 2:00pm Out time: 3:00pm 
Total Treatment Time (min): 60 Total Timed Codes (min): 60 
1:1 Treatment Time (min): 60 Visit #: 51 Treatment Area: Back pain [M54.9] SUBJECTIVE Pain Level (0-10 scale): 2/10 Any medication changes, allergies to medications, adverse drug reactions, diagnosis change, or new procedure performed?: [x] No    [] Yes (see summary sheet for update) Subjective functional status/changes:   [] No changes reported Pt is pleased with progress made in PT. Pt states that she is 75% better and has \"more good days than bad days\". OBJECTIVE 45 min Therapeutic Exercise:  [x] See flow sheet :  
Rationale: increase ROM, increase strength and improve coordination to improve the patients ability to perform functional activities with increased ease 15 min Manual Therapy: PROM cervical side-bending left/right and rotation left/right; gentle cervical traction; right scapular mobilization- inferior glide and distraction grade II and III Rationale: decrease pain, increase ROM, increase tissue extensibility, decrease trigger points and increase postural awareness to improve the patients ability to perform functional activities with increased ease With 
 [] TE 
 [] TA 
 [] neuro 
 [] other: Patient Education: [x] Review HEP-  
[] Progressed/Changed HEP based on:  
[] positioning   [] body mechanics   [] transfers   [] heat/ice application   
[] other:   
 
Other Objective/Functional Measures: --  
 
Pain Level (0-10 scale) post treatment: 0/10 ASSESSMENT/Changes in Function:   
[]  See Plan of Care 
[]  See progress note/recertification 
[x]  See Discharge Summary Progress towards goals / Updated goals: 
Short Term Goals: To be accomplished in 3 weeks: 1) Pt will be independent with HEP. MET 
2) Pt will be able to sit with upright posture >/= 5 minutes without increase of symptoms. MET 
3) Pt will report improvement in cervical Rotation to look over shoulders while driving. MET 
  
Long Term Goals: To be accomplished in 6 weeks: 
1) Pt will be able to read without increase of neck pain. MET 
2) Pt will be able to look over shoulders while driving without increase of neck pain. MET 
3) Pt will demonstrate ability to lift >/= 20 lbs without increase of symptoms. MET 
4) Pt will be able to sleep through the night without waking in pain. MET 
5) Pt will be able to rise from supine to sitting without head lag or pain. MET 
  
PLAN 
[]  Upgrade activities as tolerated     []  Continue plan of care 
[]  Update interventions per flow sheet [x]  Discharge due to:_ goals met 
[]  Other:_   
 
Edilia Guillermo, PT, DPT  2/27/2019
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
## 2019-03-06 ENCOUNTER — OFFICE VISIT (OUTPATIENT)
Dept: INTERNAL MEDICINE CLINIC | Age: 64
End: 2019-03-06

## 2019-03-06 VITALS
DIASTOLIC BLOOD PRESSURE: 88 MMHG | HEART RATE: 68 BPM | OXYGEN SATURATION: 97 % | RESPIRATION RATE: 18 BRPM | HEIGHT: 62 IN | BODY MASS INDEX: 29.15 KG/M2 | WEIGHT: 158.38 LBS | TEMPERATURE: 97.8 F | SYSTOLIC BLOOD PRESSURE: 118 MMHG
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
## 2019-03-06 DIAGNOSIS — S13.4XXA WHIPLASH INJURY TO NECK, INITIAL ENCOUNTER: ICD-10-CM

## 2019-03-06 DIAGNOSIS — R07.81 PLEURITIC CHEST PAIN: Primary | ICD-10-CM

## 2019-03-06 DIAGNOSIS — F51.01 PRIMARY INSOMNIA: ICD-10-CM

## 2019-03-06 RX ORDER — DIPHENHYDRAMINE HCL 25 MG
25 CAPSULE ORAL
Qty: 30 CAP | Refills: 11
Start: 2019-03-06

## 2019-03-06 NOTE — PATIENT INSTRUCTIONS
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
## 2019-05-06 ENCOUNTER — OFFICE VISIT (OUTPATIENT)
Dept: SURGERY | Age: 64
End: 2019-05-06

## 2019-05-06 VITALS
OXYGEN SATURATION: 96 % | HEART RATE: 69 BPM | BODY MASS INDEX: 28.34 KG/M2 | HEIGHT: 62 IN | DIASTOLIC BLOOD PRESSURE: 84 MMHG | TEMPERATURE: 97.9 F | RESPIRATION RATE: 16 BRPM | SYSTOLIC BLOOD PRESSURE: 110 MMHG | WEIGHT: 154 LBS

## 2019-05-06 DIAGNOSIS — K91.2 POSTOPERATIVE INTESTINAL MALABSORPTION: Primary | ICD-10-CM

## 2019-05-06 NOTE — PROGRESS NOTES
1. Have you been to the ER, urgent care clinic since your last visit? Hospitalized since your last visit? No    2. Have you seen or consulted any other health care providers outside of the [address] [place] [address] since your last visit? Include any pap smears or colon screening. No    Zollie Certain  Body composition    female  61 y.o. Vitals:    05/06/19 0819   BP: 110/84   Pulse: 69   Resp: 16   Temp: 97.9 °F (36.6 °C)   TempSrc: Oral   SpO2: 96%   Weight: 154 lb (69.9 kg)   Height: 5' 2\" (1.575 m)     Body mass index is 28.17 kg/m².     H&P weight on 5/1/18 was 181 pounds  Last office visit weight on 2/11/19 was 155 pounds

## 2019-05-06 NOTE — PROGRESS NOTES
Chief Complaint   Patient presents with    Surgical Follow-up     1 year S/P Laparoscopic resection and reconstruction of gastrojejunostomy (5/8/18)-Down 27 pounds - lost 1 pound       Niesha Person is 1 year status post revision of G-J for treatment of severe GERD. Presents today for follow up . Patient has lost 27 lbs. Since surgery. Weight is stable and she feels well. Patient is satisfied with progress. Patient is consuming 45+ grams of protein daily. Patient is drinking 48 oz of fluids per day. Bowels moving daily. Nausea  prn  Regurgitation  Rare (\"only if I do something I shouldn't\")  Vitamin compliance yes  Activity  walking  Sleep   Poor, but always has been       Co-Morbid(s)         Was anti coagulation initiated for presumed / confirmed DVT/PE? NO    Was an incisional hernia noted on exam?       NO      COMORBIDITY     SLEEP APNEA                 NO        GERD  (req.meds)           NO  HYPERLIPIDEMIA           NO  HYPERTENSION            NO     IF YES, # OF HTN MEDICATIONS 0  DIABETES                        YES      IF NO 0 NON-INSULIN   0 INSULIN     Physical Exam  Visit Vitals  /84 (BP 1 Location: Left arm, BP Patient Position: Sitting)   Pulse 69   Temp 97.9 °F (36.6 °C) (Oral)   Resp 16   Ht 5' 2\" (1.575 m)   Wt 154 lb (69.9 kg)   SpO2 96%   BMI 28.17 kg/m²     A + O x 3  Chest  CTA, unlabored   COR  RRR  ABD Soft,  well healed; NT/ND, no masses or hernias   EXT No edema; ambulating independently       ICD-10-CM ICD-9-CM    1. 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 BMI 28.0-28.9,adult Z68.28 V85.24        Niesha Person is 1 year s/p revision G-J  doing well   Diet bariatric and RD available   Continue vitamins and protein supplements   Activity daily walking 10 minutes every hour   Sleep hygiene reviewed   Follow-up in 6 months   Support group  Niesha Person verbalized understanding and questions were answered to the best of my knowledge and ability. Diet, activity and mindfulness educational materials were provided. 17 minutes spent in face to face with  Gomez > 50% counseling.

## 2020-03-09 ENCOUNTER — TELEPHONE (OUTPATIENT)
Dept: SURGERY | Age: 65
End: 2020-03-09

## 2020-04-07 ENCOUNTER — TELEPHONE (OUTPATIENT)
Dept: INTERNAL MEDICINE CLINIC | Age: 65
End: 2020-04-07

## 2020-04-07 NOTE — TELEPHONE ENCOUNTER
Advised pt due to COVID-19 pandemic [facility] [place] and Andreina Zhu has required us to call our pt's to cancel their office appt. This is to insure to protect you the pt. We would like to scheduled you for Telemedicine visit through My Chart. Pt declined. Does not have computer and only uses phone to call people. Not tech savvy. Requested to be rescheduled.  Scheduled for 6/25/20 at 3:50 pm.

## 2020-05-04 ENCOUNTER — TELEPHONE (OUTPATIENT)
Dept: INTERNAL MEDICINE CLINIC | Age: 65
End: 2020-05-04

## 2020-05-04 NOTE — TELEPHONE ENCOUNTER
Spoke with pt - advised her MD recommends she schedule a telemed visit. Scheduled tomorrow 5/5/20 2 pm for doxy me. Pt was having issues with My Chart.

## 2020-05-04 NOTE — TELEPHONE ENCOUNTER
Verified patient identity with two identifiers. Spoke with patient by phone. She does not know the co-worker that tested + as they will not release that information. This was last Wednesday and she was sent home last Friday bc this person was +. She now is being told if she wasn't in direct contact for > 10 minutes than she can return to work, yet she does not know who the person is. Pt believes they want her to come back to work as they are short staffed ([employer]). She wants to use the PEL leave but needs her doctor to approve. Will route to [facility].

## 2020-05-04 NOTE — TELEPHONE ENCOUNTER
Pt called said that she works with someone at her job that tested positive for the [place] .  Pt herself has not symptoms wants to know what she should do  Best contact number is [phone]

## 2020-05-05 ENCOUNTER — VIRTUAL VISIT (OUTPATIENT)
Dept: INTERNAL MEDICINE CLINIC | Age: 65
End: 2020-05-05

## 2020-05-05 DIAGNOSIS — Z20.822 CLOSE EXPOSURE TO COVID-19 VIRUS: Primary | ICD-10-CM

## 2020-05-05 NOTE — LETTER
5/5/2020 2:14 PM 
 
Ms. Joe Mcleod [address] [facility] [address] 
 
 
5/5/2020 To Whom It May Concern: 
 
I am the Primary Care Provider for Joe Mcleod. This patient has chronic medical condition that would be of high risk for complications if exposed or contracts COVID-19. I am recommending this patient to work from home or in an area that would eliminate the exposure to this virus. Further she reports potential exposure at work and should be quarantined for 14 days from the date of last possible exposure. I appreciate your assistance in this manner. Please let me know if you have any questions. Sincerely, Tierney Crisostomo Sincerely, Davian Linn MD

## 2020-05-05 NOTE — PROGRESS NOTES
HISTORY OF PRESENT ILLNESS  Rey Amaya is a 59 y.o. female. This is a real-time audio/video visit brought to you by our sponsors, the fine folks at [facility] [place] and [employer]. [employer]. Me platform   HPI \"Khushbu\" is seen today for evaluation of possible COVID-19 exposure. She works at the [employer]. She was working at [employer] in [facility]. Apparently, a coworker tested positive for COVID. She feels very certain she did have exposure to the individual, although apparently due to confidentiality reasons, the name of the individual is not released to coworkers. There had been some other potential secondhand exposure. She denies any symptoms of illness. She has no fevers, chills, malaise, or shortness of breath. I reviewed with her, given her age (just a few weeks shy of age 72), I feel she would qualify as a high-risk individual and should be placed in a low-exposure area. Further, she should undergo further strict quarantine for 14 days from her last exposure to one of the possibly affected coworkers. A letter is written to this effect. Social history is notable for plans to retire as the 05/22/2020. For preventive medicine, she will return in 06/2020 for her complete physical exam, calling sooner p.r.n. if any problems. Review of Systems   Constitutional: Negative for chills and fever. Respiratory: Negative for cough, shortness of breath and wheezing. Physical Exam  Vitals signs and nursing note reviewed. Constitutional:       General: She is not in acute distress. Appearance: She is not ill-appearing. Skin:     General: Skin is warm and dry. Neurological:      Mental Status: She is alert. Psychiatric:         Behavior: Behavior normal.         ASSESSMENT and PLAN  Diagnoses and all orders for this visit:    1.  Close Exposure to Covid-19 Virus  - See letter

## 2020-05-10 LAB — SARS-COV-2, NAA: NOT DETECTED

## 2020-06-08 ENCOUNTER — HOSPITAL ENCOUNTER (OUTPATIENT)
Dept: MAMMOGRAPHY | Age: 65
Discharge: HOME | End: 2020-06-08
Attending: INTERNAL MEDICINE
Payer: MEDICARE

## 2020-06-08 DIAGNOSIS — Z12.31 VISIT FOR SCREENING MAMMOGRAM: ICD-10-CM

## 2020-06-08 PROCEDURE — 77067 SCR MAMMO BI INCL CAD: CPT

## 2020-06-25 ENCOUNTER — OFFICE VISIT (OUTPATIENT)
Dept: INTERNAL MEDICINE CLINIC | Age: 65
End: 2020-06-25

## 2020-06-25 VITALS
RESPIRATION RATE: 20 BRPM | HEIGHT: 62 IN | HEART RATE: 85 BPM | TEMPERATURE: 97.6 F | OXYGEN SATURATION: 97 % | WEIGHT: 156.8 LBS | DIASTOLIC BLOOD PRESSURE: 84 MMHG | BODY MASS INDEX: 28.85 KG/M2 | SYSTOLIC BLOOD PRESSURE: 154 MMHG

## 2020-06-25 DIAGNOSIS — E78.00 ELEVATED LDL CHOLESTEROL LEVEL: ICD-10-CM

## 2020-06-25 DIAGNOSIS — Z00.00 WELCOME TO MEDICARE PREVENTIVE VISIT: Primary | ICD-10-CM

## 2020-06-25 DIAGNOSIS — N13.30 HYDRONEPHROSIS OF RIGHT KIDNEY: ICD-10-CM

## 2020-06-25 DIAGNOSIS — Z23 ENCOUNTER FOR IMMUNIZATION: ICD-10-CM

## 2020-06-25 DIAGNOSIS — F51.01 PRIMARY INSOMNIA: ICD-10-CM

## 2020-06-25 NOTE — PROGRESS NOTES
This is a \"Welcome to [employer]"  Initial Preventive Physical Examination (IPPE) providing Personalized Prevention Plan Services (Performed in the first 12 months of enrollment)    I have reviewed the patient's medical history in detail and updated the computerized patient record. History     Past Medical History:   Diagnosis Date    Arthritis     OSTEO    Broken toe 2017    right #5    Chronic pain     LT. ARM    GERD (gastroesophageal reflux disease)     History of blood transfusion 1995    [place], NO REACTION; needed transfusion from surgery-ovarian cyst and hit artery per pt.  Morbid obesity ([address] 3/23/2011    PUD (peptic ulcer disease)       Past Surgical History:   Procedure Laterality Date    BREAST SURGERY PROCEDURE UNLISTED Left     LT. TUMOR BENIGN REMOVED AGE 18    COLONOSCOPY Left 3/20/2018    COLONOSCOPY performed by Andrea Kim MD at [address] [place] [address] UNLISTED      HX ABDOMINOPLASTY  2006    HX ADENOIDECTOMY      HX CARPAL TUNNEL RELEASE  2001    RIGHT    HX CHOLECYSTECTOMY  2000    HX CHOLECYSTECTOMY  2000    at time of gastric bypass     HX COLONOSCOPY      2014, due 17 vs 19    HX GASTRIC BYPASS  12-13-00    dr. Bianchi Preston HX GI  2016    endoscopy, colonoscopy    HX HEENT  1995    TUMOR REMOVED neck (benign)    HX HEENT  05/2017    PARATHYROID EXCISION    HX HYSTERECTOMY  1993    HX ORTHOPAEDIC      CYCST REMOVED BASE OF SPINE AGE 23    HX ORTHOPAEDIC Right 2004,2009    RIGHT ANKLE FX    HX ORTHOPAEDIC Left 4/2012    LT.  SHOULDER AND HUMERUS FX, SCREW AND PLATE ( has been removed)    HX ORTHOPAEDIC Right 2014    shoulder fx, orif    HX ORTHOPAEDIC Right     CARPAL TUNNEL    HX OTHER SURGICAL      TUMOR REMOVED RIGHT UPPER FLANK AREA    HX OTHER SURGICAL  05/08/2018    resection of gastric pouch by Dr Gerardo Huff [facility]  LEG/ANKLE SURGERY PROC UNLISTED      right ankle cartilage    LEG/ANKLE SURGERY PROC UNLISTED  2009     ankle    UT BIOPSY/EXCISION, LYMPH NODE(S)       Current Outpatient Medications   Medication Sig Dispense Refill    diphenhydrAMINE (BENADRYL) 25 mg capsule Take 1 Cap by mouth nightly as needed for Sleep. 30 Cap 11     Allergies   Allergen Reactions    Codeine Hives     Tolerates Dilaudid       Family History   Problem Relation Age of Onset    Other Mother         hypoglycemia, obese,[place] corey syndrome    Cancer Mother         LUNG    Lung Disease Mother     Obesity Mother     Cancer Father         GENERALIZED    Other Father         Geena Ormond corey syndrome gene trait    Asthma Sister     Lung Disease Sister     Hypertension Brother     Lung Disease Brother     Diabetes Brother     Thyroid Disease Brother     Other Brother         Iza corey syndrome    Cancer Brother         COLON    Heart Disease Maternal Aunt     Cancer Maternal Aunt     Cancer Maternal Uncle         UNKNOWN    Cancer Paternal Uncle     Heart Disease Maternal Grandmother     Heart Disease Maternal Grandfather     Heart Disease Paternal Grandmother     Heart Disease Paternal Grandfather     Cancer Paternal Uncle     Lung Disease Brother     Cancer Maternal Uncle     Cancer Paternal Aunt         BREAST    Anesth Problems Neg Hx      Social History     Tobacco Use    Smoking status: Current Some Day Smoker     Packs/day: 0.50     Years: 7.00     Pack years: 3.50     Types: Cigarettes     Last attempt to quit: 3/18/2018     Years since quittin.2    Smokeless tobacco: Never Used   Substance Use Topics    Alcohol use:  Yes     Alcohol/week: 4.0 standard drinks     Types: 1 Glasses of wine, 3 Cans of beer per week     Comment: 6 PACK/every 2 weeks       Depression Risk Screen     3 most recent PHQ Screens 2020   Little interest or pleasure in doing things Not at all   Feeling down, depressed, irritable, or hopeless Not at all   Total Score PHQ 2 0        Alcohol Risk Factor Screening:   Do you average 1 drink per night or more than 7 drinks a week:  No- 4 to 5 per wk    On any one occasion in the past three months have you have had more than 3 drinks containing alcohol:  No      Functional Ability and Level of Safety   Diet: No special diet     Hearing: loss is mild     Vision Screening:  Vision is good. No exam data present     Activities of Daily Living: The home contains: no safety equipment. Patient does total self care     Ambulation: with no difficulty     Exercise level: sedentary     Fall Risk Screen:  Fall Risk Assessment, last 12 mths 6/25/2020   Able to walk? Yes   Fall in past 12 months? Yes   Fall with injury? Yes   Number of falls in past 12 months 1   Fall Risk Score 2     Abuse Screen:  Patient is not abused       Screening EKG   EKG order placed: no     Patient Care Team   Patient Care Team:  Jason Mckinney MD as PCP - General (Internal Medicine)  Jason Mckinney MD as PCP - [facility] Empaneled Provider  Arina Nicole MD as Consulting Provider (Endocrinology)  Dion Harrell MD as Surgeon (General Surgery)  Zahida Koenig MD as Surgeon (General Surgery)     End of Life Planning   Advanced care planning directives were not discussed with the patient and/or family/caregiver. Assessment/Plan   Education and counseling provided:  Are appropriate based on today's review and evaluation    Diagnoses and all orders for this visit:    1. Welcome to Medicare preventive visit    2.  Primary insomnia    Other orders  -     CBC WITH AUTOMATED DIFF  -     METABOLIC PANEL, COMPREHENSIVE  -     LIPID PANEL  -     T4, FREE  -     URINALYSIS W/ RFLX MICROSCOPIC        Health Maintenance Due   Topic Date Due    Shingrix Vaccine Age 50> (1 of 2) 06/15/2005    PAP AKA CERVICAL CYTOLOGY  04/01/2018    GLAUCOMA SCREENING Q2Y  06/15/2020    Pneumococcal 65+ years (1 of 1 - PPSV23) 06/15/2020    Medicare Yearly Exam  06/08/2020

## 2020-06-25 NOTE — PATIENT INSTRUCTIONS
Medicare Wellness Visit, Female The best way to live healthy is to have a lifestyle where you eat a well-balanced diet, exercise regularly, limit alcohol use, and quit all forms of tobacco/nicotine, if applicable. Regular preventive services are another way to keep healthy. Preventive services (vaccines, screening tests, monitoring & exams) can help personalize your care plan, which helps you manage your own care. Screening tests can find health problems at the earliest stages, when they are easiest to treat. [facility] follows the current, evidence-based guidelines published by the [facility] ([facility]) when recommending preventive services for our patients. Because we follow these guidelines, sometimes recommendations change over time as research supports it. (For example, mammograms used to be recommended annually. Even though Medicare will still pay for an annual mammogram, the newer guidelines recommend a mammogram every two years for women of average risk). Of course, you and your doctor may decide to screen more often for some diseases, based on your risk and your co-morbidities (chronic disease you are already diagnosed with). Preventive services for you include: - Medicare offers their members a free annual wellness visit, which is time for you and your primary care provider to discuss and plan for your preventive service needs. Take advantage of this benefit every year! 
-All adults over the age of 72 should receive the recommended pneumonia vaccines. Current USPSTF guidelines recommend a series of two vaccines for the best pneumonia protection.  
-All adults should have a flu vaccine yearly and a tetanus vaccine every 10 years.  
-All adults age 48 and older should receive the shingles vaccines (series of two vaccines). -All adults age 38-68 who are overweight should have a diabetes screening test once every three years. -All adults born between [birth year] and 1965 should be screened once for Hepatitis C. 
-Other screening tests and preventive services for persons with diabetes include: an eye exam to screen for diabetic retinopathy, a kidney function test, a foot exam, and stricter control over your cholesterol.  
-Cardiovascular screening for adults with routine risk involves an electrocardiogram (ECG) at intervals determined by your doctor.  
-Colorectal cancer screenings should be done for adults age 54-65 with no increased risk factors for colorectal cancer. There are a number of acceptable methods of screening for this type of cancer. Each test has its own benefits and drawbacks. Discuss with your doctor what is most appropriate for you during your annual wellness visit. The different tests include: colonoscopy (considered the best screening method), a fecal occult blood test, a fecal DNA test, and sigmoidoscopy. 
 
-A bone mass density test is recommended when a woman turns 65 to screen for osteoporosis. This test is only recommended one time, as a screening. Some providers will use this same test as a disease monitoring tool if you already have osteoporosis. -Breast cancer screenings are recommended every other year for women of normal risk, age 54-69. 
-Cervical cancer screenings for women over age 72 are only recommended with certain risk factors. Here is a list of your current Health Maintenance items (your personalized list of preventive services) with a due date: 
Health Maintenance Due Topic Date Due  Shingles Vaccine (1 of 2) 06/15/2005  Pap Test  04/01/2018  Glaucoma Screening   06/15/2020  Pneumococcal Vaccine (1 of 1 - PPSV23) 06/15/2020 [address] [place] [address] Annual Well Visit  06/08/2020

## 2020-07-08 ENCOUNTER — HOSPITAL ENCOUNTER (OUTPATIENT)
Dept: LAB | Age: 65
Discharge: HOME | End: 2020-07-08
Payer: MEDICARE

## 2020-07-08 PROCEDURE — 84443 ASSAY THYROID STIM HORMONE: CPT

## 2020-07-08 PROCEDURE — 81003 URINALYSIS AUTO W/O SCOPE: CPT

## 2020-07-08 PROCEDURE — 80053 COMPREHEN METABOLIC PANEL: CPT

## 2020-07-08 PROCEDURE — 85025 COMPLETE CBC W/AUTO DIFF WBC: CPT

## 2020-07-08 PROCEDURE — 80061 LIPID PANEL: CPT

## 2020-07-08 PROCEDURE — 36415 COLL VENOUS BLD VENIPUNCTURE: CPT

## 2020-07-09 LAB
ALBUMIN SERPL-MCNC: 4.1 G/DL (ref 3.8–4.8)
ALBUMIN/GLOB SERPL: 2 {RATIO} (ref 1.2–2.2)
ALP SERPL-CCNC: 44 IU/L (ref 39–117)
ALT SERPL-CCNC: 15 IU/L (ref 0–32)
APPEARANCE UR: CLEAR
AST SERPL-CCNC: 18 IU/L (ref 0–40)
BASOPHILS # BLD AUTO: 0.1 X10E3/UL (ref 0–0.2)
BASOPHILS NFR BLD AUTO: 1 %
BILIRUB SERPL-MCNC: 0.4 MG/DL (ref 0–1.2)
BILIRUB UR QL STRIP: NEGATIVE
BUN SERPL-MCNC: 8 MG/DL (ref 8–27)
BUN/CREAT SERPL: 16 (ref 12–28)
CALCIUM SERPL-MCNC: 9 MG/DL (ref 8.7–10.3)
CHLORIDE SERPL-SCNC: 100 MMOL/L (ref 96–106)
CHOLEST SERPL-MCNC: 184 MG/DL (ref 100–199)
CO2 SERPL-SCNC: 24 MMOL/L (ref 20–29)
COLOR UR: YELLOW
CREAT SERPL-MCNC: 0.51 MG/DL (ref 0.57–1)
EOSINOPHIL # BLD AUTO: 0.2 X10E3/UL (ref 0–0.4)
EOSINOPHIL NFR BLD AUTO: 2 %
ERYTHROCYTE [DISTWIDTH] IN BLOOD BY AUTOMATED COUNT: 14.2 % (ref 11.7–15.4)
GLOBULIN SER CALC-MCNC: 2.1 G/DL (ref 1.5–4.5)
GLUCOSE SERPL-MCNC: 92 MG/DL (ref 65–99)
GLUCOSE UR QL: NEGATIVE
HCT VFR BLD AUTO: 39.4 % (ref 34–46.6)
HDLC SERPL-MCNC: 88 MG/DL
HGB BLD-MCNC: 12.7 G/DL (ref 11.1–15.9)
HGB UR QL STRIP: NEGATIVE
IMM GRANULOCYTES # BLD AUTO: 0 X10E3/UL (ref 0–0.1)
IMM GRANULOCYTES NFR BLD AUTO: 0 %
KETONES UR QL STRIP: NEGATIVE
LDLC SERPL CALC-MCNC: 77 MG/DL (ref 0–99)
LEUKOCYTE ESTERASE UR QL STRIP: NEGATIVE
LYMPHOCYTES # BLD AUTO: 1.5 X10E3/UL (ref 0.7–3.1)
LYMPHOCYTES NFR BLD AUTO: 20 %
MCH RBC QN AUTO: 26.3 PG (ref 26.6–33)
MCHC RBC AUTO-ENTMCNC: 32.2 G/DL (ref 31.5–35.7)
MCV RBC AUTO: 82 FL (ref 79–97)
MICRO URNS: NORMAL
MONOCYTES # BLD AUTO: 0.6 X10E3/UL (ref 0.1–0.9)
MONOCYTES NFR BLD AUTO: 8 %
NEUTROPHILS # BLD AUTO: 5.2 X10E3/UL (ref 1.4–7)
NEUTROPHILS NFR BLD AUTO: 69 %
NITRITE UR QL STRIP: NEGATIVE
PH UR STRIP: 6 [PH] (ref 5–7.5)
PLATELET # BLD AUTO: 319 X10E3/UL (ref 150–450)
POTASSIUM SERPL-SCNC: 4.7 MMOL/L (ref 3.5–5.2)
PROT SERPL-MCNC: 6.2 G/DL (ref 6–8.5)
PROT UR QL STRIP: NEGATIVE
RBC # BLD AUTO: 4.83 X10E6/UL (ref 3.77–5.28)
SODIUM SERPL-SCNC: 139 MMOL/L (ref 134–144)
SP GR UR: 1.01 (ref 1–1.03)
TRIGL SERPL-MCNC: 97 MG/DL (ref 0–149)
TSH SERPL DL<=0.005 MIU/L-ACNC: 2.54 UIU/ML (ref 0.45–4.5)
UROBILINOGEN UR STRIP-MCNC: 0.2 MG/DL (ref 0.2–1)
VLDLC SERPL CALC-MCNC: 19 MG/DL (ref 5–40)
WBC # BLD AUTO: 7.5 X10E3/UL (ref 3.4–10.8)

## 2020-07-29 ENCOUNTER — TELEPHONE (OUTPATIENT)
Dept: INTERNAL MEDICINE CLINIC | Age: 65
End: 2020-07-29

## 2020-07-29 NOTE — TELEPHONE ENCOUNTER
Spoke with pt earlier today. States she notice small amt of blood in her urine Sunday and felt pressure when urinating. Monday had temp of 102 and felt like she was starting to get UTI. Took Advil. Tuesday temp was 100. Today is now back to normal. She is requesting MD to send in antibiotic. Advised her MD would want her to schedule an appt prior to prescribing antibiotic. Due to her fever would schedule her telemed visit. Dr Lion Jean Baptiste did not have any available appt so scheduled with Rosaline Pro NP tomorrow 7/30/20 at 3:30 pm. Pt wanted to see about coming in the morning to drop off urine to be checked prior to visit? Will forward to Rosaline Pro to see if she agrees.

## 2020-07-30 ENCOUNTER — HOSPITAL ENCOUNTER (OUTPATIENT)
Dept: LAB | Age: 65
Discharge: HOME | End: 2020-07-30
Payer: MEDICARE

## 2020-07-30 ENCOUNTER — OFFICE VISIT (OUTPATIENT)
Dept: INTERNAL MEDICINE CLINIC | Age: 65
End: 2020-07-30

## 2020-07-30 ENCOUNTER — TELEPHONE (OUTPATIENT)
Dept: INTERNAL MEDICINE CLINIC | Age: 65
End: 2020-07-30

## 2020-07-30 ENCOUNTER — VIRTUAL VISIT (OUTPATIENT)
Dept: INTERNAL MEDICINE CLINIC | Age: 65
End: 2020-07-30

## 2020-07-30 DIAGNOSIS — R30.0 DYSURIA: Primary | ICD-10-CM

## 2020-07-30 DIAGNOSIS — R82.998 LEUKOCYTES IN URINE: ICD-10-CM

## 2020-07-30 DIAGNOSIS — N30.90 BLADDER INFECTION: Primary | ICD-10-CM

## 2020-07-30 LAB
BILIRUB UR QL STRIP: NEGATIVE
GLUCOSE UR-MCNC: NEGATIVE MG/DL
KETONES P FAST UR STRIP-MCNC: NEGATIVE MG/DL
PH UR STRIP: 6.5 [PH] (ref 4.6–8)
PROT UR QL STRIP: NORMAL
SP GR UR STRIP: 1.01 (ref 1–1.03)
UA UROBILINOGEN AMB POC: NORMAL (ref 0.2–1)
URINALYSIS CLARITY POC: CLEAR
URINALYSIS COLOR POC: YELLOW
URINE BLOOD POC: NORMAL
URINE LEUKOCYTES POC: NORMAL
URINE NITRITES POC: NEGATIVE

## 2020-07-30 PROCEDURE — 87077 CULTURE AEROBIC IDENTIFY: CPT

## 2020-07-30 PROCEDURE — 87088 URINE BACTERIA CULTURE: CPT

## 2020-07-30 PROCEDURE — 87086 URINE CULTURE/COLONY COUNT: CPT

## 2020-07-30 PROCEDURE — 87186 SC STD MICRODIL/AGAR DIL: CPT

## 2020-07-30 RX ORDER — NITROFURANTOIN 25; 75 MG/1; MG/1
100 CAPSULE ORAL 2 TIMES DAILY
Qty: 14 CAP | Refills: 0 | Status: SHIPPED | OUTPATIENT
Start: 2020-07-30 | End: 2020-08-06

## 2020-07-30 NOTE — PROGRESS NOTES
Kris Coley is a 72 y.o. female evaluated via telephone on 7/30/2020. Assessment & Plan:   Macrobid BID for 7 days and urine culture    Subjective:     Since last visit: Yes No: N/A - phone visit. I communicated with the patient and/or health care decision maker today regarding: c/o dysuria with onset 4 days ago. Saw blood in urine, had low abd discomfort and urinary frequency. By following day, T was 102. She has since been taking regularly Advil and pushing hydration and has felt somewhat better. T when she came by today to give urine sample was 99. She has not had a lot of UTIs with last several years ago, but one of them was especially \"bad\" with a lot of blood in the urine. LAB: Urinalysis with 3+ leukocytes and trace blood    Plan: Urine is sent for culture   Macrobid for 7 days   Hydration    Urgent Care if sxs worsen  ___________________________    The following sections were reviewed and/or updated:  Patient Active Problem List   Diagnosis Code    Insomnia, unspecified G47.00    Pernicious anemia D51.0    Vitamin D deficiency E55.9    Reflux esophagitis K21.0    Gastric ulcer K25.9    Osteoporosis M81.0    Iron deficiency anemia, unspecified D50.9    Onychomycosis B35.1    Active advance directive on file Z78.9    Hypercalcemia E83.52    H/O hyperparathyroidism Z86.39    Broken toe S92.919A    B12 deficiency E53.8    Gastroesophageal reflux disease without esophagitis K21.9    Diverticulosis K57.90    Elevated TSH R79.89     Current Outpatient Medications   Medication Sig Dispense Refill    diphenhydrAMINE (BENADRYL) 25 mg capsule Take 1 Cap by mouth nightly as needed for Sleep. 30 Cap 11    nitrofurantoin, macrocrystal-monohydrate, (MACROBID) 100 mg capsule Take 1 Cap by mouth two (2) times a day for 7 days.  14 Cap 0     Allergies   Allergen Reactions    Codeine Hives     Tolerates Dilaudid         Consent:  Kris Coley, who was seen by synchronous (real-time) audio only technology, and/or her healthcare decision maker, is aware that this patient-initiated, Telehealth encounter on 7/30/2020 is a billable service. She is aware that she may receive a bill for any such additional services and has provided verbal consent to proceed: Yes. Patient identification was verified prior to start of the visit. A caregiver was present when appropriate. Due to this being a TeleHealth encounter (during [facility] emergency), evaluation of the following organ systems was limited: VS/Constitutional/EENT/Resp/CV/GI//MS/Neuro/Skin/Heme-Lymph-Imm. Pursuant to the emergency declaration under the [facility] waiver authority and the [employer] and [employer] Act, this Virtual Visit was conducted, with patient's (and/or legal guardian's) consent, to reduce the patient's risk of exposure to COVID-19 and provide necessary medical care. Services were provided through a synchronous discussion virtually to substitute for in-person clinic visit. I was at home. The patient was at home. I affirm this is a Patient Initiated Episode with an Established Patient who has not had a related appointment within my department in the past 7 days or scheduled within the next 24 hours.     Total Time: minutes: 5-10 minutes    Note: not billable if this call serves to triage the patient into an appointment for the relevant concern    Karen Gaytan NP

## 2020-07-30 NOTE — TELEPHONE ENCOUNTER
First UTI in several years. 4 days ago she saw blood in the urine and felt pressure. Next day, she spiked a fever to 102. She has been taking Advil and keeping hydrated. This AM, when she came in for Urinalysis, 99. She recently had a long auto trip to [place]. Urinalysis: 3+ leukocytes and trace blood    Plan: Urine culture   Hydration   Macrobid 100 mg BID with food for 7 days.

## 2020-08-01 LAB — BACTERIA UR CULT: ABNORMAL

## 2020-08-03 NOTE — PROGRESS NOTES
Patient came in and gave urine sample, then returned home. I called her later in the day, and initiated treatment for UTI. Urine was sent for culture.

## 2021-02-27 ENCOUNTER — V-VISIT (OUTPATIENT)
Dept: FAMILY MEDICINE | Age: 66
End: 2021-02-27

## 2021-02-27 DIAGNOSIS — J01.90 ACUTE BACTERIAL RHINOSINUSITIS: Primary | ICD-10-CM

## 2021-02-27 DIAGNOSIS — B96.89 ACUTE BACTERIAL RHINOSINUSITIS: Primary | ICD-10-CM

## 2021-02-27 PROBLEM — E13.9 DIABETES 1.5, MANAGED AS TYPE 2 (CMD): Status: ACTIVE | Noted: 2021-02-27

## 2021-02-27 PROBLEM — D80.1 HYPOGAMMAGLOBULINEMIA (CMD): Status: ACTIVE | Noted: 2021-02-27

## 2021-02-27 PROCEDURE — 99213 OFFICE O/P EST LOW 20 MIN: CPT | Performed by: NURSE PRACTITIONER

## 2021-02-27 RX ORDER — DOXYCYCLINE 100 MG/1
100 TABLET ORAL 2 TIMES DAILY
Qty: 20 TABLET | Refills: 0 | Status: SHIPPED | OUTPATIENT
Start: 2021-02-27 | End: 2021-03-09

## 2021-02-27 RX ORDER — GUAIFENESIN 600 MG/1
600 TABLET, EXTENDED RELEASE ORAL 2 TIMES DAILY
Qty: 20 TABLET | Refills: 0 | Status: SHIPPED | COMMUNITY
Start: 2021-02-27 | End: 2021-03-09

## 2021-02-27 RX ORDER — SIMETHICONE 20 MG/.3ML
EMULSION ORAL
COMMUNITY
End: 2022-01-25 | Stop reason: DRUGHIGH

## 2021-02-27 RX ORDER — DEXTROMETHORPHN/ACETAMINOPH/CP 10-325-2MG
1 TABLET ORAL EVERY 6 HOURS PRN
Refills: 0 | Status: SHIPPED | COMMUNITY
Start: 2021-02-27 | End: 2021-03-06

## 2021-02-27 RX ORDER — ESCITALOPRAM OXALATE 10 MG/1
TABLET ORAL
COMMUNITY

## 2021-02-27 RX ORDER — BUPROPION HYDROCHLORIDE 150 MG/1
TABLET, EXTENDED RELEASE ORAL
COMMUNITY
End: 2022-01-25 | Stop reason: DRUGHIGH

## 2021-02-27 RX ORDER — METFORMIN HYDROCHLORIDE 500 MG/1
TABLET, EXTENDED RELEASE ORAL
COMMUNITY
End: 2022-04-07 | Stop reason: SDUPTHER

## 2021-03-08 ENCOUNTER — TELEPHONE (OUTPATIENT)
Dept: SURGERY | Age: 66
End: 2021-03-08

## 2021-03-09 DIAGNOSIS — Z23 NEED FOR VACCINATION: ICD-10-CM

## 2021-04-22 ENCOUNTER — WALK IN (OUTPATIENT)
Dept: URGENT CARE | Age: 66
End: 2021-04-22

## 2021-04-22 VITALS
TEMPERATURE: 98.3 F | RESPIRATION RATE: 16 BRPM | HEIGHT: 62 IN | BODY MASS INDEX: 25.58 KG/M2 | HEART RATE: 78 BPM | SYSTOLIC BLOOD PRESSURE: 110 MMHG | OXYGEN SATURATION: 98 % | WEIGHT: 139 LBS | DIASTOLIC BLOOD PRESSURE: 60 MMHG

## 2021-04-22 DIAGNOSIS — J01.01 ACUTE RECURRENT MAXILLARY SINUSITIS: Primary | ICD-10-CM

## 2021-04-22 PROCEDURE — 99213 OFFICE O/P EST LOW 20 MIN: CPT | Performed by: NURSE PRACTITIONER

## 2021-04-22 RX ORDER — DOXYCYCLINE HYCLATE 100 MG/1
100 CAPSULE ORAL 2 TIMES DAILY
Qty: 20 CAPSULE | Refills: 0 | Status: SHIPPED | OUTPATIENT
Start: 2021-04-22 | End: 2021-05-02

## 2021-04-22 ASSESSMENT — ENCOUNTER SYMPTOMS
SINUS PRESSURE: 1
VISUAL CHANGE: 0
FEVER: 0
SORE THROAT: 1
NUMBNESS: 0
ABDOMINAL PAIN: 0
CHANGE IN BOWEL HABIT: 0
VERTIGO: 0
STRIDOR: 0
FATIGUE: 1
SWOLLEN GLANDS: 0
FACIAL SWELLING: 0
SINUS PAIN: 1
SHORTNESS OF BREATH: 0
CHILLS: 0
DIAPHORESIS: 0
COUGH: 1
NAUSEA: 0
EYE DISCHARGE: 0
VOMITING: 0
HEADACHES: 1
APPETITE CHANGE: 0
RHINORRHEA: 1
WHEEZING: 0
ACTIVITY CHANGE: 1
WEAKNESS: 0
ANOREXIA: 0
APNEA: 0

## 2021-06-05 NOTE — PROGRESS NOTES
PT DAILY TREATMENT NOTE - [facility] 2-15 Patient Name: Kenzie Newman Date:2019 : 1955 [x]  Patient  Verified Payor: BLUE CROSS / Plan: [facility] PPO / Product Type: PPO / In time: 8:02am  Out time: 9:00am 
Total Treatment Time (min): 58 Total Timed Codes (min): 58 
1:1 Treatment Time (min): 58 Visit #: 5 Treatment Area: Back pain [M54.9] SUBJECTIVE Pain Level (0-10 scale): 0/10 Any medication changes, allergies to medications, adverse drug reactions, diagnosis change, or new procedure performed?: [x] No    [] Yes (see summary sheet for update) Subjective functional status/changes:   [] No changes reported Pt continues to complain of right-sided neck pain. OBJECTIVE 40 min Therapeutic Exercise:  [x] See flow sheet :  
Rationale: increase ROM, increase strength and improve coordination to improve the patients ability to perform functional activities with increased ease 18 min Manual Therapy: STM bilateral UTs and cervical paraspinals; PROM cervical side-bending left/right and rotation left/right; gentle cervical traction; suboccipital inhibition Rationale: decrease pain, increase ROM, increase tissue extensibility, decrease trigger points and increase postural awareness to improve the patients ability to perform functional activities with increased ease With 
 [] TE 
 [] TA 
 [] neuro 
 [] other: Patient Education: [x] Review HEP-  
[] Progressed/Changed HEP based on:  
[] positioning   [] body mechanics   [] transfers   [] heat/ice application   
[] other:   
 
Other Objective/Functional Measures: --  
 
Pain Level (0-10 scale) post treatment: 0/10 ASSESSMENT/Changes in Function:  
Pt is working hard with exercises.   Recommend continuing outpatient PT 1x/week x at least 2 weeks to maximize rehab potential; to modify and progress therapeutic interventions, address functional mobility deficits, Please place orders for DXA and labs and will call the patient to assist with arranging all appointments. e   address ROM deficits, address strength deficits, analyze and address soft tissue restrictions, analyze and cue movement patterns, analyze and modify body mechanics/ergonomics and assess and modify postural abnormalities to attain remaining goals. []  See Plan of Care 
[]  See progress note/recertification 
[]  See Discharge Summary Progress towards goals / Updated goals: 
Short Term Goals: To be accomplished in 3 weeks: 
1) Pt will be independent with HEP. MET 
2) Pt will be able to sit with upright posture >/= 5 minutes without increase of symptoms. MET 
3) Pt will report improvement in cervical Rotation to look over shoulders while driving. MET 
  
Long Term Goals: To be accomplished in 6 weeks: 
1) Pt will be able to read without increase of neck pain. 2) Pt will be able to look over shoulders while driving without increase of neck pain. MET 
3) Pt will demonstrate ability to lift >/= 20 lbs without increase of symptoms. 4) Pt will be able to sleep through the night without waking in pain. MET 
5) Pt will be able to rise from supine to sitting without head lag or pain. 
  
PLAN [x]  Upgrade activities as tolerated     [x]  Continue plan of care 
[]  Update interventions per flow sheet      
[]  Discharge due to:_ 
[]  Other:_   
 
Roseline Tinsley, PT, DPT  2/14/2019

## 2021-06-25 ENCOUNTER — TRANSCRIBE ORDER (OUTPATIENT)
Dept: SCHEDULING | Age: 66
End: 2021-06-25

## 2021-06-25 DIAGNOSIS — Z12.31 VISIT FOR SCREENING MAMMOGRAM: Primary | ICD-10-CM

## 2021-07-01 ENCOUNTER — TRANSCRIBE ORDER (OUTPATIENT)
Dept: GENERAL RADIOLOGY | Age: 66
End: 2021-07-01

## 2021-07-01 ENCOUNTER — HOSPITAL ENCOUNTER (OUTPATIENT)
Dept: MAMMOGRAPHY | Age: 66
Discharge: HOME | End: 2021-07-01
Attending: INTERNAL MEDICINE
Payer: MEDICARE

## 2021-07-01 DIAGNOSIS — Z12.31 VISIT FOR SCREENING MAMMOGRAM: Primary | ICD-10-CM

## 2021-07-01 DIAGNOSIS — Z12.31 VISIT FOR SCREENING MAMMOGRAM: ICD-10-CM

## 2021-07-01 PROCEDURE — 77063 BREAST TOMOSYNTHESIS BI: CPT

## 2021-10-04 ENCOUNTER — OFFICE VISIT (OUTPATIENT)
Dept: INTERNAL MEDICINE CLINIC | Age: 66
End: 2021-10-04
Payer: MEDICARE

## 2021-10-04 VITALS
HEART RATE: 70 BPM | OXYGEN SATURATION: 98 % | HEIGHT: 62 IN | DIASTOLIC BLOOD PRESSURE: 80 MMHG | TEMPERATURE: 97.1 F | BODY MASS INDEX: 30.18 KG/M2 | RESPIRATION RATE: 16 BRPM | SYSTOLIC BLOOD PRESSURE: 150 MMHG | WEIGHT: 164 LBS

## 2021-10-04 DIAGNOSIS — E78.00 ELEVATED LDL CHOLESTEROL LEVEL: ICD-10-CM

## 2021-10-04 DIAGNOSIS — Z13.31 SCREENING FOR DEPRESSION: ICD-10-CM

## 2021-10-04 DIAGNOSIS — F51.01 PRIMARY INSOMNIA: ICD-10-CM

## 2021-10-04 DIAGNOSIS — I10 ESSENTIAL HYPERTENSION: ICD-10-CM

## 2021-10-04 DIAGNOSIS — M81.0 AGE-RELATED OSTEOPOROSIS WITHOUT CURRENT PATHOLOGICAL FRACTURE: ICD-10-CM

## 2021-10-04 DIAGNOSIS — Z23 NEEDS FLU SHOT: ICD-10-CM

## 2021-10-04 DIAGNOSIS — Z00.00 INITIAL MEDICARE ANNUAL WELLNESS VISIT: Primary | ICD-10-CM

## 2021-10-04 PROCEDURE — G8536 NO DOC ELDER MAL SCRN: HCPCS | Performed by: INTERNAL MEDICINE

## 2021-10-04 PROCEDURE — 3017F COLORECTAL CA SCREEN DOC REV: CPT | Performed by: INTERNAL MEDICINE

## 2021-10-04 PROCEDURE — 1101F PT FALLS ASSESS-DOCD LE1/YR: CPT | Performed by: INTERNAL MEDICINE

## 2021-10-04 PROCEDURE — G9899 SCRN MAM PERF RSLTS DOC: HCPCS | Performed by: INTERNAL MEDICINE

## 2021-10-04 PROCEDURE — G8510 SCR DEP NEG, NO PLAN REQD: HCPCS | Performed by: INTERNAL MEDICINE

## 2021-10-04 PROCEDURE — G8427 DOCREV CUR MEDS BY ELIG CLIN: HCPCS | Performed by: INTERNAL MEDICINE

## 2021-10-04 PROCEDURE — 90694 VACC AIIV4 NO PRSRV 0.5ML IM: CPT | Performed by: INTERNAL MEDICINE

## 2021-10-04 PROCEDURE — G8417 CALC BMI ABV UP PARAM F/U: HCPCS | Performed by: INTERNAL MEDICINE

## 2021-10-04 PROCEDURE — G0438 PPPS, INITIAL VISIT: HCPCS | Performed by: INTERNAL MEDICINE

## 2021-10-04 RX ORDER — HYDROCHLOROTHIAZIDE 12.5 MG/1
12.5 TABLET ORAL DAILY
Qty: 30 TABLET | Refills: 5 | Status: SHIPPED | OUTPATIENT
Start: 2021-10-04 | End: 2022-01-06

## 2021-10-04 NOTE — PROGRESS NOTES
HISTORY OF PRESENT ILLNESS  Jesenia Mims is a 77 y.o. female. HPI  Assessment: Briseyda Sherwood is seen today for a Medicare Wellness Visit and for follow up of chronic problems. Preventive Care. See attached Wellness Visit note. She is due for the shingles vaccine, bone density study and labs. She is up to date with other vaccinations, colonoscopy and a mammogram.   Stool testing for occult blood is not indicated. Chronic Problems Reviewed. Osteoporosis treatment in 2018 was not undertaken secondary to cost. Reviewed with her there may be other options that are more affordable for her. New Problem Reviewed. Elevated blood pressure. I think she is going to need treatment. She will work on the [employer]. She will return in one month. Review of Systems   Constitutional: Negative for chills, fever and weight loss. Respiratory: Negative. Cardiovascular: Negative for chest pain, palpitations, leg swelling and PND. Gastrointestinal:        Once per month- explosive diarrhea    Genitourinary: Negative. Musculoskeletal: Positive for back pain and myalgias. Intermittent low back pain    Occasional hs cramps   Neurological: Negative for focal weakness. Physical Exam  Vitals and nursing note reviewed. Constitutional:       General: She is not in acute distress. Appearance: She is well-developed. HENT:      Head: Normocephalic and atraumatic. Right Ear: Tympanic membrane, ear canal and external ear normal.      Left Ear: Tympanic membrane, ear canal and external ear normal.   Eyes:      General:         Right eye: No discharge. Left eye: No discharge. Pupils: Pupils are equal, round, and reactive to light. Neck:      Thyroid: No thyromegaly. Vascular: No carotid bruit. Cardiovascular:      Rate and Rhythm: Normal rate and regular rhythm. Heart sounds: Normal heart sounds. No murmur heard. No friction rub. No gallop.     Pulmonary:      Effort: Pulmonary effort is normal. No respiratory distress. Breath sounds: Normal breath sounds. No wheezing or rales. Abdominal:      General: Bowel sounds are normal. There is no distension. Palpations: Abdomen is soft. There is no mass. Tenderness: There is no abdominal tenderness. There is no guarding or rebound. Musculoskeletal:         General: No tenderness. Normal range of motion. Cervical back: Normal range of motion and neck supple. Lymphadenopathy:      Cervical: No cervical adenopathy. Skin:     General: Skin is warm and dry. Findings: No rash. Neurological:      Mental Status: She is alert and oriented to person, place, and time. Deep Tendon Reflexes: Reflexes are normal and symmetric. Psychiatric:         Behavior: Behavior normal.         ASSESSMENT and PLAN  Diagnoses and all orders for this visit:    1. Initial Medicare annual wellness visit    2. Needs flu shot  -     FLU (FLUAD QUAD INFLUENZA VACCINE,QUAD,ADJUVANTED)    3. Primary insomnia    4. Elevated LDL cholesterol level    5. Screening for depression  -     DEPRESSION SCREEN ANNUAL    6. Essential hypertension      This is an Initial Medicare Annual Wellness Exam (AWV) (Performed 12 months after IPPE or effective date of Medicare Part B enrollment, Once in a lifetime)    I have reviewed the patient's medical history in detail and updated the computerized patient record. Assessment/Plan   Education and counseling provided:  Are appropriate based on today's review and evaluation    1. Initial Medicare annual wellness visit  2. Needs flu shot  -     FLU (FLUAD QUAD INFLUENZA VACCINE,QUAD,ADJUVANTED)  3. Primary insomnia  4. Elevated LDL cholesterol level  5.  Screening for depression  -     DEPRESSION SCREEN ANNUAL       Depression Risk Factor Screening     3 most recent PHQ Screens 10/4/2021   Little interest or pleasure in doing things Not at all   Feeling down, depressed, irritable, or hopeless Not at all   Total Score PHQ 2 0       Alcohol Risk Screen    Do you average more than 1 drink per night or more than 7 drinks a week:  No- 12 per month    On any one occasion in the past three months have you have had more than 3 drinks containing alcohol:  No         Functional Ability and Level of Safety    Hearing: loss is mild     Activities of Daily Living: The home contains: no safety equipment. Patient does total self care     Ambulation: with no difficulty      Fall Risk:  Fall Risk Assessment, last 12 mths 10/4/2021   Able to walk? Yes   Fall in past 12 months? 0   Do you feel unsteady? 0   Are you worried about falling 1   Number of falls in past 12 months -   Fall with injury?  -      Abuse Screen:  Patient is not abused       Cognitive Screening    Has your family/caregiver stated any concerns about your memory:          Health Maintenance Due     Health Maintenance Due   Topic Date Due    COVID-19 Vaccine (1) Never done    Shingrix Vaccine Age 50> (1 of 2) Never done    Flu Vaccine (1) 09/01/2021       Patient Care Team   Patient Care Team:  Dean Faulkner MD as PCP - General (Internal Medicine)  Dean Faulkner MD as PCP - [facility] Empaneled Provider  Katarina Zabala MD as Consulting Provider (Endocrinology)  Cailin Narayan MD as Surgeon (General Surgery)  Dhaval Burleson MD as Surgeon (General Surgery)    History     Patient Active Problem List   Diagnosis Code    Insomnia, unspecified G47.00    Pernicious anemia D51.0    Vitamin D deficiency E55.9    Reflux esophagitis K21.00    Gastric ulcer K25.9    Osteoporosis M81.0    Iron deficiency anemia, unspecified D50.9    Onychomycosis B35.1    Active advance directive on file Z78.9    Hypercalcemia E83.52    H/O hyperparathyroidism Z86.39    Broken toe S92.919A    B12 deficiency E53.8    Gastroesophageal reflux disease without esophagitis K21.9    Diverticulosis K57.90    Elevated TSH R79.89     Past Medical History: Diagnosis Date    Arthritis     OSTEO    Broken toe 2017    right #5    Chronic pain     LT. ARM    GERD (gastroesophageal reflux disease)     History of blood transfusion 1995    [place], NO REACTION; needed transfusion from surgery-ovarian cyst and hit artery per pt.  Morbid obesity ([address] 3/23/2011    PUD (peptic ulcer disease)       Past Surgical History:   Procedure Laterality Date    COLONOSCOPY Left 3/20/2018    COLONOSCOPY performed by Jayda Alvarez MD at [address] [place] [address] ENDOSCOPY    HX ABDOMINOPLASTY  2006    HX ADENOIDECTOMY      HX CARPAL TUNNEL RELEASE  2001    RIGHT    HX CHOLECYSTECTOMY  2000    HX CHOLECYSTECTOMY  2000    at time of gastric bypass     HX COLONOSCOPY      2014, due 17 vs 19    HX GASTRIC BYPASS  12-13-00    dr. Wen Head HX GI  2016    endoscopy, colonoscopy    HX HEENT  1995    TUMOR REMOVED neck (benign)    HX HEENT  05/2017    PARATHYROID EXCISION    HX HYSTERECTOMY  1993    HX ORTHOPAEDIC      CYCST REMOVED BASE OF SPINE AGE 23    HX ORTHOPAEDIC Right 2004,2009    RIGHT ANKLE FX    HX ORTHOPAEDIC Left 4/2012    LT.  SHOULDER AND HUMERUS FX, SCREW AND PLATE ( has been removed)    HX ORTHOPAEDIC Right 2014    shoulder fx, orif    HX ORTHOPAEDIC Right     CARPAL TUNNEL    HX OTHER SURGICAL      TUMOR REMOVED RIGHT UPPER FLANK AREA    HX OTHER SURGICAL  05/08/2018    resection of gastric pouch by Dr Daniel Prior BIOPSY/EXCISION, LYMPH NODE(S)      [place] BREAST SURGERY PROCEDURE UNLISTED Left     LT. TUMOR BENIGN REMOVED AGE 18    [place] ENDOCRINE SURGERY PROC UNLISTED      [place] LEG/ANKLE SURGERY PROC UNLISTED      right ankle cartilage    [place] LEG/ANKLE SURGERY PROC UNLISTED  2009     ankle     Current Outpatient Medications   Medication Sig Dispense Refill    diphenhydrAMINE (BENADRYL) 25 mg capsule Take 1 Cap by mouth nightly as needed for Sleep. 30 Cap 11     Allergies   Allergen Reactions    Codeine Hives     Tolerates Dilaudid       Family History   Problem Relation Age of Onset    Other Mother         hypoglycemia, obese,Iza corey syndrome    Cancer Mother         LUNG    Lung Disease Mother     Obesity Mother     Cancer Father         GENERALIZED    Other Father         [place] Isidoro corey syndrome gene trait    Asthma Sister     Lung Disease Sister     Hypertension Brother     Lung Disease Brother     Diabetes Brother     Thyroid Disease Brother     Other Brother         [place] corey syndrome    Cancer Brother         COLON    Heart Disease Maternal Aunt     Cancer Maternal Aunt     Cancer Maternal Uncle         UNKNOWN    Cancer Paternal Uncle     Heart Disease Maternal Grandmother     Heart Disease Maternal Grandfather     Heart Disease Paternal Grandmother     Heart Disease Paternal Grandfather     Cancer Paternal Uncle     Lung Disease Brother     Cancer Maternal Uncle     Cancer Paternal Aunt         BREAST    Anesth Problems Neg Hx      Social History     Tobacco Use    Smoking status: Current Some Day Smoker     Packs/day: 0.50     Years: 7.00     Pack years: 3.50     Types: Cigarettes     Last attempt to quit: 3/18/2018     Years since quitting: 3.5    Smokeless tobacco: Never Used   Substance Use Topics    Alcohol use:  Yes     Alcohol/week: 4.0 standard drinks     Types: 1 Glasses of wine, 3 Cans of beer per week     Comment: 6 PACK/every 2 weeks       Dillon Butler MD

## 2021-10-04 NOTE — PATIENT INSTRUCTIONS
Vaccine Information Statement    Influenza (Flu) Vaccine (Inactivated or Recombinant): What You Need to Know    Many vaccine information statements are available in Pashto and other languages. See www.immunize.org/vis. Hojas de información sobre vacunas están disponibles en español y en muchos otros idiomas. Visite www.immunize.org/vis. 1. Why get vaccinated? Influenza vaccine can prevent influenza (flu). Flu is a contagious disease that spreads around the [facility] every year, usually between October and May. Anyone can get the flu, but it is more dangerous for some people. Infants and young children, people 72 years and older, pregnant people, and people with certain health conditions or a weakened immune system are at greatest risk of flu complications. Pneumonia, bronchitis, sinus infections, and ear infections are examples of flu-related complications. If you have a medical condition, such as heart disease, cancer, or diabetes, flu can make it worse. Flu can cause fever and chills, sore throat, muscle aches, fatigue, cough, headache, and runny or stuffy nose. Some people may have vomiting and diarrhea, though this is more common in children than adults. In an average year, thousands of people in the [facility] die from flu, and many more are hospitalized. Flu vaccine prevents millions of illnesses and flu-related visits to the doctor each year. 2. Influenza vaccines     CDC recommends everyone 6 months and older get vaccinated every flu season. Children 6 months through 6years of age may need 2 doses during a single flu season. Everyone else needs only 1 dose each flu season. It takes about 2 weeks for protection to develop after vaccination. There are many flu viruses, and they are always changing. Each year a new flu vaccine is made to protect against the influenza viruses believed to be likely to cause disease in the upcoming flu season.  Even when the vaccine doesnt exactly match these viruses, it may still provide some protection. Influenza vaccine does not cause flu. Influenza vaccine may be given at the same time as other vaccines. 3. Talk with your health care provider    Tell your vaccination provider if the person getting the vaccine:   Has had an allergic reaction after a previous dose of influenza vaccine, or has any severe, life-threatening allergies    Has ever had Guillain-Barré Syndrome (also called GBS)    In some cases, your health care provider may decide to postpone influenza vaccination until a future visit. Influenza vaccine can be administered at any time during pregnancy. People who are or will be pregnant during influenza season should receive inactivated influenza vaccine. People with minor illnesses, such as a cold, may be vaccinated. People who are moderately or severely ill should usually wait until they recover before getting influenza vaccine. Your health care provider can give you more information. 4. Risks of a vaccine reaction     Soreness, redness, and swelling where the shot is given, fever, muscle aches, and headache can happen after influenza vaccination.  There may be a very small increased risk of Guillain-Barré Syndrome (GBS) after inactivated influenza vaccine (the flu shot). Shyanne Peaks children who get the flu shot along with pneumococcal vaccine (PCV13) and/or DTaP vaccine at the same time might be slightly more likely to have a seizure caused by fever. Tell your health care provider if a child who is getting flu vaccine has ever had a seizure. People sometimes faint after medical procedures, including vaccination. Tell your provider if you feel dizzy or have vision changes or ringing in the ears. As with any medicine, there is a very remote chance of a vaccine causing a severe allergic reaction, other serious injury, or death. 5. What if there is a serious problem?     An allergic reaction could occur after the vaccinated person leaves the clinic. If you see signs of a severe allergic reaction (hives, swelling of the face and throat, difficulty breathing, a fast heartbeat, dizziness, or weakness), call 9-1-1 and get the person to the nearest hospital.    For other signs that concern you, call your health care provider. Adverse reactions should be reported to the Vaccine Adverse Event Reporting System (VAERS). Your health care provider will usually file this report, or you can do it yourself. Visit the VAERS website at www.vaers. [facility].gov or call 2-761.204.9527. VAERS is only for reporting reactions, and VAERS staff members do not give medical advice. 6. The National Vaccine Injury Compensation Program    The [facility] Vaccine Injury Compensation Program (VICP) is a federal program that was created to compensate people who may have been injured by certain vaccines. Claims regarding alleged injury or death due to vaccination have a time limit for filing, which may be as short as two years. Visit the VICP website at www.[facility].gov/vaccinecompensation or call 3-524.359.2617 to learn about the program and about filing a claim. 7. How can I learn more?  Ask your health care provider.  Call your local or state health department.  Visit the website of the Food and Drug Administration (FDA) for vaccine package inserts and additional information at www.fda.gov/vaccines-blood-biologics/vaccines.  Contact the Centers for Disease Control and Prevention (CDC):  - Call 1-963.899.6049 (5-162-JUI-INFO) or  - Visit CDCs influenza website at www.cdc.gov/flu. Vaccine Information Statement   Inactivated Influenza Vaccine   8/6/2021  [address] [id number]   Department of Health and Human Services  Centers for Disease Control and Prevention    Office Use Only      Medicare Wellness Visit, Female     The best way to live healthy is to have a lifestyle where you eat a well-balanced diet, exercise regularly, limit alcohol use, and quit all forms of tobacco/nicotine, if applicable. Regular preventive services are another way to keep healthy. Preventive services (vaccines, screening tests, monitoring & exams) can help personalize your care plan, which helps you manage your own care. Screening tests can find health problems at the earliest stages, when they are easiest to treat. [facility] follows the current, evidence-based guidelines published by the [facility] ([facility]) when recommending preventive services for our patients. Because we follow these guidelines, sometimes recommendations change over time as research supports it. (For example, mammograms used to be recommended annually. Even though Medicare will still pay for an annual mammogram, the newer guidelines recommend a mammogram every two years for women of average risk). Of course, you and your doctor may decide to screen more often for some diseases, based on your risk and your co-morbidities (chronic disease you are already diagnosed with). Preventive services for you include:  - Medicare offers their members a free annual wellness visit, which is time for you and your primary care provider to discuss and plan for your preventive service needs. Take advantage of this benefit every year!  -All adults over the age of 72 should receive the recommended pneumonia vaccines. Current USPSTF guidelines recommend a series of two vaccines for the best pneumonia protection.   -All adults should have a flu vaccine yearly and a tetanus vaccine every 10 years.   -All adults age 48 and older should receive the shingles vaccines (series of two vaccines).       -All adults age 38-68 who are overweight should have a diabetes screening test once every three years.   -All adults born between [birth year] and 1965 should be screened once for Hepatitis C.  -Other screening tests and preventive services for persons with diabetes include: an eye exam to screen for diabetic retinopathy, a kidney function test, a foot exam, and stricter control over your cholesterol.   -Cardiovascular screening for adults with routine risk involves an electrocardiogram (ECG) at intervals determined by your doctor.   -Colorectal cancer screenings should be done for adults age 54-65 with no increased risk factors for colorectal cancer. There are a number of acceptable methods of screening for this type of cancer. Each test has its own benefits and drawbacks. Discuss with your doctor what is most appropriate for you during your annual wellness visit. The different tests include: colonoscopy (considered the best screening method), a fecal occult blood test, a fecal DNA test, and sigmoidoscopy.    -A bone mass density test is recommended when a woman turns 65 to screen for osteoporosis. This test is only recommended one time, as a screening. Some providers will use this same test as a disease monitoring tool if you already have osteoporosis. -Breast cancer screenings are recommended every other year for women of normal risk, age 54-69.  -Cervical cancer screenings for women over age 72 are only recommended with certain risk factors. Here is a list of your current Health Maintenance items (your personalized list of preventive services) with a due date:  Health Maintenance Due   Topic Date Due    COVID-19 Vaccine (1) Never done    Shingles Vaccine (1 of 2) Never done    Yearly Flu Vaccine (1) 09/01/2021            DASH Diet: Care Instructions  Your Care Instructions     The DASH diet is an eating plan that can help lower your blood pressure. DASH stands for Dietary Approaches to Stop Hypertension. Hypertension is high blood pressure. The DASH diet focuses on eating foods that are high in calcium, potassium, and magnesium. These nutrients can lower blood pressure.  The foods that are highest in these nutrients are fruits, vegetables, low-fat dairy products, nuts, seeds, and legumes. But taking calcium, potassium, and magnesium supplements instead of eating foods that are high in those nutrients does not have the same effect. The DASH diet also includes whole grains, fish, and poultry. The DASH diet is one of several lifestyle changes your doctor may recommend to lower your high blood pressure. Your doctor may also want you to decrease the amount of sodium in your diet. Lowering sodium while following the DASH diet can lower blood pressure even further than just the DASH diet alone. Follow-up care is a key part of your treatment and safety. Be sure to make and go to all appointments, and call your doctor if you are having problems. It's also a good idea to know your test results and keep a list of the medicines you take. How can you care for yourself at home? Following the DASH diet  · Eat 4 to 5 servings of fruit each day. A serving is 1 medium-sized piece of fruit, ½ cup chopped or canned fruit, 1/4 cup dried fruit, or 4 ounces (½ cup) of fruit juice. Choose fruit more often than fruit juice. · Eat 4 to 5 servings of vegetables each day. A serving is 1 cup of lettuce or raw leafy vegetables, ½ cup of chopped or cooked vegetables, or 4 ounces (½ cup) of vegetable juice. Choose vegetables more often than vegetable juice. · Get 2 to 3 servings of low-fat and fat-free dairy each day. A serving is 8 ounces of milk, 1 cup of yogurt, or 1 ½ ounces of cheese. · Eat 6 to 8 servings of grains each day. A serving is 1 slice of bread, 1 ounce of dry cereal, or ½ cup of cooked rice, pasta, or cooked cereal. Try to choose whole-grain products as much as possible. · Limit lean meat, poultry, and fish to 2 servings each day. A serving is 3 ounces, about the size of a deck of cards. · Eat 4 to 5 servings of nuts, seeds, and legumes (cooked dried beans, lentils, and split peas) each week.  A serving is 1/3 cup of nuts, 2 tablespoons of seeds, or ½ cup of cooked beans or peas.  · Limit fats and oils to 2 to 3 servings each day. A serving is 1 teaspoon of vegetable oil or 2 tablespoons of salad dressing. · Limit sweets and added sugars to 5 servings or less a week. A serving is 1 tablespoon jelly or jam, ½ cup sorbet, or 1 cup of lemonade. · Eat less than 2,300 milligrams (mg) of sodium a day. If you limit your sodium to 1,500 mg a day, you can lower your blood pressure even more. · Be aware that all of these are the suggested number of servings for people who eat 1,800 to 2,000 calories a day. Your recommended number of servings may be different if you need more or fewer calories. Tips for success  · Start small. Do not try to make dramatic changes to your diet all at once. You might feel that you are missing out on your favorite foods and then be more likely to not follow the plan. Make small changes, and stick with them. Once those changes become habit, add a few more changes. · Try some of the following:  ? Make it a goal to eat a fruit or vegetable at every meal and at snacks. This will make it easy to get the recommended amount of fruits and vegetables each day. ? Try yogurt topped with fruit and nuts for a snack or healthy dessert. ? Add lettuce, tomato, cucumber, and onion to sandwiches. ? Combine a ready-made pizza crust with low-fat mozzarella cheese and lots of vegetable toppings. Try using tomatoes, squash, spinach, broccoli, carrots, cauliflower, and onions. ? Have a variety of cut-up vegetables with a low-fat dip as an appetizer instead of chips and dip. ? Sprinkle sunflower seeds or chopped almonds over salads. Or try adding chopped walnuts or almonds to cooked vegetables. ? Try some vegetarian meals using beans and peas. Add garbanzo or kidney beans to salads. Make burritos and tacos with mashed arboleda beans or black beans. Where can you learn more?   Go to http://www.gray.com/  Enter H967 in the search box to learn more about \"DASH Diet: Care Instructions. \"  Current as of: April 29, 2021               Content Version: 13.0  © 1566-6613 [employer]. Care instructions adapted under license by [employer] (which disclaims liability or warranty for this information). If you have questions about a medical condition or this instruction, always ask your healthcare professional. [place] [address] any warranty or liability for your use of this information.

## 2021-10-04 NOTE — PROGRESS NOTES
Verified name and birth date for privacy precautions. Chart reviewed in preparation for today's visit. Chief Complaint   Patient presents with    Complete Physical          Health Maintenance Due   Topic    COVID-19 Vaccine (1)    Shingrix Vaccine Age 49> (1 of 2)    Medicare Yearly Exam     Flu Vaccine (1)         Wt Readings from Last 3 Encounters:   10/04/21 164 lb (74.4 kg)   06/25/20 156 lb 12.8 oz (71.1 kg)   05/06/19 154 lb (69.9 kg)     Temp Readings from Last 3 Encounters:   10/04/21 97.1 °F (36.2 °C) (Temporal)   06/25/20 97.6 °F (36.4 °C)   05/06/19 97.9 °F (36.6 °C) (Oral)     BP Readings from Last 3 Encounters:   10/04/21 (!) 150/80   06/25/20 154/84   05/06/19 110/84     Pulse Readings from Last 3 Encounters:   10/04/21 70   06/25/20 85   05/06/19 69         Learning Assessment:  :     Learning Assessment 2/21/2017 4/7/2015 4/23/2014   PRIMARY LEARNER Patient Patient Patient   HIGHEST LEVEL OF EDUCATION - PRIMARY LEARNER  GRADUATED HIGH SCHOOL OR GED - GRADUATED HIGH SCHOOL OR GED   BARRIERS PRIMARY LEARNER NONE - NONE   CO-LEARNER CAREGIVER - No No   PRIMARY LANGUAGE ENGLISH ENGLISH ENGLISH   LEARNER PREFERENCE PRIMARY READING LISTENING LISTENING     READING - -   ANSWERED BY patient self patient   RELATIONSHIP SELF SELF SELF       Depression Screening:  :     3 most recent PHQ Screens 10/4/2021   Little interest or pleasure in doing things Not at all   Feeling down, depressed, irritable, or hopeless Not at all   Total Score PHQ 2 0       Fall Risk Assessment:  :     Fall Risk Assessment, last 12 mths 10/4/2021   Able to walk? Yes   Fall in past 12 months? 0   Do you feel unsteady? 0   Are you worried about falling 1   Number of falls in past 12 months -   Fall with injury? -       Abuse Screening:  :     Abuse Screening Questionnaire 10/4/2021 2/11/2019   Do you ever feel afraid of your partner? N N   Are you in a relationship with someone who physically or mentally threatens you?  N N   Is it safe for you to go home?  Finley Lesches

## 2021-10-07 ENCOUNTER — HOSPITAL ENCOUNTER (OUTPATIENT)
Dept: BONE DENSITY | Age: 66
Discharge: HOME | End: 2021-10-07
Attending: INTERNAL MEDICINE
Payer: MEDICARE

## 2021-10-07 DIAGNOSIS — M81.0 AGE-RELATED OSTEOPOROSIS WITHOUT CURRENT PATHOLOGICAL FRACTURE: ICD-10-CM

## 2021-10-07 PROCEDURE — 77080 DXA BONE DENSITY AXIAL: CPT

## 2021-10-09 LAB
25(OH)D3+25(OH)D2 SERPL-MCNC: 30.2 NG/ML (ref 30–100)
ALBUMIN SERPL-MCNC: 4.4 G/DL (ref 3.8–4.8)
ALBUMIN/GLOB SERPL: 1.8 {RATIO} (ref 1.2–2.2)
ALP SERPL-CCNC: 58 IU/L (ref 44–121)
ALT SERPL-CCNC: 9 IU/L (ref 0–32)
APPEARANCE UR: CLEAR
AST SERPL-CCNC: 11 IU/L (ref 0–40)
BACTERIA #/AREA URNS HPF: NORMAL /[HPF]
BASOPHILS # BLD AUTO: 0.1 X10E3/UL (ref 0–0.2)
BASOPHILS NFR BLD AUTO: 1 %
BILIRUB SERPL-MCNC: 0.4 MG/DL (ref 0–1.2)
BILIRUB UR QL STRIP: NEGATIVE
BUN SERPL-MCNC: 9 MG/DL (ref 8–27)
BUN/CREAT SERPL: 18 (ref 12–28)
CALCIUM SERPL-MCNC: 9.2 MG/DL (ref 8.7–10.3)
CASTS URNS QL MICRO: NORMAL /LPF
CHLORIDE SERPL-SCNC: 99 MMOL/L (ref 96–106)
CHOLEST SERPL-MCNC: 180 MG/DL (ref 100–199)
CO2 SERPL-SCNC: 25 MMOL/L (ref 20–29)
COLOR UR: YELLOW
CREAT SERPL-MCNC: 0.49 MG/DL (ref 0.57–1)
EOSINOPHIL # BLD AUTO: 0.3 X10E3/UL (ref 0–0.4)
EOSINOPHIL NFR BLD AUTO: 3 %
EPI CELLS #/AREA URNS HPF: NORMAL /HPF (ref 0–10)
ERYTHROCYTE [DISTWIDTH] IN BLOOD BY AUTOMATED COUNT: 15.1 % (ref 11.7–15.4)
GLOBULIN SER CALC-MCNC: 2.5 G/DL (ref 1.5–4.5)
GLUCOSE SERPL-MCNC: 99 MG/DL (ref 65–99)
GLUCOSE UR QL: NEGATIVE
HCT VFR BLD AUTO: 38.7 % (ref 34–46.6)
HDLC SERPL-MCNC: 66 MG/DL
HGB BLD-MCNC: 12 G/DL (ref 11.1–15.9)
HGB UR QL STRIP: NEGATIVE
IMM GRANULOCYTES # BLD AUTO: 0 X10E3/UL (ref 0–0.1)
IMM GRANULOCYTES NFR BLD AUTO: 0 %
KETONES UR QL STRIP: NEGATIVE
LDLC SERPL CALC-MCNC: 98 MG/DL (ref 0–99)
LEUKOCYTE ESTERASE UR QL STRIP: ABNORMAL
LYMPHOCYTES # BLD AUTO: 1.5 X10E3/UL (ref 0.7–3.1)
LYMPHOCYTES NFR BLD AUTO: 19 %
MCH RBC QN AUTO: 24 PG (ref 26.6–33)
MCHC RBC AUTO-ENTMCNC: 31 G/DL (ref 31.5–35.7)
MCV RBC AUTO: 77 FL (ref 79–97)
MICRO URNS: ABNORMAL
MONOCYTES # BLD AUTO: 0.6 X10E3/UL (ref 0.1–0.9)
MONOCYTES NFR BLD AUTO: 8 %
NEUTROPHILS # BLD AUTO: 5.6 X10E3/UL (ref 1.4–7)
NEUTROPHILS NFR BLD AUTO: 69 %
NITRITE UR QL STRIP: NEGATIVE
PH UR STRIP: 6.5 [PH] (ref 5–7.5)
PLATELET # BLD AUTO: 417 X10E3/UL (ref 150–450)
POTASSIUM SERPL-SCNC: 4.5 MMOL/L (ref 3.5–5.2)
PROT SERPL-MCNC: 6.9 G/DL (ref 6–8.5)
PROT UR QL STRIP: NEGATIVE
RBC # BLD AUTO: 5.01 X10E6/UL (ref 3.77–5.28)
RBC #/AREA URNS HPF: NORMAL /HPF (ref 0–2)
SODIUM SERPL-SCNC: 137 MMOL/L (ref 134–144)
SP GR UR: 1.01 (ref 1–1.03)
TRIGL SERPL-MCNC: 86 MG/DL (ref 0–149)
TSH SERPL DL<=0.005 MIU/L-ACNC: 2.49 UIU/ML (ref 0.45–4.5)
UROBILINOGEN UR STRIP-MCNC: 0.2 MG/DL (ref 0.2–1)
VLDLC SERPL CALC-MCNC: 16 MG/DL (ref 5–40)
WBC # BLD AUTO: 8.1 X10E3/UL (ref 3.4–10.8)
WBC #/AREA URNS HPF: NORMAL /HPF (ref 0–5)

## 2021-10-26 ENCOUNTER — TELEPHONE (OUTPATIENT)
Dept: INTERNAL MEDICINE CLINIC | Age: 66
End: 2021-10-26

## 2021-10-26 NOTE — TELEPHONE ENCOUNTER
Faxed pt's Reclast order to [facility] [phone]. Confirmation received. Order sent to stat scan in office.

## 2021-10-28 RX ORDER — SODIUM CHLORIDE 9 MG/ML
25 INJECTION, SOLUTION INTRAVENOUS CONTINUOUS
Status: DISCONTINUED | OUTPATIENT
Start: 2021-11-04 | End: 2021-11-05 | Stop reason: HOSPADM

## 2021-10-28 RX ORDER — ZOLEDRONIC ACID 5 MG/100ML
5 INJECTION, SOLUTION INTRAVENOUS ONCE
Status: COMPLETED | OUTPATIENT
Start: 2021-11-04 | End: 2021-11-04

## 2021-10-31 ENCOUNTER — TELEPHONE (OUTPATIENT)
Dept: INTERNAL MEDICINE CLINIC | Age: 66
End: 2021-10-31

## 2021-10-31 RX ORDER — MELATONIN
2000 DAILY
Qty: 60 TABLET | Refills: 5
Start: 2021-10-31

## 2021-10-31 NOTE — TELEPHONE ENCOUNTER
Reviewed lab , DEXA-   1. Needs therapy for osteoporosis, fx risk reduction. Advised Reclast, she accepts. Reviewed side effects, goal of treatment and need for follow up      2. Vitamin d is slightly low. Start vitamin d 2000 units every day, available over the counter.

## 2021-11-04 ENCOUNTER — OFFICE VISIT (OUTPATIENT)
Dept: INTERNAL MEDICINE CLINIC | Age: 66
End: 2021-11-04
Payer: MEDICARE

## 2021-11-04 ENCOUNTER — HOSPITAL ENCOUNTER (OUTPATIENT)
Dept: INFUSION THERAPY | Age: 66
Discharge: HOME | End: 2021-11-04
Payer: MEDICARE

## 2021-11-04 VITALS
RESPIRATION RATE: 16 BRPM | SYSTOLIC BLOOD PRESSURE: 115 MMHG | WEIGHT: 159 LBS | BODY MASS INDEX: 29.08 KG/M2 | DIASTOLIC BLOOD PRESSURE: 62 MMHG | TEMPERATURE: 98 F | HEART RATE: 61 BPM

## 2021-11-04 VITALS
HEIGHT: 62 IN | WEIGHT: 164 LBS | RESPIRATION RATE: 16 BRPM | HEART RATE: 67 BPM | OXYGEN SATURATION: 99 % | SYSTOLIC BLOOD PRESSURE: 126 MMHG | BODY MASS INDEX: 30.18 KG/M2 | DIASTOLIC BLOOD PRESSURE: 66 MMHG | TEMPERATURE: 96.3 F

## 2021-11-04 DIAGNOSIS — I10 ESSENTIAL HYPERTENSION: Primary | ICD-10-CM

## 2021-11-04 DIAGNOSIS — Z23 NEEDS FLU SHOT: ICD-10-CM

## 2021-11-04 PROCEDURE — G8417 CALC BMI ABV UP PARAM F/U: HCPCS | Performed by: INTERNAL MEDICINE

## 2021-11-04 PROCEDURE — 1090F PRES/ABSN URINE INCON ASSESS: CPT | Performed by: INTERNAL MEDICINE

## 2021-11-04 PROCEDURE — 96374 THER/PROPH/DIAG INJ IV PUSH: CPT

## 2021-11-04 PROCEDURE — G0463 HOSPITAL OUTPT CLINIC VISIT: HCPCS | Performed by: INTERNAL MEDICINE

## 2021-11-04 PROCEDURE — 1101F PT FALLS ASSESS-DOCD LE1/YR: CPT | Performed by: INTERNAL MEDICINE

## 2021-11-04 PROCEDURE — G9899 SCRN MAM PERF RSLTS DOC: HCPCS | Performed by: INTERNAL MEDICINE

## 2021-11-04 PROCEDURE — 3017F COLORECTAL CA SCREEN DOC REV: CPT | Performed by: INTERNAL MEDICINE

## 2021-11-04 PROCEDURE — G8510 SCR DEP NEG, NO PLAN REQD: HCPCS | Performed by: INTERNAL MEDICINE

## 2021-11-04 PROCEDURE — G8427 DOCREV CUR MEDS BY ELIG CLIN: HCPCS | Performed by: INTERNAL MEDICINE

## 2021-11-04 PROCEDURE — 74011000258 HC RX REV CODE- 258: Performed by: INTERNAL MEDICINE

## 2021-11-04 PROCEDURE — 74011250636 HC RX REV CODE- 250/636: Performed by: INTERNAL MEDICINE

## 2021-11-04 PROCEDURE — 99213 OFFICE O/P EST LOW 20 MIN: CPT | Performed by: INTERNAL MEDICINE

## 2021-11-04 PROCEDURE — G8536 NO DOC ELDER MAL SCRN: HCPCS | Performed by: INTERNAL MEDICINE

## 2021-11-04 RX ORDER — SODIUM CHLORIDE 0.9 % (FLUSH) 0.9 %
10 SYRINGE (ML) INJECTION AS NEEDED
Status: DISCONTINUED | OUTPATIENT
Start: 2021-11-04 | End: 2021-11-05 | Stop reason: HOSPADM

## 2021-11-04 RX ADMIN — Medication 10 ML: at 11:10

## 2021-11-04 RX ADMIN — SODIUM CHLORIDE 25 ML/HR: 900 INJECTION, SOLUTION INTRAVENOUS at 11:11

## 2021-11-04 RX ADMIN — ZOLEDRONIC ACID 5 MG: 5 INJECTION, SOLUTION INTRAVENOUS at 11:20

## 2021-11-04 NOTE — PATIENT INSTRUCTIONS
Vaccine Information Statement Influenza (Flu) Vaccine (Inactivated or Recombinant): What You Need to Know Many vaccine information statements are available in Georgian and other languages. See www.immunize.org/vis. Hojas de información sobre vacunas están disponibles en español y en muchos otros idiomas. Visite www.immunize.org/vis. 1. Why get vaccinated? Influenza vaccine can prevent influenza (flu). Flu is a contagious disease that spreads around the [facility] every year, usually between October and May. Anyone can get the flu, but it is more dangerous for some people. Infants and young children, people 72 years and older, pregnant people, and people with certain health conditions or a weakened immune system are at greatest risk of flu complications. Pneumonia, bronchitis, sinus infections, and ear infections are examples of flu-related complications. If you have a medical condition, such as heart disease, cancer, or diabetes, flu can make it worse. Flu can cause fever and chills, sore throat, muscle aches, fatigue, cough, headache, and runny or stuffy nose. Some people may have vomiting and diarrhea, though this is more common in children than adults. In an average year, thousands of people in the [facility] die from flu, and many more are hospitalized. Flu vaccine prevents millions of illnesses and flu-related visits to the doctor each year. 2. Influenza vaccines CDC recommends everyone 6 months and older get vaccinated every flu season. Children 6 months through 6years of age may need 2 doses during a single flu season. Everyone else needs only 1 dose each flu season. It takes about 2 weeks for protection to develop after vaccination. There are many flu viruses, and they are always changing. Each year a new flu vaccine is made to protect against the influenza viruses believed to be likely to cause disease in the upcoming flu season.  Even when the vaccine doesnt exactly match these viruses, it may still provide some protection. Influenza vaccine does not cause flu. Influenza vaccine may be given at the same time as other vaccines. 3. Talk with your health care provider Tell your vaccination provider if the person getting the vaccine: 
 Has had an allergic reaction after a previous dose of influenza vaccine, or has any severe, life-threatening allergies  Has ever had Guillain-Barré Syndrome (also called GBS) In some cases, your health care provider may decide to postpone influenza vaccination until a future visit. Influenza vaccine can be administered at any time during pregnancy. People who are or will be pregnant during influenza season should receive inactivated influenza vaccine. People with minor illnesses, such as a cold, may be vaccinated. People who are moderately or severely ill should usually wait until they recover before getting influenza vaccine. Your health care provider can give you more information. 4. Risks of a vaccine reaction  Soreness, redness, and swelling where the shot is given, fever, muscle aches, and headache can happen after influenza vaccination.  There may be a very small increased risk of Guillain-Barré Syndrome (GBS) after inactivated influenza vaccine (the flu shot). [facility] children who get the flu shot along with pneumococcal vaccine (PCV13) and/or DTaP vaccine at the same time might be slightly more likely to have a seizure caused by fever. Tell your health care provider if a child who is getting flu vaccine has ever had a seizure. People sometimes faint after medical procedures, including vaccination. Tell your provider if you feel dizzy or have vision changes or ringing in the ears. As with any medicine, there is a very remote chance of a vaccine causing a severe allergic reaction, other serious injury, or death. 5. What if there is a serious problem?  
 
An allergic reaction could occur after the vaccinated person leaves the clinic. If you see signs of a severe allergic reaction (hives, swelling of the face and throat, difficulty breathing, a fast heartbeat, dizziness, or weakness), call 9-1-1 and get the person to the nearest hospital. 
 
For other signs that concern you, call your health care provider. Adverse reactions should be reported to the Vaccine Adverse Event Reporting System (VAERS). Your health care provider will usually file this report, or you can do it yourself. Visit the VAERS website at www.vaers. [facility].gov or call 0-974.473.6656. VAERS is only for reporting reactions, and VAERS staff members do not give medical advice. 6. The National Vaccine Injury Compensation Program 
 
The [facility] Vaccine Injury Compensation Program (VICP) is a federal program that was created to compensate people who may have been injured by certain vaccines. Claims regarding alleged injury or death due to vaccination have a time limit for filing, which may be as short as two years. Visit the VICP website at www.[facility].gov/vaccinecompensation or call 6-770.532.1706 to learn about the program and about filing a claim. 7. How can I learn more?  Ask your health care provider.  Call your local or state health department.  Visit the website of the Food and Drug Administration (FDA) for vaccine package inserts and additional information at www.fda.gov/vaccines-blood-biologics/vaccines.  Contact the Centers for Disease Control and Prevention (CDC): 
- Call 8-865.555.2513 (1-800-CDC-INFO) or 
- Visit CDCs influenza website at www.cdc.gov/flu. Vaccine Information Statement Inactivated Influenza Vaccine 8/6/2021 
[address] [id number] Department of [email or web address] and [employer] Centers for Disease Control and Prevention Office Use Only

## 2021-11-04 NOTE — PROGRESS NOTES
HISTORY OF PRESENT ILLNESS  Antonio Montes is a 77 y.o. female. Hypertension   The history is provided by the patient (new HCTZ). This is a new problem. The problem has been gradually improving. Pertinent negatives include no dizziness and no shortness of breath. Risk factors include hypertension and stress. Review of Systems   Respiratory: Negative for shortness of breath. Gastrointestinal: Positive for diarrhea. ? Relating to post alan. Not bad enough to warrant treatment she feels. Will follow, diet adjust , call if treatment desired   Neurological: Negative for dizziness. Physical Exam  Vitals and nursing note reviewed. Constitutional:       General: She is not in acute distress. Cardiovascular:      Rate and Rhythm: Normal rate and regular rhythm. Heart sounds: No murmur heard. No friction rub. No gallop. Pulmonary:      Effort: Pulmonary effort is normal.      Breath sounds: Normal breath sounds. ASSESSMENT and PLAN  Diagnoses and all orders for this visit:    1. Essential hypertension- Continue current regimen of prescription and / or OTC medications     2.  Needs flu shot

## 2021-11-04 NOTE — PROGRESS NOTES
[address] [facility] @ [facility] VISIT NOTE     2277 Patient arrives for Yearly Reclast Infusion without acute problems. Please see connect [facility] for complete assessment and education provided. Vital signs stable throughout and prior to discharge, Pt. Tolerated treatment well and discharged without incident. Patient is aware of next [facility] appointment on 11/10/2022. Appointment card given to patient. Patient did wait 30 minutes post infusion for observation with no adverse reactions noted @ this time,     The patient/parent denies any symptoms of COVID (SOB, coughing, fever, or generally not feeling well), denies any known exposure to COVID-19 recently, and denies any known recent contact with family/friend that has a pending COVID test.      Medications Verified by Sheree Guerrero RN via [employer]:  1. Reclast 5 mg IVP  2. NS IV Flush & [address]  Patient Vitals for the past 12 hrs:   Temp Pulse Resp BP   11/04/21 1205 98 °F (36.7 °C) 61 16 115/62   11/04/21 1138  60 18 129/64   11/04/21 1055 97.9 °F (36.6 °C)  18 130/67     LAB WORK  Labs obtained 10/08/2021.

## 2021-11-04 NOTE — PROGRESS NOTES
Verified name and birth date for privacy precautions. Chart reviewed in preparation for today's visit. Chief Complaint   Patient presents with    Hypertension          Health Maintenance Due   Topic    Shingrix Vaccine Age 50> (1 of 2)         Wt Readings from Last 3 Encounters:   11/04/21 164 lb (74.4 kg)   10/04/21 164 lb (74.4 kg)   06/25/20 156 lb 12.8 oz (71.1 kg)     Temp Readings from Last 3 Encounters:   11/04/21 (!) 96.3 °F (35.7 °C) (Temporal)   10/04/21 97.1 °F (36.2 °C) (Temporal)   06/25/20 97.6 °F (36.4 °C)     BP Readings from Last 3 Encounters:   11/04/21 126/66   10/04/21 (!) 150/80   06/25/20 154/84     Pulse Readings from Last 3 Encounters:   11/04/21 67   10/04/21 70   06/25/20 85         Learning Assessment:  :     Learning Assessment 2/21/2017 4/7/2015 4/23/2014   PRIMARY LEARNER Patient Patient Patient   HIGHEST LEVEL OF EDUCATION - PRIMARY LEARNER  GRADUATED HIGH SCHOOL OR GED - GRADUATED HIGH SCHOOL OR GED   BARRIERS PRIMARY LEARNER NONE - NONE   CO-LEARNER CAREGIVER - No No   PRIMARY LANGUAGE ENGLISH ENGLISH ENGLISH   LEARNER PREFERENCE PRIMARY READING LISTENING LISTENING     READING - -   ANSWERED BY patient self patient   RELATIONSHIP SELF SELF SELF       Depression Screening:  :     3 most recent PHQ Screens 11/4/2021   Little interest or pleasure in doing things Not at all   Feeling down, depressed, irritable, or hopeless Not at all   Total Score PHQ 2 0       Fall Risk Assessment:  :     Fall Risk Assessment, last 12 mths 11/4/2021   Able to walk? Yes   Fall in past 12 months? 0   Do you feel unsteady? 0   Are you worried about falling 1   Number of falls in past 12 months -   Fall with injury? -       Abuse Screening:  :     Abuse Screening Questionnaire 11/4/2021 10/4/2021 2/11/2019   Do you ever feel afraid of your partner? N N N   Are you in a relationship with someone who physically or mentally threatens you? N N N   Is it safe for you to go home?  Frederic Munguia

## 2021-11-05 ENCOUNTER — APPOINTMENT (OUTPATIENT)
Dept: INFUSION THERAPY | Age: 66
End: 2021-11-05

## 2021-12-17 ENCOUNTER — TELEPHONE (OUTPATIENT)
Dept: INTERNAL MEDICINE CLINIC | Age: 66
End: 2021-12-17

## 2021-12-17 NOTE — TELEPHONE ENCOUNTER
Reason for call:  Pt says she has been sent a letter for jury duty and she wonders if dr Jarred Gaston could give her a letter to get dismissed from this because of her health?     Is this a new problem: yes     Date of last appointment:  11/4/2021     Can we respond via [employer]: no    Best call back number:    Sami Ledezma (Self) [phone] ()

## 2021-12-20 NOTE — TELEPHONE ENCOUNTER
Advised pt MD has done letter for her. She can print from My Chart. She states she will  today because she has no way to print from home.

## 2022-01-06 DIAGNOSIS — I10 ESSENTIAL HYPERTENSION: ICD-10-CM

## 2022-01-06 RX ORDER — HYDROCHLOROTHIAZIDE 12.5 MG/1
12.5 TABLET ORAL DAILY
Qty: 30 TABLET | Refills: 5 | Status: SHIPPED | OUTPATIENT
Start: 2022-01-06 | End: 2022-09-06 | Stop reason: SDUPTHER

## 2022-01-25 ENCOUNTER — OFFICE VISIT (OUTPATIENT)
Dept: URGENT CARE | Age: 67
End: 2022-01-25

## 2022-01-25 ENCOUNTER — TELEPHONE (OUTPATIENT)
Dept: SCHEDULING | Age: 67
End: 2022-01-25

## 2022-01-25 VITALS
TEMPERATURE: 98.5 F | DIASTOLIC BLOOD PRESSURE: 54 MMHG | WEIGHT: 140 LBS | OXYGEN SATURATION: 96 % | SYSTOLIC BLOOD PRESSURE: 108 MMHG | RESPIRATION RATE: 16 BRPM | HEART RATE: 87 BPM | BODY MASS INDEX: 26.43 KG/M2 | HEIGHT: 61 IN

## 2022-01-25 DIAGNOSIS — H66.001 NON-RECURRENT ACUTE SUPPURATIVE OTITIS MEDIA OF RIGHT EAR WITHOUT SPONTANEOUS RUPTURE OF TYMPANIC MEMBRANE: ICD-10-CM

## 2022-01-25 DIAGNOSIS — J01.00 ACUTE MAXILLARY SINUSITIS, RECURRENCE NOT SPECIFIED: Primary | ICD-10-CM

## 2022-01-25 PROCEDURE — U0005 INFEC AGEN DETEC AMPLI PROBE: HCPCS | Performed by: CLINICAL MEDICAL LABORATORY

## 2022-01-25 PROCEDURE — 99214 OFFICE O/P EST MOD 30 MIN: CPT | Performed by: NURSE PRACTITIONER

## 2022-01-25 PROCEDURE — U0003 INFECTIOUS AGENT DETECTION BY NUCLEIC ACID (DNA OR RNA); SEVERE ACUTE RESPIRATORY SYNDROME CORONAVIRUS 2 (SARS-COV-2) (CORONAVIRUS DISEASE [COVID-19]), AMPLIFIED PROBE TECHNIQUE, MAKING USE OF HIGH THROUGHPUT TECHNOLOGIES AS DESCRIBED BY CMS-2020-01-R: HCPCS | Performed by: CLINICAL MEDICAL LABORATORY

## 2022-01-25 RX ORDER — AZITHROMYCIN 250 MG/1
TABLET, FILM COATED ORAL
Qty: 6 TABLET | Refills: 0 | Status: SHIPPED | OUTPATIENT
Start: 2022-01-25 | End: 2022-05-04 | Stop reason: ALTCHOICE

## 2022-01-25 RX ORDER — BUPROPION HYDROCHLORIDE 150 MG/1
1 TABLET ORAL DAILY
COMMUNITY
Start: 2021-12-14

## 2022-01-25 RX ORDER — FLUTICASONE PROPIONATE 50 MCG
2 SPRAY, SUSPENSION (ML) NASAL DAILY
Qty: 1 EACH | Refills: 0 | Status: SHIPPED | OUTPATIENT
Start: 2022-01-25 | End: 2023-10-20 | Stop reason: ALTCHOICE

## 2022-01-25 RX ORDER — LISINOPRIL AND HYDROCHLOROTHIAZIDE 12.5; 1 MG/1; MG/1
1 TABLET ORAL DAILY
COMMUNITY
Start: 2021-12-14

## 2022-01-25 RX ORDER — SIMVASTATIN 40 MG
1 TABLET ORAL AT BEDTIME
COMMUNITY
Start: 2021-12-14

## 2022-01-25 ASSESSMENT — ENCOUNTER SYMPTOMS
RHINORRHEA: 0
SINUS PAIN: 1
RESPIRATORY NEGATIVE: 1
SWOLLEN GLANDS: 1
SHORTNESS OF BREATH: 0
EYES NEGATIVE: 1
WHEEZING: 0
ALLERGIC/IMMUNOLOGIC NEGATIVE: 1
ABDOMINAL PAIN: 0
VOMITING: 0
SORE THROAT: 1
CHILLS: 0
APPETITE CHANGE: 0
SINUS PRESSURE: 1
COUGH: 0
FATIGUE: 1
HEADACHES: 1
ACTIVITY CHANGE: 1
FEVER: 0
NAUSEA: 0
GASTROINTESTINAL NEGATIVE: 1

## 2022-01-26 LAB
SARS-COV-2 RNA RESP QL NAA+PROBE: NOT DETECTED
SERVICE CMNT-IMP: NORMAL
SERVICE CMNT-IMP: NORMAL

## 2022-03-07 ENCOUNTER — TELEPHONE (OUTPATIENT)
Dept: SURGERY | Age: 67
End: 2022-03-07

## 2022-03-18 PROBLEM — E53.8 B12 DEFICIENCY: Status: ACTIVE | Noted: 2017-11-21

## 2022-03-18 PROBLEM — R79.89 ELEVATED TSH: Status: ACTIVE | Noted: 2018-08-10

## 2022-03-18 PROBLEM — K57.90 DIVERTICULOSIS: Status: ACTIVE | Noted: 2018-03-20

## 2022-03-19 PROBLEM — Z86.39 H/O HYPERPARATHYROIDISM: Status: ACTIVE | Noted: 2017-06-20

## 2022-03-19 PROBLEM — E83.52 HYPERCALCEMIA: Status: ACTIVE | Noted: 2017-02-21

## 2022-03-20 PROBLEM — K21.9 GASTROESOPHAGEAL REFLUX DISEASE WITHOUT ESOPHAGITIS: Status: ACTIVE | Noted: 2018-02-07

## 2022-04-07 ENCOUNTER — WALK IN (OUTPATIENT)
Dept: URGENT CARE | Age: 67
End: 2022-04-07

## 2022-04-07 VITALS
RESPIRATION RATE: 16 BRPM | WEIGHT: 138 LBS | HEART RATE: 80 BPM | BODY MASS INDEX: 25.4 KG/M2 | SYSTOLIC BLOOD PRESSURE: 110 MMHG | HEIGHT: 62 IN | DIASTOLIC BLOOD PRESSURE: 80 MMHG | TEMPERATURE: 99.2 F

## 2022-04-07 DIAGNOSIS — J01.00 ACUTE NON-RECURRENT MAXILLARY SINUSITIS: Primary | ICD-10-CM

## 2022-04-07 PROBLEM — I10 ESSENTIAL HYPERTENSION: Status: ACTIVE | Noted: 2021-06-18

## 2022-04-07 PROBLEM — J32.0 CHRONIC MAXILLARY SINUSITIS: Status: ACTIVE | Noted: 2019-06-18

## 2022-04-07 PROBLEM — F32.A MILD DEPRESSION: Status: ACTIVE | Noted: 2021-06-18

## 2022-04-07 PROBLEM — E11.9 TYPE 2 DIABETES MELLITUS WITHOUT COMPLICATION, WITHOUT LONG-TERM CURRENT USE OF INSULIN (CMD): Status: ACTIVE | Noted: 2021-06-18

## 2022-04-07 PROBLEM — E78.49 OTHER HYPERLIPIDEMIA: Status: ACTIVE | Noted: 2021-06-18

## 2022-04-07 PROBLEM — J34.3 HYPERTROPHY OF INFERIOR NASAL TURBINATE: Status: ACTIVE | Noted: 2019-06-18

## 2022-04-07 PROCEDURE — 99213 OFFICE O/P EST LOW 20 MIN: CPT | Performed by: NURSE PRACTITIONER

## 2022-04-07 RX ORDER — AMOXICILLIN AND CLAVULANATE POTASSIUM 875; 125 MG/1; MG/1
1 TABLET, FILM COATED ORAL EVERY 12 HOURS
Qty: 20 TABLET | Refills: 0 | Status: SHIPPED | OUTPATIENT
Start: 2022-04-07 | End: 2022-04-17

## 2022-04-07 ASSESSMENT — ENCOUNTER SYMPTOMS
SORE THROAT: 1
NUMBNESS: 0
DIZZINESS: 0
VOICE CHANGE: 0
HEADACHES: 1
TROUBLE SWALLOWING: 0
SINUS PRESSURE: 1
FATIGUE: 1
ABDOMINAL PAIN: 0
APPETITE CHANGE: 0
APNEA: 0
ACTIVITY CHANGE: 0
COUGH: 1
SINUS PAIN: 1
WHEEZING: 0
CHILLS: 0
ALLERGIC/IMMUNOLOGIC NEGATIVE: 1
FEVER: 0
LIGHT-HEADEDNESS: 0
WEAKNESS: 0
PSYCHIATRIC NEGATIVE: 1
NAUSEA: 0
GASTROINTESTINAL NEGATIVE: 1
ANOREXIA: 0
CHEST TIGHTNESS: 0
RHINORRHEA: 0
DIAPHORESIS: 0
VOMITING: 0

## 2022-05-04 ENCOUNTER — WALK IN (OUTPATIENT)
Dept: URGENT CARE | Age: 67
End: 2022-05-04

## 2022-05-04 VITALS
HEIGHT: 62 IN | OXYGEN SATURATION: 98 % | WEIGHT: 145 LBS | HEART RATE: 78 BPM | SYSTOLIC BLOOD PRESSURE: 110 MMHG | DIASTOLIC BLOOD PRESSURE: 68 MMHG | TEMPERATURE: 99.8 F | RESPIRATION RATE: 16 BRPM | BODY MASS INDEX: 26.68 KG/M2

## 2022-05-04 DIAGNOSIS — J01.00 ACUTE MAXILLARY SINUSITIS, RECURRENCE NOT SPECIFIED: Primary | ICD-10-CM

## 2022-05-04 DIAGNOSIS — J02.9 PHARYNGITIS, UNSPECIFIED ETIOLOGY: ICD-10-CM

## 2022-05-04 PROCEDURE — U0005 INFEC AGEN DETEC AMPLI PROBE: HCPCS | Performed by: CLINICAL MEDICAL LABORATORY

## 2022-05-04 PROCEDURE — U0003 INFECTIOUS AGENT DETECTION BY NUCLEIC ACID (DNA OR RNA); SEVERE ACUTE RESPIRATORY SYNDROME CORONAVIRUS 2 (SARS-COV-2) (CORONAVIRUS DISEASE [COVID-19]), AMPLIFIED PROBE TECHNIQUE, MAKING USE OF HIGH THROUGHPUT TECHNOLOGIES AS DESCRIBED BY CMS-2020-01-R: HCPCS | Performed by: CLINICAL MEDICAL LABORATORY

## 2022-05-04 PROCEDURE — 99214 OFFICE O/P EST MOD 30 MIN: CPT | Performed by: NURSE PRACTITIONER

## 2022-05-04 RX ORDER — AZELASTINE 1 MG/ML
2 SPRAY, METERED NASAL 2 TIMES DAILY PRN
Qty: 30 ML | Refills: 0 | Status: SHIPPED | OUTPATIENT
Start: 2022-05-04

## 2022-05-04 RX ORDER — AZITHROMYCIN 250 MG/1
TABLET, FILM COATED ORAL
Qty: 6 TABLET | Refills: 0 | Status: SHIPPED | OUTPATIENT
Start: 2022-05-04

## 2022-05-04 ASSESSMENT — ENCOUNTER SYMPTOMS
SORE THROAT: 1
EYES NEGATIVE: 1
WHEEZING: 0
DIARRHEA: 0
FATIGUE: 1
APPETITE CHANGE: 1
HEADACHES: 1
ABDOMINAL PAIN: 0
SINUS PRESSURE: 1
SWOLLEN GLANDS: 1
NAUSEA: 0
RESPIRATORY NEGATIVE: 1
COUGH: 0
SHORTNESS OF BREATH: 0
CHILLS: 1
SINUS PAIN: 1
GASTROINTESTINAL NEGATIVE: 1
FEVER: 1
VOMITING: 0

## 2022-05-04 ASSESSMENT — PAIN SCALES - GENERAL: PAINLEVEL: 5

## 2022-05-05 LAB
SARS-COV-2 RNA RESP QL NAA+PROBE: NOT DETECTED
SERVICE CMNT-IMP: NORMAL
SERVICE CMNT-IMP: NORMAL

## 2022-10-17 ENCOUNTER — TRANSCRIBE ORDER (OUTPATIENT)
Dept: SCHEDULING | Age: 67
End: 2022-10-17

## 2022-10-17 DIAGNOSIS — Z12.31 BREAST CANCER SCREENING BY MAMMOGRAM: Primary | ICD-10-CM

## 2022-10-27 ENCOUNTER — HOSPITAL ENCOUNTER (OUTPATIENT)
Dept: MAMMOGRAPHY | Age: 67
Discharge: HOME | End: 2022-10-27
Attending: INTERNAL MEDICINE
Payer: MEDICARE

## 2022-10-27 DIAGNOSIS — Z12.31 BREAST CANCER SCREENING BY MAMMOGRAM: ICD-10-CM

## 2022-10-27 PROCEDURE — 77063 BREAST TOMOSYNTHESIS BI: CPT

## 2022-10-28 ENCOUNTER — OFFICE VISIT (OUTPATIENT)
Dept: INTERNAL MEDICINE CLINIC | Age: 67
End: 2022-10-28
Payer: MEDICARE

## 2022-10-28 VITALS
OXYGEN SATURATION: 98 % | HEIGHT: 62 IN | HEART RATE: 88 BPM | SYSTOLIC BLOOD PRESSURE: 150 MMHG | RESPIRATION RATE: 16 BRPM | BODY MASS INDEX: 31.62 KG/M2 | DIASTOLIC BLOOD PRESSURE: 88 MMHG | WEIGHT: 171.8 LBS | TEMPERATURE: 97.1 F

## 2022-10-28 DIAGNOSIS — E83.52 HYPERCALCEMIA: ICD-10-CM

## 2022-10-28 DIAGNOSIS — E55.9 VITAMIN D DEFICIENCY: ICD-10-CM

## 2022-10-28 DIAGNOSIS — R73.01 IMPAIRED FASTING GLUCOSE: ICD-10-CM

## 2022-10-28 DIAGNOSIS — I10 PRIMARY HYPERTENSION: ICD-10-CM

## 2022-10-28 DIAGNOSIS — M80.00XS OSTEOPOROSIS WITH CURRENT PATHOLOGICAL FRACTURE, UNSPECIFIED OSTEOPOROSIS TYPE, SEQUELA: ICD-10-CM

## 2022-10-28 DIAGNOSIS — Z23 ENCOUNTER FOR IMMUNIZATION: ICD-10-CM

## 2022-10-28 DIAGNOSIS — D50.8 OTHER IRON DEFICIENCY ANEMIA: ICD-10-CM

## 2022-10-28 DIAGNOSIS — R79.89 ELEVATED TSH: ICD-10-CM

## 2022-10-28 DIAGNOSIS — D51.3 OTHER DIETARY VITAMIN B12 DEFICIENCY ANEMIA: ICD-10-CM

## 2022-10-28 DIAGNOSIS — F17.200 SMOKER: ICD-10-CM

## 2022-10-28 DIAGNOSIS — K21.9 GASTROESOPHAGEAL REFLUX DISEASE WITHOUT ESOPHAGITIS: ICD-10-CM

## 2022-10-28 DIAGNOSIS — Z00.00 ENCOUNTER FOR SUBSEQUENT ANNUAL WELLNESS VISIT (AWV) IN MEDICARE PATIENT: Primary | ICD-10-CM

## 2022-10-28 DIAGNOSIS — E03.9 ACQUIRED HYPOTHYROIDISM: ICD-10-CM

## 2022-10-28 DIAGNOSIS — E53.8 B12 DEFICIENCY: ICD-10-CM

## 2022-10-28 DIAGNOSIS — Z98.84 HISTORY OF GASTRIC BYPASS: ICD-10-CM

## 2022-10-28 PROBLEM — Z85.41 HISTORY OF CERVICAL CANCER: Status: ACTIVE | Noted: 2022-10-28

## 2022-10-28 PROBLEM — Z85.41 HISTORY OF CERVICAL CANCER: Status: RESOLVED | Noted: 2022-10-28 | Resolved: 2022-10-28

## 2022-10-28 PROCEDURE — G8754 DIAS BP LESS 90: HCPCS | Performed by: INTERNAL MEDICINE

## 2022-10-28 PROCEDURE — 99214 OFFICE O/P EST MOD 30 MIN: CPT | Performed by: INTERNAL MEDICINE

## 2022-10-28 PROCEDURE — G8510 SCR DEP NEG, NO PLAN REQD: HCPCS | Performed by: INTERNAL MEDICINE

## 2022-10-28 PROCEDURE — G8427 DOCREV CUR MEDS BY ELIG CLIN: HCPCS | Performed by: INTERNAL MEDICINE

## 2022-10-28 PROCEDURE — G8536 NO DOC ELDER MAL SCRN: HCPCS | Performed by: INTERNAL MEDICINE

## 2022-10-28 PROCEDURE — G0439 PPPS, SUBSEQ VISIT: HCPCS | Performed by: INTERNAL MEDICINE

## 2022-10-28 PROCEDURE — G8417 CALC BMI ABV UP PARAM F/U: HCPCS | Performed by: INTERNAL MEDICINE

## 2022-10-28 PROCEDURE — 1101F PT FALLS ASSESS-DOCD LE1/YR: CPT | Performed by: INTERNAL MEDICINE

## 2022-10-28 PROCEDURE — G0463 HOSPITAL OUTPT CLINIC VISIT: HCPCS | Performed by: INTERNAL MEDICINE

## 2022-10-28 PROCEDURE — G8753 SYS BP > OR = 140: HCPCS | Performed by: INTERNAL MEDICINE

## 2022-10-28 PROCEDURE — 3017F COLORECTAL CA SCREEN DOC REV: CPT | Performed by: INTERNAL MEDICINE

## 2022-10-28 PROCEDURE — G9899 SCRN MAM PERF RSLTS DOC: HCPCS | Performed by: INTERNAL MEDICINE

## 2022-10-28 PROCEDURE — 1090F PRES/ABSN URINE INCON ASSESS: CPT | Performed by: INTERNAL MEDICINE

## 2022-10-28 RX ORDER — ZOLEDRONIC ACID 5 MG/100ML
5 INJECTION, SOLUTION INTRAVENOUS ONCE
COMMUNITY

## 2022-10-28 RX ORDER — LOSARTAN POTASSIUM AND HYDROCHLOROTHIAZIDE 12.5; 5 MG/1; MG/1
1 TABLET ORAL DAILY
Qty: 30 TABLET | Refills: 1 | Status: SHIPPED | OUTPATIENT
Start: 2022-10-28 | End: 2022-10-28

## 2022-10-28 NOTE — ASSESSMENT & PLAN NOTE
Above goal with current regimen, will change to combo pill  Advised on home monitoring of BP and keep a log

## 2022-10-28 NOTE — ASSESSMENT & PLAN NOTE
2000 and revision in 2018 due to GERD  Careful with diet, eats smaller more frequent meals  Will check annual monitoring labs

## 2022-10-28 NOTE — PROGRESS NOTES
10/28/2022     Martha Valadez is here today for    Annual Wellness Visit- Subsequent Visit    Assessment & Plan:   Below is the assessment and plan developed based on review of pertinent history, physical exam, labs, studies, and medications. 1. Encounter for subsequent annual wellness visit (AWV) in Medicare patient  2. Osteoporosis with current pathological fracture, unspecified osteoporosis type, sequela  Assessment & Plan:   S/p reclast 2016, again 10/2021, scheduled for next dose 2022  DEXA due 10/2023  Orders:  -     VITAMIN D, 25 HYDROXY; Future  3. Primary hypertension  Assessment & Plan:  Above goal with current regimen, will change to combo pill  Advised on home monitoring of BP and keep a log    Orders:  -     losartan-hydroCHLOROthiazide (HYZAAR) 50-12.5 mg per tablet; Take 1 Tablet by mouth daily. , Normal, Disp-30 Tablet, R-1  -     MICROALBUMIN, UR, RAND W/ MICROALB/CREAT RATIO; Future  4. History of gastric bypass  Assessment & Plan:   2000 and revision in 2018 due to GERD  Careful with diet, eats smaller more frequent meals  Will check annual monitoring labs  Orders:  -     CBC WITH AUTOMATED DIFF; Future  -     METABOLIC PANEL, COMPREHENSIVE; Future  -     TSH 3RD GENERATION; Future  -     HEMOGLOBIN A1C WITH EAG; Future  -     IRON PROFILE; Future  -     FERRITIN; Future  -     PTH INTACT; Future  -     VITAMIN D, 25 HYDROXY; Future  -     VITAMIN B12 & FOLATE; Future  -     VITAMIN B1, WHOLE BLOOD; Future  5. Hypercalcemia  -     METABOLIC PANEL, COMPREHENSIVE; Future  -     PTH INTACT; Future  -     VITAMIN D, 25 HYDROXY; Future  6. Gastroesophageal reflux disease without esophagitis  Assessment & Plan:  Resolved post surgery, not on meds  7. B12 deficiency  8. Elevated TSH  -     TSH 3RD GENERATION; Future  9. Other iron deficiency anemia  -     CBC WITH AUTOMATED DIFF; Future  -     IRON PROFILE; Future  -     FERRITIN; Future  10.  Vitamin D deficiency  -     VITAMIN B1, WHOLE BLOOD; Future  11. Smoker  Assessment & Plan:  Working on cutting back, notes she coughs less when smoking less  Going away this weekend and hoping to jump start her cessation efforts  12. Acquired hypothyroidism   -     TSH 3RD GENERATION; Future  13. Impaired fasting glucose   -     HEMOGLOBIN A1C WITH EAG; Future  14. Other dietary vitamin B12 deficiency anemia   -     VITAMIN B12 & FOLATE; Future       Follow-up and Dispositions    Return in about 6 weeks (around 12/9/2022) for chronic follow up. Subjective: In addition to AWV, we followed up on chronic conditions as detailed above    Additional concerns: wants to quit smoking, wants to be more active    Diet and exercise habits:balanced diet, not very active    End of Life Planning: This was discussed with her today and she has an advanced directive - a copy has been provided. Reviewed DNR/DNI and patient is not interested. Depression Screen:  3 most recent PHQ Screens 10/28/2022   Little interest or pleasure in doing things Not at all   Feeling down, depressed, irritable, or hopeless Not at all   Total Score PHQ 2 0         Fall Risk:   Fall Risk Assessment, last 12 mths 10/28/2022   Able to walk? Yes   Fall in past 12 months? 0   Do you feel unsteady? 0   Are you worried about falling 0   Number of falls in past 12 months -   Fall with injury? -       Abuse Screen:  Abuse Screening Questionnaire 10/24/2022   Do you ever feel afraid of your partner? N   Are you in a relationship with someone who physically or mentally threatens you? N   Is it safe for you to go home?  Y       Do you average more than 1 drink per night or more than 7 drinks a week:  No    On any one occasion in the past three months have you have had more than 3 drinks containing alcohol:  No            Functional Ability and Level of Safety:   Hearing:  Hearing: (P) additional comments below  Hearing comments: (P) Some hearing lose   Cognition Screen:  Has your family/caregiver stated any concerns about your memory?: (P) No  Cognitive Screening: Normal - Clock Drawing Test    Ambulation:  Patient ambulates: (P) with no difficulty  Activities of Daily Living: The home contains: (P) grab bars  Functional ADLs: (P) Patient does total self care      Adult Nutrition Screen:  No risk factors noted. Health Maintenance:     Health Maintenance   Topic Date Due    Shingrix Vaccine Age 49> (1 of 2) Never done    COVID-19 Vaccine (3 - Booster for Moderna series) 07/13/2021    Flu Vaccine (1) 08/01/2022    Colorectal Cancer Screening Combo  03/20/2023    Depression Screen  10/24/2023    Medicare Yearly Exam  10/29/2023    Breast Cancer Screen Mammogram  10/27/2024    DTaP/Tdap/Td series (2 - Td or Tdap) 04/07/2025    Lipid Screen  10/08/2026    Hepatitis C Screening  Completed    Bone Densitometry (Dexa) Screening  Completed    Pneumococcal 65+ years  Completed       Immunization History   Administered Date(s) Administered    COVID-19, MODERNA BLUE border, Primary or Immunocompromised, (age 18y+), IM, 100 mcg/0.5mL 04/15/2021, 05/18/2021    Influenza Vaccine 10/01/2015, 11/13/2018    Influenza Vaccine Split 11/09/2012    Influenza, FLUAD, (age 72 y+), Adjuvanted 10/04/2021    Influenza, FLUCELVAX, (age 10 mo+), MDCK, PF 09/30/2018, 10/19/2019    Pneumococcal Polysaccharide (PPSV-23) 06/25/2020    Tdap 04/07/2015    Zoster Vaccine, Live 04/23/2016        Bone Density: up to date    Immunizations:   Covid: not up to date - booster  Influenza: will get this done today  Tetanus: up to date  Shingles: due for Shingrix   Pneumonia: up to date  Cancer screening:   Cervical: reviewed guidelines, n/a - s/p hysterectomy  Breast: reviewed guidelines, up to date  Colon: reviewed guidelines, up to date       Objective:   Physical Exam  Constitutional:       Appearance: Normal appearance. She is not ill-appearing. HENT:      Head: Normocephalic and atraumatic.       Right Ear: Tympanic membrane, ear canal and external ear normal. There is no impacted cerumen. Left Ear: Tympanic membrane, ear canal and external ear normal. There is no impacted cerumen. Nose: Nose normal. No congestion. Mouth/Throat:      Mouth: Mucous membranes are moist.      Pharynx: Oropharynx is clear. No oropharyngeal exudate. Eyes:      Conjunctiva/sclera: Conjunctivae normal.      Pupils: Pupils are equal, round, and reactive to light. Cardiovascular:      Rate and Rhythm: Normal rate and regular rhythm. Heart sounds: No murmur heard. Pulmonary:      Effort: Pulmonary effort is normal. No respiratory distress. Breath sounds: Normal breath sounds. No wheezing. Musculoskeletal:      Cervical back: Normal range of motion and neck supple. Right lower leg: No edema. Left lower leg: No edema. Lymphadenopathy:      Cervical: No cervical adenopathy. Skin:     General: Skin is warm. Neurological:      General: No focal deficit present. Mental Status: She is alert and oriented to person, place, and time. Mental status is at baseline. Gait: Gait normal.   Psychiatric:         Mood and Affect: Mood normal.         Thought Content: Thought content normal.         Judgment: Judgment normal.        Vitals:    10/28/22 0841   BP: (!) 150/88   Pulse: 88   Resp: 16   Temp: 97.1 °F (36.2 °C)   TempSrc: Temporal   SpO2: 98%   Weight: 171 lb 12.8 oz (77.9 kg)   Height: 5' 2\" (1.575 m)       Patient Care Team:  Srinivas Benitez MD as PCP - General (Internal Medicine Physician)  Landry Madison MD as PCP [facility]  Brian Deleon MD as Consulting Provider (Endocrinology Physician)  Jillian Frausto MD as Surgeon (General Surgery)  Christoph Taylor MD as Surgeon (General Surgery)        Review of Systems   Constitutional:  Negative for chills and fever. Respiratory:  Positive for cough (chronic). Negative for shortness of breath.     Cardiovascular:  Negative for chest pain and palpitations. Gastrointestinal:  Negative for abdominal pain, blood in stool, constipation, diarrhea, heartburn, nausea and vomiting. Musculoskeletal:  Positive for joint pain (R ankle). Negative for myalgias. Neurological:  Negative for tingling, focal weakness and headaches. Endo/Heme/Allergies:  Does not bruise/bleed easily. Psychiatric/Behavioral:  Negative for depression. The patient is not nervous/anxious and does not have insomnia. The following sections were reviewed & updated as appropriate: Problem List, Allergies, PMH, PSH, FH, and SH. Patient Active Problem List   Diagnosis Code    Insomnia, unspecified G47.00    Pernicious anemia D51.0    Vitamin D deficiency E55.9    Reflux esophagitis K21.00    Osteoporosis M81.0    Iron deficiency anemia, unspecified D50.9    Onychomycosis B35.1    Active advance directive on file Z78.9    Hypercalcemia E83.52    H/O hyperparathyroidism Z86.39    B12 deficiency E53.8    Gastroesophageal reflux disease without esophagitis K21.9    Diverticulosis K57.90    Elevated TSH R79.89    History of gastric bypass Z98.84    Smoker F17.200    Primary hypertension I10       Codeine    Social History     Occupational History    Occupation: computer    Tobacco Use    Smoking status: Every Day     Packs/day: 0.50     Years: 7.00     Pack years: 3.50     Types: Cigarettes     Last attempt to quit: 3/18/2018     Years since quittin.6    Smokeless tobacco: Never   Substance and Sexual Activity    Alcohol use:  Yes     Alcohol/week: 4.0 standard drinks     Types: 1 Glasses of wine, 3 Cans of beer per week    Drug use: No    Sexual activity: Not on file        Current Outpatient Medications   Medication Instructions    cholecalciferol (VITAMIN D3) 2,000 Units, Oral, DAILY    diphenhydrAMINE (BENADRYL) 25 mg, Oral, BEDTIME PRN    losartan-hydroCHLOROthiazide (HYZAAR) 50-12.5 mg per tablet 1 Tablet, Oral, DAILY    zoledronic acid (RECLAST) 5 mg/100 mL pgbk 5 mg, IntraVENous, ONCE       Disclaimer:  Aspects of this note may have been generated using Dragon voice recognition software. Despite editing, there may be some syntax errors   We discussed the expected course, resolution and complications of the diagnosis(es) in detail. I have discussed any significant medication side effects and warnings with the patient when indicated. I advised them to contact the office if their condition worsens, changes or fails to improve as anticipated. Patient expressed understanding of the diagnosis and plan. An electronic signature was used to authenticate this note.   -- Sydni Ramirez MD

## 2022-10-28 NOTE — ASSESSMENT & PLAN NOTE
Working on cutting back, notes she coughs less when smoking less  Going away this weekend and hoping to jump start her cessation efforts

## 2022-10-28 NOTE — PROGRESS NOTES
Myrna Laboy  is a 79 y. o.female  who present for routine immunizations. Prior to vaccine administration: Consent was obtained. Risks and adverse reactions were discussed. The patient was provided the VIS and they were given an opportunity to ask questions; all questions were addressed. She  denies any symptoms, reactions or allergies that would exclude them from being immunized today. There were no adverse reactions observed post vaccination. Patient was advised to seek medical or call the office with any questions or concerns post vaccination. Patient verbalized understanding.    Martha Doe LPN

## 2022-10-28 NOTE — PATIENT INSTRUCTIONS
Medicare Wellness Visit, Female     The best way to live healthy is to have a lifestyle where you eat a well-balanced diet, exercise regularly, limit alcohol use, and quit all forms of tobacco/nicotine, if applicable. Regular preventive services are another way to keep healthy. Preventive services (vaccines, screening tests, monitoring & exams) can help personalize your care plan, which helps you manage your own care. Screening tests can find health problems at the earliest stages, when they are easiest to treat. [facility] follows the current, evidence-based guidelines published by the [facility] ([facility]) when recommending preventive services for our patients. Because we follow these guidelines, sometimes recommendations change over time as research supports it. (For example, mammograms used to be recommended annually. Even though Medicare will still pay for an annual mammogram, the newer guidelines recommend a mammogram every two years for women of average risk). Of course, you and your doctor may decide to screen more often for some diseases, based on your risk and your co-morbidities (chronic disease you are already diagnosed with). Preventive services for you include:  - Medicare offers their members a free annual wellness visit, which is time for you and your primary care provider to discuss and plan for your preventive service needs. Take advantage of this benefit every year!  -All adults over the age of 72 should receive the recommended pneumonia vaccines. Current USPSTF guidelines recommend a series of two vaccines for the best pneumonia protection.   -All adults should have a flu vaccine yearly and a tetanus vaccine every 10 years.   -All adults age 48 and older should receive the shingles vaccines (series of two vaccines).       -All adults age 38-68 who are overweight should have a diabetes screening test once every three years.   -All adults born between [birth year] and 1965 should be screened once for Hepatitis C.  -Other screening tests and preventive services for persons with diabetes include: an eye exam to screen for diabetic retinopathy, a kidney function test, a foot exam, and stricter control over your cholesterol.   -Cardiovascular screening for adults with routine risk involves an electrocardiogram (ECG) at intervals determined by your doctor.   -Colorectal cancer screenings should be done for adults age 54-65 with no increased risk factors for colorectal cancer. There are a number of acceptable methods of screening for this type of cancer. Each test has its own benefits and drawbacks. Discuss with your doctor what is most appropriate for you during your annual wellness visit. The different tests include: colonoscopy (considered the best screening method), a fecal occult blood test, a fecal DNA test, and sigmoidoscopy.    -A bone mass density test is recommended when a woman turns 65 to screen for osteoporosis. This test is only recommended one time, as a screening. Some providers will use this same test as a disease monitoring tool if you already have osteoporosis. -Breast cancer screenings are recommended every other year for women of normal risk, age 54-69.  -Cervical cancer screenings for women over age 72 are only recommended with certain risk factors.      Here is a list of your current Health Maintenance items (your personalized list of preventive services) with a due date:  Health Maintenance Due   Topic Date Due    Shingles Vaccine (1 of 2) Never done    COVID-19 Vaccine (3 - Booster for Claudeen Fermo series) 07/13/2021    Yearly Flu Vaccine (1) 08/01/2022

## 2022-10-29 LAB
25(OH)D3 SERPL-MCNC: 33.3 NG/ML (ref 30–100)
ALBUMIN SERPL-MCNC: 4 G/DL (ref 3.5–5)
ALBUMIN/GLOB SERPL: 1.3 {RATIO} (ref 1.1–2.2)
ALP SERPL-CCNC: 50 U/L (ref 45–117)
ALT SERPL-CCNC: 22 U/L (ref 12–78)
ANION GAP SERPL CALC-SCNC: 5 MMOL/L (ref 5–15)
AST SERPL-CCNC: 16 U/L (ref 15–37)
BASOPHILS # BLD: 0.1 K/UL (ref 0–0.1)
BASOPHILS NFR BLD: 1 % (ref 0–1)
BILIRUB SERPL-MCNC: 0.4 MG/DL (ref 0.2–1)
BUN SERPL-MCNC: 11 MG/DL (ref 6–20)
BUN/CREAT SERPL: 22 (ref 12–20)
CALCIUM SERPL-MCNC: 9.3 MG/DL (ref 8.5–10.1)
CALCIUM SERPL-MCNC: 9.6 MG/DL (ref 8.5–10.1)
CHLORIDE SERPL-SCNC: 105 MMOL/L (ref 97–108)
CO2 SERPL-SCNC: 30 MMOL/L (ref 21–32)
CREAT SERPL-MCNC: 0.5 MG/DL (ref 0.55–1.02)
CREAT UR-MCNC: 40.7 MG/DL
DIFFERENTIAL METHOD BLD: ABNORMAL
EOSINOPHIL # BLD: 0.1 K/UL (ref 0–0.4)
EOSINOPHIL NFR BLD: 1 % (ref 0–7)
ERYTHROCYTE [DISTWIDTH] IN BLOOD BY AUTOMATED COUNT: 18.6 % (ref 11.5–14.5)
EST. AVERAGE GLUCOSE BLD GHB EST-MCNC: 111 MG/DL
FERRITIN SERPL-MCNC: 4 NG/ML (ref 8–252)
FOLATE SERPL-MCNC: 18.7 NG/ML (ref 5–21)
GLOBULIN SER CALC-MCNC: 3 G/DL (ref 2–4)
GLUCOSE SERPL-MCNC: 90 MG/DL (ref 65–100)
HBA1C MFR BLD: 5.5 % (ref 4–5.6)
HCT VFR BLD AUTO: 37.5 % (ref 35–47)
HGB BLD-MCNC: 10.6 G/DL (ref 11.5–16)
IMM GRANULOCYTES # BLD AUTO: 0 K/UL (ref 0–0.04)
IMM GRANULOCYTES NFR BLD AUTO: 0 % (ref 0–0.5)
IRON SATN MFR SERPL: 5 % (ref 20–50)
IRON SERPL-MCNC: 28 UG/DL (ref 35–150)
LYMPHOCYTES # BLD: 0.9 K/UL (ref 0.8–3.5)
LYMPHOCYTES NFR BLD: 15 % (ref 12–49)
MCH RBC QN AUTO: 22.1 PG (ref 26–34)
MCHC RBC AUTO-ENTMCNC: 28.3 G/DL (ref 30–36.5)
MCV RBC AUTO: 78.3 FL (ref 80–99)
MICROALBUMIN UR-MCNC: 0.87 MG/DL
MICROALBUMIN/CREAT UR-RTO: 21 MG/G (ref 0–30)
MONOCYTES # BLD: 0.4 K/UL (ref 0–1)
MONOCYTES NFR BLD: 7 % (ref 5–13)
NEUTS SEG # BLD: 4.8 K/UL (ref 1.8–8)
NEUTS SEG NFR BLD: 76 % (ref 32–75)
NRBC # BLD: 0 K/UL (ref 0–0.01)
NRBC BLD-RTO: 0 PER 100 WBC
PLATELET # BLD AUTO: 388 K/UL (ref 150–400)
PMV BLD AUTO: 12.3 FL (ref 8.9–12.9)
POTASSIUM SERPL-SCNC: 4.2 MMOL/L (ref 3.5–5.1)
PROT SERPL-MCNC: 7 G/DL (ref 6.4–8.2)
PTH-INTACT SERPL-MCNC: 63.8 PG/ML (ref 18.4–88)
RBC # BLD AUTO: 4.79 M/UL (ref 3.8–5.2)
RBC MORPH BLD: ABNORMAL
SODIUM SERPL-SCNC: 140 MMOL/L (ref 136–145)
TIBC SERPL-MCNC: 586 UG/DL (ref 250–450)
TSH SERPL DL<=0.05 MIU/L-ACNC: 2.49 UIU/ML (ref 0.36–3.74)
VIT B12 SERPL-MCNC: 159 PG/ML (ref 193–986)
WBC # BLD AUTO: 6.3 K/UL (ref 3.6–11)

## 2022-11-03 LAB — VIT B1 BLD-SCNC: 129.3 NMOL/L (ref 66.5–200)

## 2022-11-04 DIAGNOSIS — M80.00XS OSTEOPOROSIS WITH CURRENT PATHOLOGICAL FRACTURE, UNSPECIFIED OSTEOPOROSIS TYPE, SEQUELA: Primary | ICD-10-CM

## 2022-11-04 RX ORDER — SODIUM CHLORIDE 9 MG/ML
5-250 INJECTION, SOLUTION INTRAVENOUS AS NEEDED
Status: CANCELLED | OUTPATIENT
Start: 2022-11-07

## 2022-11-04 RX ORDER — ACETAMINOPHEN 325 MG/1
650 TABLET ORAL AS NEEDED
Status: CANCELLED
Start: 2022-11-07

## 2022-11-04 RX ORDER — EPINEPHRINE 1 MG/ML
0.3 INJECTION, SOLUTION, CONCENTRATE INTRAVENOUS AS NEEDED
Status: CANCELLED | OUTPATIENT
Start: 2022-11-07

## 2022-11-04 RX ORDER — ONDANSETRON 2 MG/ML
8 INJECTION INTRAMUSCULAR; INTRAVENOUS AS NEEDED
Status: CANCELLED | OUTPATIENT
Start: 2022-11-07

## 2022-11-04 RX ORDER — ALBUTEROL SULFATE 0.83 MG/ML
2.5 SOLUTION RESPIRATORY (INHALATION) AS NEEDED
Status: CANCELLED
Start: 2022-11-07

## 2022-11-04 RX ORDER — SODIUM CHLORIDE 9 MG/ML
5-40 INJECTION INTRAMUSCULAR; INTRAVENOUS; SUBCUTANEOUS AS NEEDED
Status: CANCELLED | OUTPATIENT
Start: 2022-11-07

## 2022-11-04 RX ORDER — ZOLEDRONIC ACID 5 MG/100ML
5 INJECTION, SOLUTION INTRAVENOUS ONCE
Status: CANCELLED | OUTPATIENT
Start: 2022-11-07 | End: 2022-11-07

## 2022-11-04 RX ORDER — HEPARIN 100 UNIT/ML
500 SYRINGE INTRAVENOUS AS NEEDED
Status: CANCELLED
Start: 2022-11-07

## 2022-11-04 RX ORDER — HYDROCORTISONE SODIUM SUCCINATE 100 MG/2ML
100 INJECTION, POWDER, FOR SOLUTION INTRAMUSCULAR; INTRAVENOUS AS NEEDED
Status: CANCELLED | OUTPATIENT
Start: 2022-11-07

## 2022-11-04 RX ORDER — SODIUM CHLORIDE 0.9 % (FLUSH) 0.9 %
5-40 SYRINGE (ML) INJECTION AS NEEDED
Status: CANCELLED | OUTPATIENT
Start: 2022-11-07

## 2022-11-04 RX ORDER — DIPHENHYDRAMINE HYDROCHLORIDE 50 MG/ML
25 INJECTION, SOLUTION INTRAMUSCULAR; INTRAVENOUS AS NEEDED
Status: CANCELLED
Start: 2022-11-07

## 2022-11-04 RX ORDER — DIPHENHYDRAMINE HYDROCHLORIDE 50 MG/ML
50 INJECTION, SOLUTION INTRAMUSCULAR; INTRAVENOUS AS NEEDED
Status: CANCELLED
Start: 2022-11-07

## 2022-11-10 ENCOUNTER — HOSPITAL ENCOUNTER (OUTPATIENT)
Dept: INFUSION THERAPY | Age: 67
Discharge: HOME | End: 2022-11-10
Payer: MEDICARE

## 2022-11-10 VITALS
TEMPERATURE: 97.9 F | BODY MASS INDEX: 32.94 KG/M2 | SYSTOLIC BLOOD PRESSURE: 135 MMHG | DIASTOLIC BLOOD PRESSURE: 69 MMHG | HEART RATE: 68 BPM | WEIGHT: 180.12 LBS | RESPIRATION RATE: 16 BRPM

## 2022-11-10 DIAGNOSIS — M80.00XS OSTEOPOROSIS WITH CURRENT PATHOLOGICAL FRACTURE, UNSPECIFIED OSTEOPOROSIS TYPE, SEQUELA: Primary | ICD-10-CM

## 2022-11-10 PROCEDURE — 96372 THER/PROPH/DIAG INJ SC/IM: CPT

## 2022-11-10 PROCEDURE — 74011000250 HC RX REV CODE- 250: Performed by: INTERNAL MEDICINE

## 2022-11-10 PROCEDURE — 74011250636 HC RX REV CODE- 250/636: Performed by: INTERNAL MEDICINE

## 2022-11-10 RX ORDER — ZOLEDRONIC ACID 5 MG/100ML
5 INJECTION, SOLUTION INTRAVENOUS ONCE
Status: COMPLETED | OUTPATIENT
Start: 2022-11-10 | End: 2022-11-10

## 2022-11-10 RX ORDER — ALBUTEROL SULFATE 0.83 MG/ML
2.5 SOLUTION RESPIRATORY (INHALATION) AS NEEDED
Start: 2023-11-05

## 2022-11-10 RX ORDER — SODIUM CHLORIDE 0.9 % (FLUSH) 0.9 %
5-40 SYRINGE (ML) INJECTION AS NEEDED
OUTPATIENT
Start: 2023-11-05

## 2022-11-10 RX ORDER — DIPHENHYDRAMINE HYDROCHLORIDE 50 MG/ML
25 INJECTION, SOLUTION INTRAMUSCULAR; INTRAVENOUS AS NEEDED
Start: 2023-11-05

## 2022-11-10 RX ORDER — SODIUM CHLORIDE 9 MG/ML
5-40 INJECTION INTRAMUSCULAR; INTRAVENOUS; SUBCUTANEOUS AS NEEDED
OUTPATIENT
Start: 2023-11-05

## 2022-11-10 RX ORDER — ONDANSETRON 2 MG/ML
8 INJECTION INTRAMUSCULAR; INTRAVENOUS AS NEEDED
OUTPATIENT
Start: 2023-11-05

## 2022-11-10 RX ORDER — SODIUM CHLORIDE 9 MG/ML
10 INJECTION INTRAMUSCULAR; INTRAVENOUS; SUBCUTANEOUS AS NEEDED
Status: DISCONTINUED | OUTPATIENT
Start: 2022-11-10 | End: 2022-11-11 | Stop reason: HOSPADM

## 2022-11-10 RX ORDER — ACETAMINOPHEN 325 MG/1
650 TABLET ORAL AS NEEDED
Start: 2023-11-05

## 2022-11-10 RX ORDER — HYDROCORTISONE SODIUM SUCCINATE 100 MG/2ML
100 INJECTION, POWDER, FOR SOLUTION INTRAMUSCULAR; INTRAVENOUS AS NEEDED
OUTPATIENT
Start: 2023-11-05

## 2022-11-10 RX ORDER — SODIUM CHLORIDE 9 MG/ML
5-250 INJECTION, SOLUTION INTRAVENOUS AS NEEDED
OUTPATIENT
Start: 2023-11-05

## 2022-11-10 RX ORDER — SODIUM CHLORIDE 9 MG/ML
5-250 INJECTION, SOLUTION INTRAVENOUS AS NEEDED
Status: DISPENSED | OUTPATIENT
Start: 2022-11-10 | End: 2022-11-10

## 2022-11-10 RX ORDER — EPINEPHRINE 1 MG/ML
0.3 INJECTION, SOLUTION, CONCENTRATE INTRAVENOUS AS NEEDED
OUTPATIENT
Start: 2023-11-05

## 2022-11-10 RX ORDER — ZOLEDRONIC ACID 5 MG/100ML
5 INJECTION, SOLUTION INTRAVENOUS ONCE
OUTPATIENT
Start: 2023-11-05 | End: 2023-11-05

## 2022-11-10 RX ORDER — DIPHENHYDRAMINE HYDROCHLORIDE 50 MG/ML
50 INJECTION, SOLUTION INTRAMUSCULAR; INTRAVENOUS AS NEEDED
Start: 2023-11-05

## 2022-11-10 RX ORDER — HEPARIN 100 UNIT/ML
500 SYRINGE INTRAVENOUS AS NEEDED
Start: 2023-11-05

## 2022-11-10 RX ADMIN — SODIUM CHLORIDE, PRESERVATIVE FREE 10 ML: 5 INJECTION INTRAVENOUS at 09:08

## 2022-11-10 RX ADMIN — ZOLEDRONIC ACID 5 MG: 0.05 INJECTION, SOLUTION INTRAVENOUS at 09:18

## 2022-11-10 RX ADMIN — SODIUM CHLORIDE 25 ML/HR: 9 INJECTION, SOLUTION INTRAVENOUS at 09:15

## 2022-11-10 NOTE — PROGRESS NOTES
[facility] Peds/Adult Note                       Date: November 10, 2022    Name: Yusra Tirado    MRN: [id number]         : 1955    0900 Patient arrives for Reclast without acute problems. Please see [facility] for complete assessment and education provided. Vital signs stable throughout and prior to discharge. Patient tolerated procedure well and was discharged without incident. Patient is aware of no further [facility] appointments and to follow up with referring provider for any question or concerns. Ms. Johana Kendall vitals were reviewed prior to and after treatment. Patient Vitals for the past 12 hrs:   Temp Pulse Resp BP   11/10/22 0937 -- 68 16 135/69   11/10/22 0901 97.9 °F (36.6 °C) 72 16 137/74         Lab results were reviewed from 10/28/2022   Latest Reference Range & Units 10/28/22 12:58   Sodium 136 - 145 mmol/L 140   Potassium 3.5 - 5.1 mmol/L 4.2   Chloride 97 - 108 mmol/L 105   CO2 21 - 32 mmol/L 30   Anion gap 5 - 15 mmol/L 5   Glucose 65 - 100 mg/dL 90   BUN 6 - 20 MG/DL 11   Creatinine 0.55 - 1.02 MG/DL 0.50 (L)   BUN/Creatinine ratio 12 - 20   22 (H)   Calcium 8.5 - 10.1 MG/DL  8.5 - 10.1 MG/DL 9.3  9.6   eGFR >60 ml/min/1.73m2 >60   Bilirubin, total 0.2 - 1.0 MG/DL 0.4   Protein, total 6.4 - 8.2 g/dL 7.0   Albumin 3.5 - 5.0 g/dL 4.0   Globulin 2.0 - 4.0 g/dL 3.0   A-G Ratio 1.1 - 2.2   1.3   ALT 12 - 78 U/L 22   AST 15 - 37 U/L 16   Alk.  phosphatase 45 - 117 U/L 50   Vitamin B12 193 - 986 pg/mL 159 (L)   Folate 5.0 - 21.0 ng/mL 18.7   Hemoglobin A1c, (calculated) 4.0 - 5.6 % 5.5   Est. average glucose mg/dL 111   Iron 35 - 150 ug/dL 28 (L)   TIBC 250 - 450 ug/dL 586 (H)   Iron % saturation 20 - 50 % 5 (L)   Ferritin 8 - 252 NG/ML 4 (L)   (L): Data is abnormally low  (H): Data is abnormally high      Medications given:   Medications Administered       0.9% sodium chloride infusion       Admin Date  11/10/2022 Action  New Bag Dose  25 mL/hr Rate  25 mL/hr Route  IntraVENous Administered By  Caridad Meraz RN              0.9% sodium chloride injection 10 mL       Admin Date  11/10/2022 Action  Given Dose  10 mL Route  IntraVENous Administered By  Caridad Meraz RN              zoledronic acid (RECLAST) IVPB 5 mg       Admin Date  11/10/2022 Action  New Bag Dose  5 mg Rate  400 mL/hr Route  IntraVENous Administered By  Caridad Meraz RN                    Ms. Rad Davis tolerated the infusion, and had no complaints. Ms. Rad Davis was discharged from [place] [address] in stable condition. Discharge Instructions provided to patient, patient verbalized understanding but denied the request for a copy of d/c instructions.      Future Appointments   Date Time Provider Deyanira Estrada   1/6/2023 11:00 AM Manju Rayo MD [facility]   11/10/2023  9:00 AM FLORENTIN DEE [facility]       Melanie Wu RN  November 10, 2022  11:16 AM

## 2023-01-06 ENCOUNTER — OFFICE VISIT (OUTPATIENT)
Dept: INTERNAL MEDICINE CLINIC | Age: 68
End: 2023-01-06
Payer: MEDICARE

## 2023-01-06 VITALS
OXYGEN SATURATION: 100 % | HEART RATE: 71 BPM | RESPIRATION RATE: 14 BRPM | SYSTOLIC BLOOD PRESSURE: 128 MMHG | DIASTOLIC BLOOD PRESSURE: 70 MMHG | TEMPERATURE: 97.8 F | BODY MASS INDEX: 32.2 KG/M2 | HEIGHT: 62 IN | WEIGHT: 175 LBS

## 2023-01-06 DIAGNOSIS — M80.00XS OSTEOPOROSIS WITH CURRENT PATHOLOGICAL FRACTURE, UNSPECIFIED OSTEOPOROSIS TYPE, SEQUELA: ICD-10-CM

## 2023-01-06 DIAGNOSIS — F17.200 SMOKER: ICD-10-CM

## 2023-01-06 DIAGNOSIS — D50.8 OTHER IRON DEFICIENCY ANEMIA: Primary | ICD-10-CM

## 2023-01-06 DIAGNOSIS — I10 PRIMARY HYPERTENSION: ICD-10-CM

## 2023-01-06 DIAGNOSIS — E53.8 B12 DEFICIENCY: ICD-10-CM

## 2023-01-06 DIAGNOSIS — Z98.84 HISTORY OF GASTRIC BYPASS: ICD-10-CM

## 2023-01-06 DIAGNOSIS — J06.9 VIRAL UPPER RESPIRATORY TRACT INFECTION: ICD-10-CM

## 2023-01-06 PROBLEM — E83.52 HYPERCALCEMIA: Status: RESOLVED | Noted: 2017-02-21 | Resolved: 2023-01-06

## 2023-01-06 RX ORDER — ACETAMINOPHEN, DIPHENHYDRAMINE HCL, PHENYLEPHRINE HCL 325; 25; 5 MG/1; MG/1; MG/1
TABLET ORAL
COMMUNITY

## 2023-01-06 RX ORDER — LOSARTAN POTASSIUM AND HYDROCHLOROTHIAZIDE 12.5; 5 MG/1; MG/1
1 TABLET ORAL DAILY
Qty: 90 TABLET | Refills: 3 | Status: SHIPPED | OUTPATIENT
Start: 2023-01-06

## 2023-01-06 NOTE — PROGRESS NOTES
1/6/2023    Letty Cisneros 1955 is a 79y.o. year old female established patient,   here for evaluation of the following chief complaint(s):  Chief Complaint   Patient presents with    Follow Up Chronic Condition    Anemia           ASSESSMENT/PLAN:  Below is the assessment and plan developed based on review of pertinent history, physical exam, labs, studies, and medications. 1. Other iron deficiency anemia  Assessment & Plan:   H/o gastric bypass  May not be able to absorb orally as well, will reassess today  Suggested she check in with GI soon as she will be due for colonoscopy anyway and I would favor a concurrent EGD  Orders:  -     CBC WITH AUTOMATED DIFF; Future  -     FERRITIN; Future  -     IRON PROFILE; Future  2. Primary hypertension  Assessment & Plan: With white coat HTN, home readings now at goal  Continue current regimen  Orders:  -     losartan-hydroCHLOROthiazide (HYZAAR) 50-12.5 mg per tablet; Take 1 Tablet by mouth daily. , Normal, Disp-90 Tablet, R-3**Patient requests 90 days supply**  -     METABOLIC PANEL, BASIC; Future  3. Osteoporosis with current pathological fracture, unspecified osteoporosis type, sequela  Assessment & Plan:   S/p reclast 2016, again 10/2021, 11/2022  DEXA due 10/2023  4. B12 deficiency  -     VITAMIN B12; Future  5. History of gastric bypass  Assessment & Plan:   2000 and revision in 2018 due to GERD  Careful with diet, eats smaller more frequent meals    6. Smoker  Assessment & Plan:  Has decreased some, cessation encouraged  7. Viral upper respiratory tract infection  Suspected viral illness, suggest adding saline rinse, fluticasone nasal, zyrtec  Follow up with me next week if no improvement and would consider adding antibiotics    Follow-up and Dispositions    Return in about 6 months (around 7/6/2023) for chronic follow up. SUBJECTIVE/OBJECTIVE:  Follow up from last visit, routine labs revealed iron deficiency and low b12 with anemia.   She has added oral iron and b12  No difference in how she feels in general, does still have some fatigue    BP at home has been at goal since adding diuretic along with losartan    This past week she has been having a lot of sinus and nasal congestion, head feels stuffy, lots of post nasal drip. Has tried mucinex that helps a bit. Not short of breath, no fevers. Review of Systems   Constitutional:  Positive for malaise/fatigue. Negative for chills and fever. HENT:  Positive for congestion and sinus pain. Ear pain: feeling full. Respiratory:  Negative for cough and shortness of breath. Cardiovascular:  Negative for chest pain, palpitations and leg swelling. Gastrointestinal:  Positive for heartburn (occasional). Negative for abdominal pain. Musculoskeletal:  Negative for myalgias. Skin:  Negative for rash. Psychiatric/Behavioral:  Negative for depression. Physical Exam  Constitutional:       General: She is not in acute distress. HENT:      Head: Normocephalic and atraumatic. Right Ear: Tympanic membrane, ear canal and external ear normal. No middle ear effusion. There is no impacted cerumen. Tympanic membrane is not erythematous or bulging. Left Ear: Tympanic membrane, ear canal and external ear normal.  No middle ear effusion. There is no impacted cerumen. Tympanic membrane is not erythematous or bulging. Nose: Congestion present. No mucosal edema or rhinorrhea. Right Sinus: No maxillary sinus tenderness or frontal sinus tenderness. Left Sinus: No maxillary sinus tenderness or frontal sinus tenderness. Mouth/Throat:      Pharynx: Uvula midline. No oropharyngeal exudate or posterior oropharyngeal erythema. Eyes:      General: No scleral icterus. Conjunctiva/sclera: Conjunctivae normal.   Cardiovascular:      Rate and Rhythm: Regular rhythm. Heart sounds: Normal heart sounds. No murmur heard.   Pulmonary:      Effort: Pulmonary effort is normal. Breath sounds: Normal breath sounds. No wheezing or rales. Musculoskeletal:      Cervical back: Neck supple. Lymphadenopathy:      Cervical: No cervical adenopathy. Skin:     Findings: No rash. Neurological:      General: No focal deficit present. Mental Status: She is oriented to person, place, and time. Psychiatric:         Mood and Affect: Mood normal.        Vitals:    01/06/23 1100 01/06/23 1125   BP: (!) 144/84 128/70   Pulse: 71    Resp: 14    Temp: 97.8 °F (36.6 °C)    TempSrc: Temporal    SpO2: 100%    Weight: 175 lb (79.4 kg)    Height: 5' 2\" (1.575 m)         The following sections were reviewed & updated as appropriate: Problem List, Allergies, PMH, PSH, FH, and SH. Patient Active Problem List   Diagnosis Code    Insomnia, unspecified G47.00    Pernicious anemia D51.0    Vitamin D deficiency E55.9    Reflux esophagitis K21.00    Osteoporosis M81.0    Iron deficiency anemia, unspecified D50.9    Onychomycosis B35.1    Active advance directive on file Z78.9    H/O hyperparathyroidism Z86.39    B12 deficiency E53.8    Gastroesophageal reflux disease without esophagitis K21.9    Diverticulosis K57.90    Elevated TSH R79.89    History of gastric bypass Z98.84    Smoker F17.200    Primary hypertension I10        Current Outpatient Medications   Medication Sig Dispense Refill    cyanocobalamin, vitamin B-12, (Vitamin B-12) 5,000 mcg subl by SubLINGual route. iron bis-glycinat/vit C/FA/B12 (GENTLE IRON PO) Take  by mouth.      losartan-hydroCHLOROthiazide (HYZAAR) 50-12.5 mg per tablet Take 1 Tablet by mouth daily. 90 Tablet 3    zoledronic acid (RECLAST) 5 mg/100 mL pgbk 5 mg by IntraVENous route once. cholecalciferol (VITAMIN D3) (1000 Units /25 mcg) tablet Take 2 Tablets by mouth daily. (Patient taking differently: Take 5,000 Units by mouth daily.) 60 Tablet 5    diphenhydrAMINE (BENADRYL) 25 mg capsule Take 1 Cap by mouth nightly as needed for Sleep.  30 Cap 11 Cesia    Social History     Occupational History    Occupation: computer    Tobacco Use    Smoking status: Every Day     Packs/day: 0.50     Years: 7.00     Pack years: 3.50     Types: Cigarettes     Last attempt to quit: 3/18/2018     Years since quittin.8    Smokeless tobacco: Never   Vaping Use    Vaping Use: Never used   Substance and Sexual Activity    Alcohol use: Yes     Alcohol/week: 4.0 standard drinks     Types: 1 Glasses of wine, 3 Cans of beer per week    Drug use: No    Sexual activity: Not on file            Disclaimer:  Aspects of this note may have been generated using Dragon voice recognition software. Despite editing, there may be some syntax errors   We discussed the expected course, resolution and complications of the diagnosis(es) in detail. I have discussed any significant medication side effects and warnings with the patient when indicated. I advised them to contact the office if their condition worsens, changes or fails to improve as anticipated. Patient expressed understanding of the diagnosis and plan. An electronic signature was used to authenticate this note.   -- Amina Weiner MD

## 2023-01-06 NOTE — ASSESSMENT & PLAN NOTE
H/o gastric bypass  May not be able to absorb orally as well, will reassess today  Suggested she check in with GI soon as she will be due for colonoscopy anyway and I would favor a concurrent EGD

## 2023-01-26 DIAGNOSIS — I10 PRIMARY HYPERTENSION: ICD-10-CM

## 2023-01-26 RX ORDER — LOSARTAN POTASSIUM AND HYDROCHLOROTHIAZIDE 12.5; 5 MG/1; MG/1
1 TABLET ORAL DAILY
Qty: 90 TABLET | Refills: 3 | Status: SHIPPED | OUTPATIENT
Start: 2023-01-26

## 2023-03-04 ENCOUNTER — WALK IN (OUTPATIENT)
Dept: URGENT CARE | Age: 68
End: 2023-03-04

## 2023-03-04 VITALS
RESPIRATION RATE: 18 BRPM | HEART RATE: 77 BPM | TEMPERATURE: 98.6 F | DIASTOLIC BLOOD PRESSURE: 70 MMHG | OXYGEN SATURATION: 97 % | SYSTOLIC BLOOD PRESSURE: 110 MMHG

## 2023-03-04 DIAGNOSIS — H72.91 PERFORATION OF RIGHT TYMPANIC MEMBRANE: Primary | ICD-10-CM

## 2023-03-04 DIAGNOSIS — J00 ACUTE NASOPHARYNGITIS: ICD-10-CM

## 2023-03-04 PROCEDURE — 99213 OFFICE O/P EST LOW 20 MIN: CPT | Performed by: NURSE PRACTITIONER

## 2023-03-04 RX ORDER — AMOXICILLIN 875 MG/1
875 TABLET, COATED ORAL EVERY 12 HOURS
Qty: 20 TABLET | Refills: 0 | Status: SHIPPED | OUTPATIENT
Start: 2023-03-04 | End: 2023-03-14

## 2023-03-04 ASSESSMENT — ENCOUNTER SYMPTOMS
DIZZINESS: 1
COUGH: 1
SORE THROAT: 0
RHINORRHEA: 1
FEVER: 0
SHORTNESS OF BREATH: 0
VOMITING: 0

## 2023-05-14 ENCOUNTER — V-VISIT (OUTPATIENT)
Dept: URGENT CARE | Age: 68
End: 2023-05-14

## 2023-05-14 VITALS
DIASTOLIC BLOOD PRESSURE: 68 MMHG | SYSTOLIC BLOOD PRESSURE: 100 MMHG | WEIGHT: 144 LBS | RESPIRATION RATE: 18 BRPM | HEIGHT: 61 IN | TEMPERATURE: 99.5 F | BODY MASS INDEX: 27.19 KG/M2 | OXYGEN SATURATION: 97 % | HEART RATE: 74 BPM

## 2023-05-14 DIAGNOSIS — J02.0 STREPTOCOCCAL PHARYNGITIS: Primary | ICD-10-CM

## 2023-05-14 DIAGNOSIS — J02.9 SORE THROAT: ICD-10-CM

## 2023-05-14 LAB
INTERNAL PROCEDURAL CONTROLS ACCEPTABLE: YES
S PYO AG THROAT QL IA.RAPID: POSITIVE
TEST LOT EXPIRATION DATE: ABNORMAL
TEST LOT NUMBER: ABNORMAL

## 2023-05-14 PROCEDURE — 99213 OFFICE O/P EST LOW 20 MIN: CPT | Performed by: NURSE PRACTITIONER

## 2023-05-14 PROCEDURE — 87880 STREP A ASSAY W/OPTIC: CPT | Performed by: NURSE PRACTITIONER

## 2023-05-14 RX ORDER — FLUCONAZOLE 150 MG/1
150 TABLET ORAL ONCE
Qty: 2 TABLET | Refills: 0 | Status: SHIPPED | OUTPATIENT
Start: 2023-05-14 | End: 2023-05-14

## 2023-05-14 RX ORDER — PENICILLIN V POTASSIUM 500 MG/1
500 TABLET ORAL 2 TIMES DAILY
Qty: 20 TABLET | Refills: 0 | Status: SHIPPED | OUTPATIENT
Start: 2023-05-14 | End: 2023-05-24

## 2023-05-14 ASSESSMENT — PAIN SCALES - GENERAL: PAINLEVEL: 8

## 2023-05-14 ASSESSMENT — ENCOUNTER SYMPTOMS: SORE THROAT: 1

## 2023-10-03 SDOH — ECONOMIC STABILITY: HOUSING INSECURITY
IN THE LAST 12 MONTHS, WAS THERE A TIME WHEN YOU DID NOT HAVE A STEADY PLACE TO SLEEP OR SLEPT IN A SHELTER (INCLUDING NOW)?: NO

## 2023-10-03 SDOH — ECONOMIC STABILITY: FOOD INSECURITY: WITHIN THE PAST 12 MONTHS, THE FOOD YOU BOUGHT JUST DIDN'T LAST AND YOU DIDN'T HAVE MONEY TO GET MORE.: NEVER TRUE

## 2023-10-03 SDOH — ECONOMIC STABILITY: TRANSPORTATION INSECURITY
IN THE PAST 12 MONTHS, HAS LACK OF TRANSPORTATION KEPT YOU FROM MEETINGS, WORK, OR FROM GETTING THINGS NEEDED FOR DAILY LIVING?: NO

## 2023-10-03 SDOH — HEALTH STABILITY: PHYSICAL HEALTH: ON AVERAGE, HOW MANY DAYS PER WEEK DO YOU ENGAGE IN MODERATE TO STRENUOUS EXERCISE (LIKE A BRISK WALK)?: 0 DAYS

## 2023-10-03 SDOH — ECONOMIC STABILITY: INCOME INSECURITY: HOW HARD IS IT FOR YOU TO PAY FOR THE VERY BASICS LIKE FOOD, HOUSING, MEDICAL CARE, AND HEATING?: NOT VERY HARD

## 2023-10-03 SDOH — ECONOMIC STABILITY: FOOD INSECURITY: WITHIN THE PAST 12 MONTHS, YOU WORRIED THAT YOUR FOOD WOULD RUN OUT BEFORE YOU GOT MONEY TO BUY MORE.: NEVER TRUE

## 2023-10-03 ASSESSMENT — LIFESTYLE VARIABLES
HOW OFTEN DURING THE LAST YEAR HAVE YOU BEEN UNABLE TO REMEMBER WHAT HAPPENED THE NIGHT BEFORE BECAUSE YOU HAD BEEN DRINKING: NEVER
HOW OFTEN DURING THE LAST YEAR HAVE YOU BEEN UNABLE TO REMEMBER WHAT HAPPENED THE NIGHT BEFORE BECAUSE YOU HAD BEEN DRINKING: 0
HOW OFTEN DURING THE LAST YEAR HAVE YOU FAILED TO DO WHAT WAS NORMALLY EXPECTED FROM YOU BECAUSE OF DRINKING: NEVER
HOW OFTEN DURING THE LAST YEAR HAVE YOU FOUND THAT YOU WERE NOT ABLE TO STOP DRINKING ONCE YOU HAD STARTED: 0
HAS A RELATIVE, FRIEND, DOCTOR, OR ANOTHER HEALTH PROFESSIONAL EXPRESSED CONCERN ABOUT YOUR DRINKING OR SUGGESTED YOU CUT DOWN: 0
HOW OFTEN DURING THE LAST YEAR HAVE YOU NEEDED AN ALCOHOLIC DRINK FIRST THING IN THE MORNING TO GET YOURSELF GOING AFTER A NIGHT OF HEAVY DRINKING: NEVER
HOW OFTEN DURING THE LAST YEAR HAVE YOU HAD A FEELING OF GUILT OR REMORSE AFTER DRINKING: 0
HAVE YOU OR SOMEONE ELSE BEEN INJURED AS A RESULT OF YOUR DRINKING: 0
HOW OFTEN DO YOU HAVE A DRINK CONTAINING ALCOHOL: MONTHLY OR LESS
HOW MANY STANDARD DRINKS CONTAINING ALCOHOL DO YOU HAVE ON A TYPICAL DAY: 2
HOW OFTEN DO YOU HAVE A DRINK CONTAINING ALCOHOL: 2
HOW OFTEN DURING THE LAST YEAR HAVE YOU NEEDED AN ALCOHOLIC DRINK FIRST THING IN THE MORNING TO GET YOURSELF GOING AFTER A NIGHT OF HEAVY DRINKING: 0
HAVE YOU OR SOMEONE ELSE BEEN INJURED AS A RESULT OF YOUR DRINKING: NO
HAS A RELATIVE, FRIEND, DOCTOR, OR ANOTHER HEALTH PROFESSIONAL EXPRESSED CONCERN ABOUT YOUR DRINKING OR SUGGESTED YOU CUT DOWN: NO
HOW OFTEN DO YOU HAVE SIX OR MORE DRINKS ON ONE OCCASION: 1
HOW OFTEN DURING THE LAST YEAR HAVE YOU HAD A FEELING OF GUILT OR REMORSE AFTER DRINKING: NEVER
HOW MANY STANDARD DRINKS CONTAINING ALCOHOL DO YOU HAVE ON A TYPICAL DAY: 3 OR 4
HOW OFTEN DURING THE LAST YEAR HAVE YOU FOUND THAT YOU WERE NOT ABLE TO STOP DRINKING ONCE YOU HAD STARTED: NEVER
HOW OFTEN DURING THE LAST YEAR HAVE YOU FAILED TO DO WHAT WAS NORMALLY EXPECTED FROM YOU BECAUSE OF DRINKING: 0

## 2023-10-03 ASSESSMENT — PATIENT HEALTH QUESTIONNAIRE - PHQ9
SUM OF ALL RESPONSES TO PHQ9 QUESTIONS 1 & 2: 0
SUM OF ALL RESPONSES TO PHQ QUESTIONS 1-9: 0
1. LITTLE INTEREST OR PLEASURE IN DOING THINGS: 0
2. FEELING DOWN, DEPRESSED OR HOPELESS: 0
SUM OF ALL RESPONSES TO PHQ QUESTIONS 1-9: 0

## 2023-10-06 ENCOUNTER — OFFICE VISIT (OUTPATIENT)
Age: 68
End: 2023-10-06
Payer: MEDICARE

## 2023-10-06 VITALS
HEART RATE: 62 BPM | BODY MASS INDEX: 30.8 KG/M2 | DIASTOLIC BLOOD PRESSURE: 85 MMHG | OXYGEN SATURATION: 97 % | RESPIRATION RATE: 16 BRPM | WEIGHT: 167.4 LBS | SYSTOLIC BLOOD PRESSURE: 132 MMHG | TEMPERATURE: 97.6 F | HEIGHT: 62 IN

## 2023-10-06 DIAGNOSIS — Z13.220 ENCOUNTER FOR LIPID SCREENING FOR CARDIOVASCULAR DISEASE: ICD-10-CM

## 2023-10-06 DIAGNOSIS — I10 PRIMARY HYPERTENSION: ICD-10-CM

## 2023-10-06 DIAGNOSIS — E55.9 VITAMIN D DEFICIENCY: ICD-10-CM

## 2023-10-06 DIAGNOSIS — Z23 INFLUENZA VACCINE NEEDED: ICD-10-CM

## 2023-10-06 DIAGNOSIS — D50.8 OTHER IRON DEFICIENCY ANEMIA: ICD-10-CM

## 2023-10-06 DIAGNOSIS — M81.0 AGE RELATED OSTEOPOROSIS, UNSPECIFIED PATHOLOGICAL FRACTURE PRESENCE: ICD-10-CM

## 2023-10-06 DIAGNOSIS — Z13.6 ENCOUNTER FOR LIPID SCREENING FOR CARDIOVASCULAR DISEASE: ICD-10-CM

## 2023-10-06 DIAGNOSIS — Z00.00 ENCOUNTER FOR SUBSEQUENT ANNUAL WELLNESS VISIT (AWV) IN MEDICARE PATIENT: Primary | ICD-10-CM

## 2023-10-06 DIAGNOSIS — R73.01 IMPAIRED FASTING GLUCOSE: ICD-10-CM

## 2023-10-06 DIAGNOSIS — E53.8 B12 DEFICIENCY: ICD-10-CM

## 2023-10-06 DIAGNOSIS — Z12.31 BREAST CANCER SCREENING BY MAMMOGRAM: ICD-10-CM

## 2023-10-06 DIAGNOSIS — R79.89 ELEVATED TSH: ICD-10-CM

## 2023-10-06 DIAGNOSIS — Z98.84 HISTORY OF GASTRIC BYPASS: ICD-10-CM

## 2023-10-06 PROBLEM — F17.200 SMOKER: Status: RESOLVED | Noted: 2022-10-28 | Resolved: 2023-10-06

## 2023-10-06 PROBLEM — K21.9 GASTROESOPHAGEAL REFLUX DISEASE WITHOUT ESOPHAGITIS: Status: ACTIVE | Noted: 2018-02-07

## 2023-10-06 PROCEDURE — 99213 OFFICE O/P EST LOW 20 MIN: CPT | Performed by: INTERNAL MEDICINE

## 2023-10-06 PROCEDURE — 90694 VACC AIIV4 NO PRSRV 0.5ML IM: CPT | Performed by: INTERNAL MEDICINE

## 2023-10-06 RX ORDER — POLYETHYLENE GLYCOL-3350 AND ELECTROLYTES 236; 6.74; 5.86; 2.97; 22.74 G/274.31G; G/274.31G; G/274.31G; G/274.31G; G/274.31G
POWDER, FOR SOLUTION ORAL
COMMUNITY
Start: 2023-07-21 | End: 2023-10-06

## 2023-10-06 RX ORDER — M-VIT,TX,IRON,MINS/CALC/FOLIC 27MG-0.4MG
1 TABLET ORAL DAILY
COMMUNITY

## 2023-10-06 ASSESSMENT — ENCOUNTER SYMPTOMS
SHORTNESS OF BREATH: 0
COUGH: 0
ABDOMINAL PAIN: 0

## 2023-10-06 ASSESSMENT — PATIENT HEALTH QUESTIONNAIRE - PHQ9
SUM OF ALL RESPONSES TO PHQ QUESTIONS 1-9: 0
1. LITTLE INTEREST OR PLEASURE IN DOING THINGS: 0
2. FEELING DOWN, DEPRESSED OR HOPELESS: 0
SUM OF ALL RESPONSES TO PHQ9 QUESTIONS 1 & 2: 0

## 2023-10-06 NOTE — PROGRESS NOTES
Medicare Annual Wellness Visit    David Mensah is here for Medicare AWV (Neck issues)    Assessment & Plan   Encounter for subsequent annual wellness visit (AWV) in Medicare patient  Primary hypertension  Assessment & Plan:   Controlled with current regimen, plan to continue  Orders:  -     Comprehensive Metabolic Panel; Future  Elevated TSH  -     TSH; Future  B12 deficiency  Assessment & Plan:  She switched to multivitamin, will check to see if adequate replacement for micronutrients  Orders:  -     Vitamin B12; Future  Other iron deficiency anemia  Assessment & Plan: Will continue monitoring labs  She is scheduled for EGD/colo next month, RGA    Orders:  -     CBC with Auto Differential; Future  -     Iron and TIBC; Future  -     Ferritin; Future  History of gastric bypass  Impaired fasting glucose  -     Hemoglobin A1C; Future  Age related osteoporosis, unspecified pathological fracture presence  Assessment & Plan:   S/p reclast 2016, again 10/2021, 11/2022  Scheduled for one more next month  DEXA due 10/2023  Orders:  -     DEXA BONE DENSITY AXIAL SKELETON; Future  Breast cancer screening by mammogram  -     JOSE DIGITAL SCREEN W OR WO CAD BILATERAL; Future  Encounter for lipid screening for cardiovascular disease  -     Lipid Panel; Future  Vitamin D deficiency  -     Vitamin D 25 Hydroxy; Future  Influenza vaccine needed  -     Influenza, FLUAD, (age 72 y+), IM, Preservative Free, 0.5 mL    Recommendations for Preventive Services Due: see orders and patient instructions/AVS.  Recommended screening schedule for the next 5-10 years is provided to the patient in written form: see Patient Instructions/AVS.     Return in about 6 months (around 4/6/2024) for chronic follow up. Subjective   Patient also here for review of chronic conditions, with statuses as documented in Assessment and Plan  Congratulated on quitting smoking      Review of Systems   Constitutional:  Negative for chills and fever.

## 2023-10-07 LAB
25(OH)D3 SERPL-MCNC: 48.5 NG/ML (ref 30–100)
ALBUMIN SERPL-MCNC: 4 G/DL (ref 3.5–5)
ALBUMIN/GLOB SERPL: 1.5 (ref 1.1–2.2)
ALP SERPL-CCNC: 49 U/L (ref 45–117)
ALT SERPL-CCNC: 45 U/L (ref 12–78)
ANION GAP SERPL CALC-SCNC: 2 MMOL/L (ref 5–15)
AST SERPL-CCNC: 23 U/L (ref 15–37)
BASOPHILS # BLD: 0 K/UL (ref 0–0.1)
BASOPHILS NFR BLD: 1 % (ref 0–1)
BILIRUB SERPL-MCNC: 0.5 MG/DL (ref 0.2–1)
BUN SERPL-MCNC: 11 MG/DL (ref 6–20)
BUN/CREAT SERPL: 23 (ref 12–20)
CALCIUM SERPL-MCNC: 9.5 MG/DL (ref 8.5–10.1)
CHLORIDE SERPL-SCNC: 104 MMOL/L (ref 97–108)
CHOLEST SERPL-MCNC: 167 MG/DL
CO2 SERPL-SCNC: 32 MMOL/L (ref 21–32)
CREAT SERPL-MCNC: 0.47 MG/DL (ref 0.55–1.02)
DIFFERENTIAL METHOD BLD: NORMAL
EOSINOPHIL # BLD: 0.1 K/UL (ref 0–0.4)
EOSINOPHIL NFR BLD: 2 % (ref 0–7)
ERYTHROCYTE [DISTWIDTH] IN BLOOD BY AUTOMATED COUNT: 13 % (ref 11.5–14.5)
EST. AVERAGE GLUCOSE BLD GHB EST-MCNC: 114 MG/DL
FERRITIN SERPL-MCNC: 17 NG/ML (ref 8–252)
GLOBULIN SER CALC-MCNC: 2.6 G/DL (ref 2–4)
GLUCOSE SERPL-MCNC: 90 MG/DL (ref 65–100)
HBA1C MFR BLD: 5.6 % (ref 4–5.6)
HCT VFR BLD AUTO: 42.6 % (ref 35–47)
HDLC SERPL-MCNC: 76 MG/DL
HDLC SERPL: 2.2 (ref 0–5)
HGB BLD-MCNC: 14.1 G/DL (ref 11.5–16)
IMM GRANULOCYTES # BLD AUTO: 0 K/UL (ref 0–0.04)
IMM GRANULOCYTES NFR BLD AUTO: 0 % (ref 0–0.5)
IRON SATN MFR SERPL: 28 % (ref 20–50)
IRON SERPL-MCNC: 117 UG/DL (ref 35–150)
LDLC SERPL CALC-MCNC: 76.2 MG/DL (ref 0–100)
LYMPHOCYTES # BLD: 1.1 K/UL (ref 0.8–3.5)
LYMPHOCYTES NFR BLD: 16 % (ref 12–49)
MCH RBC QN AUTO: 30.7 PG (ref 26–34)
MCHC RBC AUTO-ENTMCNC: 33.1 G/DL (ref 30–36.5)
MCV RBC AUTO: 92.6 FL (ref 80–99)
MONOCYTES # BLD: 0.5 K/UL (ref 0–1)
MONOCYTES NFR BLD: 7 % (ref 5–13)
NEUTS SEG # BLD: 4.9 K/UL (ref 1.8–8)
NEUTS SEG NFR BLD: 74 % (ref 32–75)
NRBC # BLD: 0 K/UL (ref 0–0.01)
NRBC BLD-RTO: 0 PER 100 WBC
PLATELET # BLD AUTO: 265 K/UL (ref 150–400)
PMV BLD AUTO: 12 FL (ref 8.9–12.9)
POTASSIUM SERPL-SCNC: 5 MMOL/L (ref 3.5–5.1)
PROT SERPL-MCNC: 6.6 G/DL (ref 6.4–8.2)
RBC # BLD AUTO: 4.6 M/UL (ref 3.8–5.2)
SODIUM SERPL-SCNC: 138 MMOL/L (ref 136–145)
TIBC SERPL-MCNC: 424 UG/DL (ref 250–450)
TRIGL SERPL-MCNC: 74 MG/DL
TSH SERPL DL<=0.05 MIU/L-ACNC: 2.92 UIU/ML (ref 0.36–3.74)
VIT B12 SERPL-MCNC: 839 PG/ML (ref 193–986)
VLDLC SERPL CALC-MCNC: 14.8 MG/DL
WBC # BLD AUTO: 6.6 K/UL (ref 3.6–11)

## 2023-10-20 ENCOUNTER — APPOINTMENT (OUTPATIENT)
Dept: URGENT CARE | Age: 68
End: 2023-10-20

## 2023-10-20 ENCOUNTER — V-VISIT (OUTPATIENT)
Dept: URGENT CARE | Age: 68
End: 2023-10-20

## 2023-10-20 VITALS
SYSTOLIC BLOOD PRESSURE: 102 MMHG | WEIGHT: 144 LBS | HEART RATE: 70 BPM | BODY MASS INDEX: 27.19 KG/M2 | TEMPERATURE: 98.9 F | HEIGHT: 61 IN | DIASTOLIC BLOOD PRESSURE: 68 MMHG | RESPIRATION RATE: 16 BRPM | OXYGEN SATURATION: 97 %

## 2023-10-20 DIAGNOSIS — J02.0 STREP PHARYNGITIS: Primary | ICD-10-CM

## 2023-10-20 DIAGNOSIS — J01.90 ACUTE NON-RECURRENT SINUSITIS, UNSPECIFIED LOCATION: ICD-10-CM

## 2023-10-20 PROCEDURE — 99213 OFFICE O/P EST LOW 20 MIN: CPT | Performed by: NURSE PRACTITIONER

## 2023-10-20 PROCEDURE — 87880 STREP A ASSAY W/OPTIC: CPT | Performed by: NURSE PRACTITIONER

## 2023-10-20 RX ORDER — FLUTICASONE PROPIONATE 50 MCG
1 SPRAY, SUSPENSION (ML) NASAL DAILY
Qty: 11.1 ML | Refills: 0 | Status: SHIPPED | OUTPATIENT
Start: 2023-10-20 | End: 2023-10-30

## 2023-10-20 RX ORDER — AMOXICILLIN 500 MG/1
500 CAPSULE ORAL 2 TIMES DAILY
Qty: 20 CAPSULE | Refills: 0 | Status: SHIPPED | OUTPATIENT
Start: 2023-10-20 | End: 2023-10-30

## 2023-10-20 ASSESSMENT — ENCOUNTER SYMPTOMS
SINUS PRESSURE: 1
SORE THROAT: 1
SWOLLEN GLANDS: 1
COUGH: 1
FATIGUE: 1
SINUS PAIN: 1

## 2023-10-20 ASSESSMENT — PAIN SCALES - GENERAL: PAINLEVEL: 8

## 2023-11-03 DIAGNOSIS — M81.0 OSTEOPOROSIS, UNSPECIFIED OSTEOPOROSIS TYPE, UNSPECIFIED PATHOLOGICAL FRACTURE PRESENCE: Primary | ICD-10-CM

## 2023-11-03 RX ORDER — ALBUTEROL SULFATE 90 UG/1
4 AEROSOL, METERED RESPIRATORY (INHALATION) PRN
OUTPATIENT
Start: 2023-11-10

## 2023-11-03 RX ORDER — DIPHENHYDRAMINE HYDROCHLORIDE 50 MG/ML
50 INJECTION INTRAMUSCULAR; INTRAVENOUS
OUTPATIENT
Start: 2023-11-10

## 2023-11-03 RX ORDER — FAMOTIDINE 10 MG/ML
20 INJECTION, SOLUTION INTRAVENOUS
OUTPATIENT
Start: 2023-11-10

## 2023-11-03 RX ORDER — ONDANSETRON 2 MG/ML
8 INJECTION INTRAMUSCULAR; INTRAVENOUS
OUTPATIENT
Start: 2023-11-10

## 2023-11-03 RX ORDER — SODIUM CHLORIDE 9 MG/ML
5-250 INJECTION, SOLUTION INTRAVENOUS PRN
OUTPATIENT
Start: 2023-11-10

## 2023-11-03 RX ORDER — EPINEPHRINE 1 MG/ML
0.3 INJECTION, SOLUTION, CONCENTRATE INTRAVENOUS PRN
OUTPATIENT
Start: 2023-11-10

## 2023-11-03 RX ORDER — SODIUM CHLORIDE 0.9 % (FLUSH) 0.9 %
5-40 SYRINGE (ML) INJECTION PRN
OUTPATIENT
Start: 2023-11-10

## 2023-11-03 RX ORDER — HEPARIN SODIUM (PORCINE) LOCK FLUSH IV SOLN 100 UNIT/ML 100 UNIT/ML
500 SOLUTION INTRAVENOUS PRN
OUTPATIENT
Start: 2023-11-10

## 2023-11-03 RX ORDER — ZOLEDRONIC ACID 5 MG/100ML
5 INJECTION, SOLUTION INTRAVENOUS ONCE
OUTPATIENT
Start: 2023-11-10 | End: 2023-11-10

## 2023-11-03 RX ORDER — ACETAMINOPHEN 325 MG/1
650 TABLET ORAL
OUTPATIENT
Start: 2023-11-10

## 2023-11-03 RX ORDER — SODIUM CHLORIDE 9 MG/ML
INJECTION, SOLUTION INTRAVENOUS CONTINUOUS
OUTPATIENT
Start: 2023-11-10

## 2023-11-10 ENCOUNTER — APPOINTMENT (OUTPATIENT)
Dept: INFUSION THERAPY | Age: 68
End: 2023-11-10

## 2023-11-10 ENCOUNTER — HOSPITAL ENCOUNTER (OUTPATIENT)
Facility: HOSPITAL | Age: 68
Setting detail: INFUSION SERIES
Discharge: HOME | End: 2023-11-10
Payer: MEDICARE

## 2023-11-10 VITALS
BODY MASS INDEX: 30.68 KG/M2 | SYSTOLIC BLOOD PRESSURE: 115 MMHG | WEIGHT: 168 LBS | HEART RATE: 80 BPM | TEMPERATURE: 97.8 F | RESPIRATION RATE: 16 BRPM | DIASTOLIC BLOOD PRESSURE: 77 MMHG

## 2023-11-10 DIAGNOSIS — M81.0 OSTEOPOROSIS, UNSPECIFIED OSTEOPOROSIS TYPE, UNSPECIFIED PATHOLOGICAL FRACTURE PRESENCE: Primary | ICD-10-CM

## 2023-11-10 PROCEDURE — 2580000003 HC RX 258: Performed by: INTERNAL MEDICINE

## 2023-11-10 PROCEDURE — 96374 THER/PROPH/DIAG INJ IV PUSH: CPT

## 2023-11-10 PROCEDURE — 6360000002 HC RX W HCPCS: Performed by: INTERNAL MEDICINE

## 2023-11-10 RX ORDER — HEPARIN 100 UNIT/ML
500 SYRINGE INTRAVENOUS PRN
OUTPATIENT
Start: 2024-11-03

## 2023-11-10 RX ORDER — HEPARIN 100 UNIT/ML
500 SYRINGE INTRAVENOUS PRN
Status: DISCONTINUED | OUTPATIENT
Start: 2023-11-10 | End: 2023-11-11 | Stop reason: HOSPADM

## 2023-11-10 RX ORDER — SODIUM CHLORIDE 9 MG/ML
INJECTION, SOLUTION INTRAVENOUS CONTINUOUS
OUTPATIENT
Start: 2024-11-03

## 2023-11-10 RX ORDER — SODIUM CHLORIDE 0.9 % (FLUSH) 0.9 %
5-40 SYRINGE (ML) INJECTION PRN
OUTPATIENT
Start: 2024-11-03

## 2023-11-10 RX ORDER — ZOLEDRONIC ACID 5 MG/100ML
5 INJECTION, SOLUTION INTRAVENOUS ONCE
Status: COMPLETED | OUTPATIENT
Start: 2023-11-10 | End: 2023-11-10

## 2023-11-10 RX ORDER — SODIUM CHLORIDE 9 MG/ML
5-250 INJECTION, SOLUTION INTRAVENOUS PRN
Status: DISCONTINUED | OUTPATIENT
Start: 2023-11-10 | End: 2023-11-11 | Stop reason: HOSPADM

## 2023-11-10 RX ORDER — EPINEPHRINE 1 MG/ML
0.3 INJECTION, SOLUTION, CONCENTRATE INTRAVENOUS PRN
OUTPATIENT
Start: 2024-11-03

## 2023-11-10 RX ORDER — SODIUM CHLORIDE 9 MG/ML
5-250 INJECTION, SOLUTION INTRAVENOUS PRN
OUTPATIENT
Start: 2024-11-03

## 2023-11-10 RX ORDER — ZOLEDRONIC ACID 5 MG/100ML
5 INJECTION, SOLUTION INTRAVENOUS ONCE
OUTPATIENT
Start: 2024-11-03 | End: 2024-11-03

## 2023-11-10 RX ORDER — ACETAMINOPHEN 325 MG/1
650 TABLET ORAL
OUTPATIENT
Start: 2024-11-03

## 2023-11-10 RX ORDER — DIPHENHYDRAMINE HYDROCHLORIDE 50 MG/ML
50 INJECTION INTRAMUSCULAR; INTRAVENOUS
OUTPATIENT
Start: 2024-11-03

## 2023-11-10 RX ORDER — ONDANSETRON 2 MG/ML
8 INJECTION INTRAMUSCULAR; INTRAVENOUS
OUTPATIENT
Start: 2024-11-03

## 2023-11-10 RX ORDER — SODIUM CHLORIDE 0.9 % (FLUSH) 0.9 %
5-40 SYRINGE (ML) INJECTION PRN
Status: DISCONTINUED | OUTPATIENT
Start: 2023-11-10 | End: 2023-11-11 | Stop reason: HOSPADM

## 2023-11-10 RX ORDER — ALBUTEROL SULFATE 90 UG/1
4 AEROSOL, METERED RESPIRATORY (INHALATION) PRN
OUTPATIENT
Start: 2024-11-03

## 2023-11-10 RX ADMIN — ZOLEDRONIC ACID 5 MG: 5 INJECTION, SOLUTION INTRAVENOUS at 09:21

## 2023-11-10 RX ADMIN — SODIUM CHLORIDE 25 ML/HR: 9 INJECTION, SOLUTION INTRAVENOUS at 09:20

## 2023-11-17 ENCOUNTER — HOSPITAL ENCOUNTER (OUTPATIENT)
Age: 68
End: 2023-11-17
Payer: MEDICARE

## 2023-11-17 VITALS — WEIGHT: 169 LBS | HEIGHT: 62 IN | BODY MASS INDEX: 31.1 KG/M2

## 2023-11-17 DIAGNOSIS — Z12.31 BREAST CANCER SCREENING BY MAMMOGRAM: ICD-10-CM

## 2023-11-17 DIAGNOSIS — M81.0 AGE RELATED OSTEOPOROSIS, UNSPECIFIED PATHOLOGICAL FRACTURE PRESENCE: ICD-10-CM

## 2023-11-17 PROCEDURE — 77080 DXA BONE DENSITY AXIAL: CPT

## 2023-11-17 PROCEDURE — 77067 SCR MAMMO BI INCL CAD: CPT

## 2023-11-20 ENCOUNTER — V-VISIT (OUTPATIENT)
Dept: URGENT CARE | Age: 68
End: 2023-11-20

## 2023-11-20 VITALS
HEART RATE: 74 BPM | DIASTOLIC BLOOD PRESSURE: 66 MMHG | WEIGHT: 144 LBS | RESPIRATION RATE: 14 BRPM | BODY MASS INDEX: 27.19 KG/M2 | TEMPERATURE: 97.8 F | SYSTOLIC BLOOD PRESSURE: 104 MMHG | OXYGEN SATURATION: 95 % | HEIGHT: 61 IN

## 2023-11-20 DIAGNOSIS — J32.9 CHRONIC SINUSITIS, UNSPECIFIED LOCATION: Primary | ICD-10-CM

## 2023-11-20 DIAGNOSIS — J20.9 ACUTE BRONCHITIS, UNSPECIFIED ORGANISM: ICD-10-CM

## 2023-11-20 PROCEDURE — 99213 OFFICE O/P EST LOW 20 MIN: CPT | Performed by: NURSE PRACTITIONER

## 2023-11-20 RX ORDER — METFORMIN HYDROCHLORIDE 500 MG/1
1 TABLET, EXTENDED RELEASE ORAL 2 TIMES DAILY WITH MEALS
COMMUNITY
Start: 2023-07-20

## 2023-11-20 RX ORDER — DOXYCYCLINE HYCLATE 100 MG
100 TABLET ORAL 2 TIMES DAILY
Qty: 14 TABLET | Refills: 0 | Status: SHIPPED | OUTPATIENT
Start: 2023-11-20 | End: 2023-11-27

## 2023-11-20 RX ORDER — BUPROPION HYDROCHLORIDE 150 MG/1
3 TABLET ORAL DAILY
COMMUNITY
Start: 2023-07-20

## 2023-11-20 RX ORDER — METHYLPREDNISOLONE 4 MG/1
4 TABLET ORAL SEE ADMIN INSTRUCTIONS
Qty: 21 TABLET | Refills: 0 | Status: SHIPPED | OUTPATIENT
Start: 2023-11-20 | End: 2023-11-26

## 2023-11-20 ASSESSMENT — ENCOUNTER SYMPTOMS
TROUBLE SWALLOWING: 0
WEAKNESS: 0
SINUS PAIN: 1
STRIDOR: 0
SORE THROAT: 0
COUGH: 1
FATIGUE: 1
VOICE CHANGE: 0
FEVER: 0
HEADACHES: 1
CHILLS: 0
DIZZINESS: 0
SINUS PRESSURE: 1
ALLERGIC/IMMUNOLOGIC NEGATIVE: 1
CHEST TIGHTNESS: 0
GASTROINTESTINAL NEGATIVE: 1
WHEEZING: 1
SHORTNESS OF BREATH: 0
EYES NEGATIVE: 1
LIGHT-HEADEDNESS: 0

## 2023-11-20 ASSESSMENT — PAIN SCALES - GENERAL: PAINLEVEL: 9

## 2023-11-24 ENCOUNTER — LAB SERVICES (OUTPATIENT)
Dept: LAB | Age: 68
End: 2023-11-24

## 2023-12-04 ENCOUNTER — ANESTHESIA (OUTPATIENT)
Facility: HOSPITAL | Age: 68
End: 2023-12-04
Payer: MEDICARE

## 2023-12-04 ENCOUNTER — HOSPITAL ENCOUNTER (OUTPATIENT)
Facility: HOSPITAL | Age: 68
Setting detail: OUTPATIENT SURGERY
Discharge: HOME | End: 2023-12-04
Attending: INTERNAL MEDICINE | Admitting: INTERNAL MEDICINE
Payer: MEDICARE

## 2023-12-04 ENCOUNTER — ANESTHESIA EVENT (OUTPATIENT)
Facility: HOSPITAL | Age: 68
End: 2023-12-04
Payer: MEDICARE

## 2023-12-04 VITALS
HEIGHT: 62 IN | WEIGHT: 168 LBS | RESPIRATION RATE: 22 BRPM | OXYGEN SATURATION: 98 % | BODY MASS INDEX: 30.91 KG/M2 | DIASTOLIC BLOOD PRESSURE: 54 MMHG | TEMPERATURE: 98 F | SYSTOLIC BLOOD PRESSURE: 118 MMHG | HEART RATE: 64 BPM

## 2023-12-04 PROCEDURE — 88305 TISSUE EXAM BY PATHOLOGIST: CPT

## 2023-12-04 PROCEDURE — 3600007502: Performed by: INTERNAL MEDICINE

## 2023-12-04 PROCEDURE — 7100000011 HC PHASE II RECOVERY - ADDTL 15 MIN: Performed by: INTERNAL MEDICINE

## 2023-12-04 PROCEDURE — 2580000003 HC RX 258: Performed by: NURSE PRACTITIONER

## 2023-12-04 PROCEDURE — 7100000010 HC PHASE II RECOVERY - FIRST 15 MIN: Performed by: INTERNAL MEDICINE

## 2023-12-04 PROCEDURE — 3700000000 HC ANESTHESIA ATTENDED CARE: Performed by: INTERNAL MEDICINE

## 2023-12-04 PROCEDURE — 2500000003 HC RX 250 WO HCPCS: Performed by: NURSE PRACTITIONER

## 2023-12-04 PROCEDURE — 3700000001 HC ADD 15 MINUTES (ANESTHESIA): Performed by: INTERNAL MEDICINE

## 2023-12-04 PROCEDURE — 3600007512: Performed by: INTERNAL MEDICINE

## 2023-12-04 PROCEDURE — 6360000002 HC RX W HCPCS: Performed by: NURSE PRACTITIONER

## 2023-12-04 RX ORDER — LIDOCAINE HYDROCHLORIDE 20 MG/ML
INJECTION, SOLUTION EPIDURAL; INFILTRATION; INTRACAUDAL; PERINEURAL PRN
Status: DISCONTINUED | OUTPATIENT
Start: 2023-12-04 | End: 2023-12-04 | Stop reason: SDUPTHER

## 2023-12-04 RX ORDER — SODIUM CHLORIDE 9 MG/ML
INJECTION, SOLUTION INTRAVENOUS CONTINUOUS PRN
Status: DISCONTINUED | OUTPATIENT
Start: 2023-12-04 | End: 2023-12-04 | Stop reason: SDUPTHER

## 2023-12-04 RX ORDER — SODIUM CHLORIDE 9 MG/ML
25 INJECTION, SOLUTION INTRAVENOUS PRN
Status: DISCONTINUED | OUTPATIENT
Start: 2023-12-04 | End: 2023-12-04 | Stop reason: HOSPADM

## 2023-12-04 RX ORDER — SODIUM CHLORIDE 0.9 % (FLUSH) 0.9 %
5-40 SYRINGE (ML) INJECTION PRN
Status: DISCONTINUED | OUTPATIENT
Start: 2023-12-04 | End: 2023-12-04 | Stop reason: HOSPADM

## 2023-12-04 RX ORDER — SODIUM CHLORIDE 9 MG/ML
INJECTION, SOLUTION INTRAVENOUS CONTINUOUS
Status: DISCONTINUED | OUTPATIENT
Start: 2023-12-04 | End: 2023-12-04 | Stop reason: HOSPADM

## 2023-12-04 RX ORDER — SODIUM CHLORIDE 0.9 % (FLUSH) 0.9 %
5-40 SYRINGE (ML) INJECTION EVERY 12 HOURS SCHEDULED
Status: DISCONTINUED | OUTPATIENT
Start: 2023-12-04 | End: 2023-12-04 | Stop reason: HOSPADM

## 2023-12-04 RX ADMIN — PROPOFOL 30 MG: 10 INJECTION, EMULSION INTRAVENOUS at 12:36

## 2023-12-04 RX ADMIN — PROPOFOL 50 MG: 10 INJECTION, EMULSION INTRAVENOUS at 12:22

## 2023-12-04 RX ADMIN — PROPOFOL 50 MG: 10 INJECTION, EMULSION INTRAVENOUS at 12:28

## 2023-12-04 RX ADMIN — PROPOFOL 30 MG: 10 INJECTION, EMULSION INTRAVENOUS at 12:55

## 2023-12-04 RX ADMIN — SODIUM CHLORIDE: 9 INJECTION, SOLUTION INTRAVENOUS at 12:50

## 2023-12-04 RX ADMIN — PROPOFOL 50 MG: 10 INJECTION, EMULSION INTRAVENOUS at 12:23

## 2023-12-04 RX ADMIN — SODIUM CHLORIDE: 9 INJECTION, SOLUTION INTRAVENOUS at 12:05

## 2023-12-04 RX ADMIN — PROPOFOL 20 MG: 10 INJECTION, EMULSION INTRAVENOUS at 12:21

## 2023-12-04 RX ADMIN — PROPOFOL 30 MG: 10 INJECTION, EMULSION INTRAVENOUS at 12:45

## 2023-12-04 RX ADMIN — PROPOFOL 30 MG: 10 INJECTION, EMULSION INTRAVENOUS at 12:39

## 2023-12-04 RX ADMIN — PROPOFOL 30 MG: 10 INJECTION, EMULSION INTRAVENOUS at 12:51

## 2023-12-04 RX ADMIN — PROPOFOL 25 MG: 10 INJECTION, EMULSION INTRAVENOUS at 12:25

## 2023-12-04 RX ADMIN — PROPOFOL 80 MG: 10 INJECTION, EMULSION INTRAVENOUS at 12:20

## 2023-12-04 RX ADMIN — LIDOCAINE HYDROCHLORIDE 80 MG: 20 INJECTION, SOLUTION EPIDURAL; INFILTRATION; INTRACAUDAL; PERINEURAL at 12:20

## 2023-12-04 RX ADMIN — PROPOFOL 30 MG: 10 INJECTION, EMULSION INTRAVENOUS at 12:30

## 2023-12-04 RX ADMIN — PROPOFOL 25 MG: 10 INJECTION, EMULSION INTRAVENOUS at 12:27

## 2023-12-04 RX ADMIN — PROPOFOL 30 MG: 10 INJECTION, EMULSION INTRAVENOUS at 12:42

## 2023-12-04 RX ADMIN — PROPOFOL 30 MG: 10 INJECTION, EMULSION INTRAVENOUS at 12:33

## 2023-12-04 RX ADMIN — PROPOFOL 30 MG: 10 INJECTION, EMULSION INTRAVENOUS at 12:48

## 2023-12-04 ASSESSMENT — PAIN - FUNCTIONAL ASSESSMENT: PAIN_FUNCTIONAL_ASSESSMENT: NONE - DENIES PAIN

## 2023-12-04 NOTE — DISCHARGE INSTRUCTIONS
[facility]  [facility] [address]  CJossue Aide Quinones MD  [phone]      December 4, 2023     Margarita Lala  YOB: 1955    ENDOSCOPY/COLONOSCOPY DISCHARGE INSTRUCTIONS    If there is redness at IV site you should apply warm compress to area. If redness or soreness persist contact Dr. Zina Gil or your primary care doctor. Gaseous discomfort may develop, but walking, belching will help relieve this. There may be a slight amount of blood passed from the rectum. Gaseous discomfort may develop, but walking, belching will help relieve this. You may not operate a vehicle for 12 hours  You may not operate machinery or dangerous appliances for rest of today  You may not drink alcoholic beverages for 12 hours  Avoid making any critical decisions for 24 hours    DIET:  You may resume your normal diet, but some patients find that heavy or large meals may lead to indigestion or vomiting. I suggest a light meal as first food intake. MEDICATIONS:  The use of some over-the-counter pain medication may lead to bleeding after biopsies or other procedures you may have had done. Tylenol (also called acetaminophen) is safe to take and will not lead to bleeding. Based on the procedure you had today you may safely take aspirin or aspirin-like products for the next seven (7) days. ACTIVITY:  You may resume your normal household activities, but it is recommended that you spend the remainder of the day resting -  avoid any strenuous activity. CALL DR. Cuba Hall OFFICE IF:  Increasing pain, nausea, vomiting  Abdominal distension (swelling)  Significant new or increased bleeding (oral or rectal)  Fever/Chills  Chest pain/shortness of breath                   Additional instructions:   Impressions:  EGD:  Irregular z line but otherwise normal upper endoscopy s/p michele en y gastric bypass. Colonoscopy: Left sided diverticulosis.

## 2023-12-04 NOTE — H&P
76 y.o. female presents for diagnostic upper endoscopy for dysphagia and colonoscopy for personal history of colon polyps and family history of colon cancer. Additional H&P data will be attached on the day of procedure.     Haydee Voss MD

## 2023-12-04 NOTE — OP NOTE
[facility]  [facility] - [address] [address]. Mayuri Foley MD  [phone]      2023    Esophagogastroduodenoscopy & Colonoscopy Procedure Note  Lord Weinberg  : 1955  [address] [facility] Medical Record Number: [id number]      Indications:   History of gastric bypass, intermittent dysphagia, colon cancer screening  Referring Physician:  Buzz Berg MD  Anesthesia/Sedation: See Anesthesia Record  Endoscopist:  Dr. Atiya Magaña  Complications:  None  Estimated Blood Loss:  None    Surgical assistant: Circulator: Armen Lafleur RN  Endoscopy Technician: Deena Barragan none unless otherwise specified. Permit:  The indications, risks, benefits and alternatives were reviewed with the patient or their decision maker who was provided an opportunity to ask questions and all questions were answered. The specific risks of esophagogastroduodenoscopy and colonoscopy with conscious sedation were reviewed, including but not limited to anesthetic complication, bleeding, adverse drug reaction, missed lesion, infection, IV site reactions, and intestinal perforation which would lead to the need for surgical repair. Alternatives to EGD and colonoscopy including radiographic imaging, observation without testing, or laboratory testing were reviewed as well as the limitations of those alternatives discussed. After considering the options and having all their questions answered, the patient or their decision maker provided both verbal and written consent to proceed. -----------EGD------------   Procedure in Detail:  After obtaining informed consent, positioning of the patient in the left lateral decubitus position, and conduction of a pre-procedure pause or \"time out\" the endoscope was introduced into the mouth and advanced to the duodenum.   A careful inspection was made, and findings or interventions are described

## 2023-12-04 NOTE — INTERVAL H&P NOTE
OTHER SURGICAL HISTORY      TUMOR REMOVED RIGHT UPPER FLANK AREA    OTHER SURGICAL HISTORY  2018    resection of gastric pouch by Dr Carbajal Lay History     Tobacco Use    Smoking status: Former     Packs/day: 0.50     Years: 15.00     Additional pack years: 0.00     Total pack years: 7.50     Types: Cigarettes     Quit date: 2023     Years since quittin.2    Smokeless tobacco: Never   Substance Use Topics    Alcohol use: Yes     Alcohol/week: 2.0 standard drinks of alcohol     Types: 2 Cans of beer per week      Family History   Problem Relation Age of Onset    Obesity Mother     Lung Disease Mother     Cancer Mother         LUNG    Other Mother         hypoglycemia, obese,Marinette lu syndrome    Other Father         Nora Boros lu syndrome gene trait    Cancer Father         GENERALIZED    Lung Disease Sister     Asthma Sister     Cervical Cancer Sister     Other Brother         Marinette lu syndrome    Cancer Brother         COLON    Lung Disease Brother     Diabetes Brother     Thyroid Disease Brother     Hypertension Brother     Lung Disease Brother     Heart Disease Maternal Grandmother     Heart Disease Maternal Grandfather     Heart Disease Paternal Grandmother     Heart Disease Paternal Grandfather     Heart Disease Maternal Aunt     Cancer Maternal Aunt     Cancer Maternal Uncle         UNKNOWN    Cancer Maternal Uncle     Cancer Paternal Aunt         BREAST    Cancer Paternal Uncle     Cancer Paternal Uncle     Anesth Problems Neg Hx       Allergies   Allergen Reactions    Codeine Hives     Tolerates Dilaudid      Prior to Admission Medications   Prescriptions Last Dose Informant Patient Reported? Taking?    Multiple Vitamins-Minerals (THERAPEUTIC MULTIVITAMIN-MINERALS) tablet   Yes No   Sig: Take 1 tablet by mouth daily   diphenhydrAMINE (BENADRYL) 25 MG capsule   Yes No   Sig: Take 1 capsule by

## 2023-12-04 NOTE — PROGRESS NOTES

## 2024-01-05 RX ORDER — LOSARTAN POTASSIUM AND HYDROCHLOROTHIAZIDE 12.5; 5 MG/1; MG/1
1 TABLET ORAL DAILY
Qty: 90 TABLET | Refills: 1 | Status: SHIPPED | OUTPATIENT
Start: 2024-01-05

## 2024-04-08 RX ORDER — LOSARTAN POTASSIUM AND HYDROCHLOROTHIAZIDE 12.5; 5 MG/1; MG/1
1 TABLET ORAL DAILY
Qty: 90 TABLET | Refills: 1 | Status: SHIPPED | OUTPATIENT
Start: 2024-04-08

## 2024-04-10 ENCOUNTER — OFFICE VISIT (OUTPATIENT)
Age: 69
End: 2024-04-10
Payer: MEDICARE

## 2024-04-10 VITALS
HEIGHT: 62 IN | SYSTOLIC BLOOD PRESSURE: 130 MMHG | DIASTOLIC BLOOD PRESSURE: 81 MMHG | WEIGHT: 174.4 LBS | BODY MASS INDEX: 32.09 KG/M2 | OXYGEN SATURATION: 99 % | RESPIRATION RATE: 16 BRPM | TEMPERATURE: 97.6 F | HEART RATE: 63 BPM

## 2024-04-10 DIAGNOSIS — R79.89 ELEVATED TSH: ICD-10-CM

## 2024-04-10 DIAGNOSIS — K21.9 GASTROESOPHAGEAL REFLUX DISEASE WITHOUT ESOPHAGITIS: ICD-10-CM

## 2024-04-10 DIAGNOSIS — I10 PRIMARY HYPERTENSION: ICD-10-CM

## 2024-04-10 DIAGNOSIS — E03.8 SUBCLINICAL HYPOTHYROIDISM: ICD-10-CM

## 2024-04-10 DIAGNOSIS — D51.0 PERNICIOUS ANEMIA: ICD-10-CM

## 2024-04-10 DIAGNOSIS — D50.9 IRON DEFICIENCY ANEMIA, UNSPECIFIED IRON DEFICIENCY ANEMIA TYPE: ICD-10-CM

## 2024-04-10 DIAGNOSIS — J30.1 SEASONAL ALLERGIC RHINITIS DUE TO POLLEN: ICD-10-CM

## 2024-04-10 DIAGNOSIS — M81.0 OSTEOPOROSIS, UNSPECIFIED OSTEOPOROSIS TYPE, UNSPECIFIED PATHOLOGICAL FRACTURE PRESENCE: Primary | ICD-10-CM

## 2024-04-10 DIAGNOSIS — M47.812 OSTEOARTHRITIS OF CERVICAL SPINE, UNSPECIFIED SPINAL OSTEOARTHRITIS COMPLICATION STATUS: ICD-10-CM

## 2024-04-10 LAB
ANION GAP SERPL CALC-SCNC: 5 MMOL/L (ref 5–15)
BUN SERPL-MCNC: 11 MG/DL (ref 6–20)
BUN/CREAT SERPL: 22 (ref 12–20)
CALCIUM SERPL-MCNC: 10 MG/DL (ref 8.5–10.1)
CHLORIDE SERPL-SCNC: 103 MMOL/L (ref 97–108)
CO2 SERPL-SCNC: 32 MMOL/L (ref 21–32)
CREAT SERPL-MCNC: 0.5 MG/DL (ref 0.55–1.02)
CREAT UR-MCNC: 17.7 MG/DL
GLUCOSE SERPL-MCNC: 95 MG/DL (ref 65–100)
MICROALBUMIN UR-MCNC: <0.5 MG/DL
MICROALBUMIN/CREAT UR-RTO: <28 MG/G (ref 0–30)
POTASSIUM SERPL-SCNC: 4.6 MMOL/L (ref 3.5–5.1)
SODIUM SERPL-SCNC: 140 MMOL/L (ref 136–145)
TSH SERPL DL<=0.05 MIU/L-ACNC: 3.04 UIU/ML (ref 0.36–3.74)

## 2024-04-10 PROCEDURE — G8417 CALC BMI ABV UP PARAM F/U: HCPCS | Performed by: INTERNAL MEDICINE

## 2024-04-10 PROCEDURE — 3079F DIAST BP 80-89 MM HG: CPT | Performed by: INTERNAL MEDICINE

## 2024-04-10 PROCEDURE — 3017F COLORECTAL CA SCREEN DOC REV: CPT | Performed by: INTERNAL MEDICINE

## 2024-04-10 PROCEDURE — G8427 DOCREV CUR MEDS BY ELIG CLIN: HCPCS | Performed by: INTERNAL MEDICINE

## 2024-04-10 PROCEDURE — 1123F ACP DISCUSS/DSCN MKR DOCD: CPT | Performed by: INTERNAL MEDICINE

## 2024-04-10 PROCEDURE — 1036F TOBACCO NON-USER: CPT | Performed by: INTERNAL MEDICINE

## 2024-04-10 PROCEDURE — 99214 OFFICE O/P EST MOD 30 MIN: CPT | Performed by: INTERNAL MEDICINE

## 2024-04-10 PROCEDURE — 3075F SYST BP GE 130 - 139MM HG: CPT | Performed by: INTERNAL MEDICINE

## 2024-04-10 PROCEDURE — 1090F PRES/ABSN URINE INCON ASSESS: CPT | Performed by: INTERNAL MEDICINE

## 2024-04-10 PROCEDURE — G8399 PT W/DXA RESULTS DOCUMENT: HCPCS | Performed by: INTERNAL MEDICINE

## 2024-04-10 ASSESSMENT — PATIENT HEALTH QUESTIONNAIRE - PHQ9
SUM OF ALL RESPONSES TO PHQ QUESTIONS 1-9: 0
2. FEELING DOWN, DEPRESSED OR HOPELESS: NOT AT ALL
SUM OF ALL RESPONSES TO PHQ QUESTIONS 1-9: 0
SUM OF ALL RESPONSES TO PHQ9 QUESTIONS 1 & 2: 0
SUM OF ALL RESPONSES TO PHQ QUESTIONS 1-9: 0
1. LITTLE INTEREST OR PLEASURE IN DOING THINGS: NOT AT ALL
SUM OF ALL RESPONSES TO PHQ QUESTIONS 1-9: 0

## 2024-04-10 ASSESSMENT — ENCOUNTER SYMPTOMS
COUGH: 0
ABDOMINAL PAIN: 0

## 2024-04-10 NOTE — ASSESSMENT & PLAN NOTE
S/p reclast 2016, again 10/2021, 11/2022, 11/2023  Now on holiday  DEXA late 2023 showed improvement into osteopenia range  Repeat 2-3 years

## 2024-04-10 NOTE — PROGRESS NOTES
4/10/2024    Yue Barron 1955 is a 68 y.o. year old female established patient,   here for evaluation of the following chief complaint(s):  Chief Complaint   Patient presents with    Follow-up     Pt here today for 6 month follow up. Lore Vogt CMA     Hypertension           ASSESSMENT/PLAN:  Below is the assessment and plan developed based on review of pertinent history, physical exam, labs, studies, and medications.    1. Osteoporosis, unspecified osteoporosis type, unspecified pathological fracture presence  Assessment & Plan:   S/p reclast 2016, again 10/2021, 11/2022, 11/2023  Now on holiday  DEXA late 2023 showed improvement into osteopenia range  Repeat 2-3 years  2. Osteoarthritis of cervical spine, unspecified spinal osteoarthritis complication status  Assessment & Plan:  [facility]  Will also consult with pain management  3. Primary hypertension  Assessment & Plan:  Controlled with current regimen, plan to continue  Orders:  -     Basic Metabolic Panel; Future  -     Microalbumin / Creatinine Urine Ratio; Future  4. Seasonal allergic rhinitis due to pollen  Assessment & Plan:  Suggest trying nasal rinse and or nasal spray like flonase  Continue antihistamine  5. Iron deficiency anemia, unspecified iron deficiency anemia type  Assessment & Plan:  Normalized on last check  EGD/colo OK end of 2023  6. Gastroesophageal reflux disease without esophagitis  Assessment & Plan:  Not requiring meds, continue lifestyle measures  7. Subclinical hypothyroidism  -     TSH; Future  8. Elevated TSH  -     TSH; Future  9. Pernicious anemia  Assessment & Plan:  Levels maintained on oral supplement         Return in about 6 months (around 10/10/2024) for annual medicare wellness exam, or sooner as needed.       SUBJECTIVE/OBJECTIVE:  Patient here for review of chronic conditions, with statuses as documented in Assessment and Plan    S/p cataract surgery on the right that went well  Using

## 2024-05-25 ENCOUNTER — V-VISIT (OUTPATIENT)
Dept: URGENT CARE | Age: 69
End: 2024-05-25

## 2024-05-25 VITALS
BODY MASS INDEX: 27.56 KG/M2 | HEIGHT: 61 IN | HEART RATE: 68 BPM | TEMPERATURE: 98.4 F | DIASTOLIC BLOOD PRESSURE: 64 MMHG | RESPIRATION RATE: 16 BRPM | WEIGHT: 146 LBS | SYSTOLIC BLOOD PRESSURE: 110 MMHG | OXYGEN SATURATION: 99 %

## 2024-05-25 DIAGNOSIS — U07.1 COVID-19 VIRUS INFECTION: Primary | ICD-10-CM

## 2024-05-25 LAB
FLUAV AG UPPER RESP QL IA.RAPID: NEGATIVE
FLUBV AG UPPER RESP QL IA.RAPID: NEGATIVE
INTERNAL PROCEDURAL CONTROLS ACCEPTABLE: YES
S PYO AG THROAT QL IA.RAPID: NEGATIVE
SARS-COV+SARS-COV-2 AG RESP QL IA.RAPID: DETECTED
TEST LOT EXPIRATION DATE: ABNORMAL
TEST LOT EXPIRATION DATE: NORMAL
TEST LOT NUMBER: ABNORMAL
TEST LOT NUMBER: NORMAL

## 2024-05-25 ASSESSMENT — PAIN SCALES - GENERAL: PAINLEVEL: 9

## 2024-05-25 ASSESSMENT — ENCOUNTER SYMPTOMS
VOICE CHANGE: 0
TROUBLE SWALLOWING: 0
UNEXPECTED WEIGHT CHANGE: 0
FATIGUE: 1
SINUS PRESSURE: 0
SHORTNESS OF BREATH: 0
WHEEZING: 0
APPETITE CHANGE: 0
SORE THROAT: 1
FEVER: 1
RHINORRHEA: 0
CHILLS: 0
SINUS PAIN: 0
COUGH: 0
DIAPHORESIS: 0
ACTIVITY CHANGE: 0

## 2024-09-14 ENCOUNTER — V-VISIT (OUTPATIENT)
Dept: URGENT CARE | Age: 69
End: 2024-09-14

## 2024-09-14 VITALS
HEART RATE: 69 BPM | TEMPERATURE: 98.4 F | DIASTOLIC BLOOD PRESSURE: 65 MMHG | HEIGHT: 61 IN | BODY MASS INDEX: 27.56 KG/M2 | RESPIRATION RATE: 16 BRPM | OXYGEN SATURATION: 96 % | SYSTOLIC BLOOD PRESSURE: 108 MMHG | WEIGHT: 146 LBS

## 2024-09-14 DIAGNOSIS — H10.33 ACUTE BACTERIAL CONJUNCTIVITIS OF BOTH EYES: ICD-10-CM

## 2024-09-14 DIAGNOSIS — H11.31 SUBCONJUNCTIVAL HEMORRHAGE OF RIGHT EYE: Primary | ICD-10-CM

## 2024-09-14 RX ORDER — METFORMIN HCL 500 MG
500 TABLET, EXTENDED RELEASE 24 HR ORAL
COMMUNITY
Start: 2024-08-08

## 2024-09-14 RX ORDER — POLYMYXIN B SULFATE AND TRIMETHOPRIM 1; 10000 MG/ML; [USP'U]/ML
SOLUTION OPHTHALMIC
Qty: 10 ML | Refills: 0 | Status: SHIPPED | OUTPATIENT
Start: 2024-09-14

## 2024-09-14 RX ORDER — BUPROPION HYDROCHLORIDE 150 MG/1
450 TABLET ORAL DAILY
COMMUNITY
Start: 2024-08-08

## 2024-09-14 ASSESSMENT — ENCOUNTER SYMPTOMS
EYE ITCHING: 1
LIGHT-HEADEDNESS: 0
CHEST TIGHTNESS: 0
PHOTOPHOBIA: 0
EYE DISCHARGE: 0
DIZZINESS: 0
SLEEP DISTURBANCE: 0
WEAKNESS: 0
SINUS PRESSURE: 0
VOMITING: 0
CONSTITUTIONAL NEGATIVE: 1
FATIGUE: 0
WHEEZING: 0
RHINORRHEA: 0
APPETITE CHANGE: 0
NAUSEA: 0
FEVER: 0
EYE REDNESS: 1
COUGH: 0
RESPIRATORY NEGATIVE: 1
EYE PAIN: 0
SORE THROAT: 0
HEADACHES: 0

## 2024-09-14 ASSESSMENT — VISUAL ACUITY
OD_CC: 20/60
OS_CC: 20/20

## 2024-09-14 ASSESSMENT — PAIN SCALES - GENERAL: PAINLEVEL: 0

## 2024-09-17 ENCOUNTER — TELEPHONE (OUTPATIENT)
Dept: FAMILY MEDICINE | Age: 69
End: 2024-09-17

## 2024-10-08 SDOH — ECONOMIC STABILITY: FOOD INSECURITY: WITHIN THE PAST 12 MONTHS, THE FOOD YOU BOUGHT JUST DIDN'T LAST AND YOU DIDN'T HAVE MONEY TO GET MORE.: NEVER TRUE

## 2024-10-08 SDOH — HEALTH STABILITY: PHYSICAL HEALTH: ON AVERAGE, HOW MANY DAYS PER WEEK DO YOU ENGAGE IN MODERATE TO STRENUOUS EXERCISE (LIKE A BRISK WALK)?: 0 DAYS

## 2024-10-08 SDOH — ECONOMIC STABILITY: INCOME INSECURITY: HOW HARD IS IT FOR YOU TO PAY FOR THE VERY BASICS LIKE FOOD, HOUSING, MEDICAL CARE, AND HEATING?: NOT HARD AT ALL

## 2024-10-08 SDOH — ECONOMIC STABILITY: FOOD INSECURITY: WITHIN THE PAST 12 MONTHS, YOU WORRIED THAT YOUR FOOD WOULD RUN OUT BEFORE YOU GOT MONEY TO BUY MORE.: NEVER TRUE

## 2024-10-08 ASSESSMENT — PATIENT HEALTH QUESTIONNAIRE - PHQ9
SUM OF ALL RESPONSES TO PHQ QUESTIONS 1-9: 0
1. LITTLE INTEREST OR PLEASURE IN DOING THINGS: NOT AT ALL
SUM OF ALL RESPONSES TO PHQ QUESTIONS 1-9: 0
SUM OF ALL RESPONSES TO PHQ9 QUESTIONS 1 & 2: 0
SUM OF ALL RESPONSES TO PHQ QUESTIONS 1-9: 0
SUM OF ALL RESPONSES TO PHQ QUESTIONS 1-9: 0
2. FEELING DOWN, DEPRESSED OR HOPELESS: NOT AT ALL

## 2024-10-08 ASSESSMENT — LIFESTYLE VARIABLES
HOW MANY STANDARD DRINKS CONTAINING ALCOHOL DO YOU HAVE ON A TYPICAL DAY: 1
HOW OFTEN DO YOU HAVE A DRINK CONTAINING ALCOHOL: 2
HOW MANY STANDARD DRINKS CONTAINING ALCOHOL DO YOU HAVE ON A TYPICAL DAY: 1 OR 2
HOW OFTEN DO YOU HAVE A DRINK CONTAINING ALCOHOL: MONTHLY OR LESS
HOW OFTEN DO YOU HAVE SIX OR MORE DRINKS ON ONE OCCASION: 1

## 2024-10-11 ENCOUNTER — OFFICE VISIT (OUTPATIENT)
Age: 69
End: 2024-10-11
Payer: MEDICARE

## 2024-10-11 VITALS
HEART RATE: 57 BPM | HEIGHT: 62 IN | RESPIRATION RATE: 16 BRPM | DIASTOLIC BLOOD PRESSURE: 82 MMHG | WEIGHT: 182.4 LBS | OXYGEN SATURATION: 98 % | SYSTOLIC BLOOD PRESSURE: 130 MMHG | BODY MASS INDEX: 33.57 KG/M2 | TEMPERATURE: 97.6 F

## 2024-10-11 DIAGNOSIS — R73.01 IMPAIRED FASTING GLUCOSE: ICD-10-CM

## 2024-10-11 DIAGNOSIS — D51.0 PERNICIOUS ANEMIA: ICD-10-CM

## 2024-10-11 DIAGNOSIS — E66.811 CLASS 1 OBESITY: ICD-10-CM

## 2024-10-11 DIAGNOSIS — I10 PRIMARY HYPERTENSION: ICD-10-CM

## 2024-10-11 DIAGNOSIS — E03.8 SUBCLINICAL HYPOTHYROIDISM: ICD-10-CM

## 2024-10-11 DIAGNOSIS — Z12.31 BREAST CANCER SCREENING BY MAMMOGRAM: ICD-10-CM

## 2024-10-11 DIAGNOSIS — M81.0 OSTEOPOROSIS, UNSPECIFIED OSTEOPOROSIS TYPE, UNSPECIFIED PATHOLOGICAL FRACTURE PRESENCE: ICD-10-CM

## 2024-10-11 DIAGNOSIS — Z98.84 HISTORY OF GASTRIC BYPASS: ICD-10-CM

## 2024-10-11 DIAGNOSIS — Z23 INFLUENZA VACCINE NEEDED: ICD-10-CM

## 2024-10-11 DIAGNOSIS — M19.072 PRIMARY OSTEOARTHRITIS OF BOTH FEET: ICD-10-CM

## 2024-10-11 DIAGNOSIS — M47.812 OSTEOARTHRITIS OF CERVICAL SPINE, UNSPECIFIED SPINAL OSTEOARTHRITIS COMPLICATION STATUS: ICD-10-CM

## 2024-10-11 DIAGNOSIS — F51.01 PRIMARY INSOMNIA: ICD-10-CM

## 2024-10-11 DIAGNOSIS — Z86.39 H/O HYPERPARATHYROIDISM: ICD-10-CM

## 2024-10-11 DIAGNOSIS — R79.89 ELEVATED TSH: ICD-10-CM

## 2024-10-11 DIAGNOSIS — M19.071 PRIMARY OSTEOARTHRITIS OF BOTH FEET: ICD-10-CM

## 2024-10-11 DIAGNOSIS — Z00.00 ENCOUNTER FOR SUBSEQUENT ANNUAL WELLNESS VISIT (AWV) IN MEDICARE PATIENT: Primary | ICD-10-CM

## 2024-10-11 LAB
ALBUMIN SERPL-MCNC: 4.1 G/DL (ref 3.5–5)
ALBUMIN/GLOB SERPL: 1.4 (ref 1.1–2.2)
ALP SERPL-CCNC: 57 U/L (ref 45–117)
ALT SERPL-CCNC: 28 U/L (ref 12–78)
ANION GAP SERPL CALC-SCNC: 3 MMOL/L (ref 2–12)
AST SERPL-CCNC: 20 U/L (ref 15–37)
BASOPHILS # BLD: 0.1 K/UL (ref 0–0.1)
BASOPHILS NFR BLD: 1 % (ref 0–1)
BILIRUB SERPL-MCNC: 0.6 MG/DL (ref 0.2–1)
BUN SERPL-MCNC: 18 MG/DL (ref 6–20)
BUN/CREAT SERPL: 33 (ref 12–20)
CALCIUM SERPL-MCNC: 9.8 MG/DL (ref 8.5–10.1)
CHLORIDE SERPL-SCNC: 107 MMOL/L (ref 97–108)
CO2 SERPL-SCNC: 29 MMOL/L (ref 21–32)
CREAT SERPL-MCNC: 0.54 MG/DL (ref 0.55–1.02)
DIFFERENTIAL METHOD BLD: NORMAL
EOSINOPHIL # BLD: 0.1 K/UL (ref 0–0.4)
EOSINOPHIL NFR BLD: 2 % (ref 0–7)
ERYTHROCYTE [DISTWIDTH] IN BLOOD BY AUTOMATED COUNT: 13.1 % (ref 11.5–14.5)
EST. AVERAGE GLUCOSE BLD GHB EST-MCNC: 111 MG/DL
GLOBULIN SER CALC-MCNC: 2.9 G/DL (ref 2–4)
GLUCOSE SERPL-MCNC: 97 MG/DL (ref 65–100)
HBA1C MFR BLD: 5.5 % (ref 4–5.6)
HCT VFR BLD AUTO: 43.2 % (ref 35–47)
HGB BLD-MCNC: 13.9 G/DL (ref 11.5–16)
IMM GRANULOCYTES # BLD AUTO: 0 K/UL (ref 0–0.04)
IMM GRANULOCYTES NFR BLD AUTO: 0 % (ref 0–0.5)
LYMPHOCYTES # BLD: 1.2 K/UL (ref 0.8–3.5)
LYMPHOCYTES NFR BLD: 22 % (ref 12–49)
MCH RBC QN AUTO: 28.6 PG (ref 26–34)
MCHC RBC AUTO-ENTMCNC: 32.2 G/DL (ref 30–36.5)
MCV RBC AUTO: 88.9 FL (ref 80–99)
MONOCYTES # BLD: 0.4 K/UL (ref 0–1)
MONOCYTES NFR BLD: 8 % (ref 5–13)
NEUTS SEG # BLD: 3.8 K/UL (ref 1.8–8)
NEUTS SEG NFR BLD: 67 % (ref 32–75)
NRBC # BLD: 0 K/UL (ref 0–0.01)
NRBC BLD-RTO: 0 PER 100 WBC
PLATELET # BLD AUTO: 262 K/UL (ref 150–400)
PMV BLD AUTO: 12.6 FL (ref 8.9–12.9)
POTASSIUM SERPL-SCNC: 4.2 MMOL/L (ref 3.5–5.1)
PROT SERPL-MCNC: 7 G/DL (ref 6.4–8.2)
RBC # BLD AUTO: 4.86 M/UL (ref 3.8–5.2)
SODIUM SERPL-SCNC: 139 MMOL/L (ref 136–145)
TSH SERPL DL<=0.05 MIU/L-ACNC: 2.67 UIU/ML (ref 0.36–3.74)
WBC # BLD AUTO: 5.7 K/UL (ref 3.6–11)

## 2024-10-11 PROCEDURE — 99213 OFFICE O/P EST LOW 20 MIN: CPT | Performed by: INTERNAL MEDICINE

## 2024-10-11 PROCEDURE — 90653 IIV ADJUVANT VACCINE IM: CPT | Performed by: INTERNAL MEDICINE

## 2024-10-11 RX ORDER — TIZANIDINE 2 MG/1
2 TABLET ORAL 2 TIMES DAILY
COMMUNITY
Start: 2024-10-03

## 2024-10-11 RX ORDER — LOSARTAN POTASSIUM AND HYDROCHLOROTHIAZIDE 12.5; 5 MG/1; MG/1
1 TABLET ORAL DAILY
Qty: 90 TABLET | Refills: 1 | Status: SHIPPED | OUTPATIENT
Start: 2024-10-11

## 2024-10-11 NOTE — PROGRESS NOTES
Medicare Annual Wellness Visit    Yue Barron is here for Medicare AWV    Assessment & Plan   Encounter for subsequent annual wellness visit (AWV) in Medicare patient  Primary hypertension  Assessment & Plan:  Controlled with current regimen, plan to continue  Orders:  -     losartan-hydroCHLOROthiazide (HYZAAR) 50-12.5 MG per tablet; Take 1 tablet by mouth daily, Disp-90 tablet, R-1Normal  Primary insomnia  Assessment & Plan:   She has tried melatonin, benadryl, trazodone in the past  Suggested trial of orexin inhibitor  New med dosing and potential side effects discussed   If not covered would try rozerem  Orders:  -     Suvorexant 10 MG TABS; Take 10 mg by mouth at bedtime for 180 days. Max Daily Amount: 10 mg, Disp-30 tablet, R-5Normal  Class 1 obesity  Assessment & Plan:  Discussed diet and exercise strategies for weight loss   Impaired fasting glucose  -     Hemoglobin A1C; Future  -     Comprehensive Metabolic Panel; Future  Osteoporosis, unspecified osteoporosis type, unspecified pathological fracture presence  Assessment & Plan:   S/p reclast 2016, again 10/2021, 11/2022, 11/2023  Now on holiday  DEXA late 2023 showed improvement into osteopenia range  Repeat 2-3 years  History of gastric bypass  Assessment & Plan:  2000 and revision in 2018 due to GERD   Orders:  -     Comprehensive Metabolic Panel; Future  -     CBC with Auto Differential; Future  Subclinical hypothyroidism  -     TSH; Future  Elevated TSH  -     TSH; Future  Pernicious anemia  -     CBC with Auto Differential; Future  Osteoarthritis of cervical spine, unspecified spinal osteoarthritis complication status  Assessment & Plan:  [facility], [facility], got some injections  H/O hyperparathyroidism  -     Comprehensive Metabolic Panel; Future  Primary osteoarthritis of both feet  Assessment & Plan:  Has seen specialist, not going to have surgery  Breast cancer screening by mammogram  -     [facility] MOISES DIGITAL SCREEN BILATERAL;

## 2024-10-11 NOTE — ASSESSMENT & PLAN NOTE
She has tried melatonin, benadryl, trazodone in the past  Suggested trial of orexin inhibitor  New med dosing and potential side effects discussed   If not covered would try rozerem

## 2024-10-14 ENCOUNTER — PATIENT MESSAGE (OUTPATIENT)
Age: 69
End: 2024-10-14

## 2024-10-14 DIAGNOSIS — F51.01 PRIMARY INSOMNIA: Primary | ICD-10-CM

## 2024-10-15 RX ORDER — RAMELTEON 8 MG/1
8 TABLET ORAL NIGHTLY PRN
Qty: 30 TABLET | Refills: 3 | Status: SHIPPED | OUTPATIENT
Start: 2024-10-15 | End: 2025-10-15

## 2024-11-21 ENCOUNTER — HOSPITAL ENCOUNTER (OUTPATIENT)
Age: 69
Discharge: HOME | End: 2024-11-21
Payer: MEDICARE

## 2024-11-21 VITALS — BODY MASS INDEX: 32.1 KG/M2 | HEIGHT: 61 IN | WEIGHT: 170 LBS

## 2024-11-21 DIAGNOSIS — Z12.31 BREAST CANCER SCREENING BY MAMMOGRAM: ICD-10-CM

## 2024-11-21 PROCEDURE — 77063 BREAST TOMOSYNTHESIS BI: CPT

## 2025-02-02 DIAGNOSIS — F51.01 PRIMARY INSOMNIA: ICD-10-CM

## 2025-02-05 RX ORDER — RAMELTEON 8 MG/1
8 TABLET ORAL NIGHTLY PRN
Qty: 30 TABLET | Refills: 0 | OUTPATIENT
Start: 2025-02-05 | End: 2026-02-05

## 2025-02-05 NOTE — TELEPHONE ENCOUNTER
Verified patient identity with two identifiers. Spoke with patient by phone and states will talk with JMS in April.  Not real good results with medication.  Has enough to get her to 4/11/2025 appt declines refill.  Ramila Remy LPN

## 2025-04-08 SDOH — ECONOMIC STABILITY: INCOME INSECURITY: IN THE LAST 12 MONTHS, WAS THERE A TIME WHEN YOU WERE NOT ABLE TO PAY THE MORTGAGE OR RENT ON TIME?: NO

## 2025-04-08 SDOH — ECONOMIC STABILITY: FOOD INSECURITY: WITHIN THE PAST 12 MONTHS, YOU WORRIED THAT YOUR FOOD WOULD RUN OUT BEFORE YOU GOT MONEY TO BUY MORE.: NEVER TRUE

## 2025-04-08 SDOH — ECONOMIC STABILITY: FOOD INSECURITY: WITHIN THE PAST 12 MONTHS, THE FOOD YOU BOUGHT JUST DIDN'T LAST AND YOU DIDN'T HAVE MONEY TO GET MORE.: NEVER TRUE

## 2025-04-08 SDOH — ECONOMIC STABILITY: TRANSPORTATION INSECURITY
IN THE PAST 12 MONTHS, HAS THE LACK OF TRANSPORTATION KEPT YOU FROM MEDICAL APPOINTMENTS OR FROM GETTING MEDICATIONS?: NO

## 2025-04-11 ENCOUNTER — OFFICE VISIT (OUTPATIENT)
Age: 70
End: 2025-04-11
Payer: MEDICARE

## 2025-04-11 VITALS
HEIGHT: 61 IN | RESPIRATION RATE: 16 BRPM | HEART RATE: 61 BPM | BODY MASS INDEX: 35.3 KG/M2 | SYSTOLIC BLOOD PRESSURE: 114 MMHG | TEMPERATURE: 97.5 F | OXYGEN SATURATION: 98 % | DIASTOLIC BLOOD PRESSURE: 75 MMHG | WEIGHT: 187 LBS

## 2025-04-11 DIAGNOSIS — E66.01 CLASS 2 SEVERE OBESITY DUE TO EXCESS CALORIES WITH SERIOUS COMORBIDITY AND BODY MASS INDEX (BMI) OF 35.0 TO 35.9 IN ADULT: ICD-10-CM

## 2025-04-11 DIAGNOSIS — Z86.2 HISTORY OF ANEMIA: ICD-10-CM

## 2025-04-11 DIAGNOSIS — F51.01 PRIMARY INSOMNIA: ICD-10-CM

## 2025-04-11 DIAGNOSIS — Z86.39 H/O HYPERPARATHYROIDISM: ICD-10-CM

## 2025-04-11 DIAGNOSIS — I10 PRIMARY HYPERTENSION: ICD-10-CM

## 2025-04-11 DIAGNOSIS — I10 PRIMARY HYPERTENSION: Primary | ICD-10-CM

## 2025-04-11 DIAGNOSIS — Z98.84 HISTORY OF GASTRIC BYPASS: ICD-10-CM

## 2025-04-11 DIAGNOSIS — M47.812 OSTEOARTHRITIS OF CERVICAL SPINE, UNSPECIFIED SPINAL OSTEOARTHRITIS COMPLICATION STATUS: ICD-10-CM

## 2025-04-11 DIAGNOSIS — E66.812 CLASS 2 SEVERE OBESITY DUE TO EXCESS CALORIES WITH SERIOUS COMORBIDITY AND BODY MASS INDEX (BMI) OF 35.0 TO 35.9 IN ADULT: ICD-10-CM

## 2025-04-11 PROBLEM — Z87.891 FORMER SMOKER: Status: ACTIVE | Noted: 2025-04-11

## 2025-04-11 LAB
25(OH)D3 SERPL-MCNC: 33 NG/ML (ref 30–100)
ANION GAP SERPL CALC-SCNC: 4 MMOL/L (ref 2–12)
BUN SERPL-MCNC: 10 MG/DL (ref 6–20)
BUN/CREAT SERPL: 18 (ref 12–20)
CALCIUM SERPL-MCNC: 9.3 MG/DL (ref 8.5–10.1)
CALCIUM SERPL-MCNC: 9.3 MG/DL (ref 8.5–10.1)
CHLORIDE SERPL-SCNC: 106 MMOL/L (ref 97–108)
CO2 SERPL-SCNC: 30 MMOL/L (ref 21–32)
CREAT SERPL-MCNC: 0.56 MG/DL (ref 0.55–1.02)
CREAT UR-MCNC: 42.1 MG/DL
FERRITIN SERPL-MCNC: 7 NG/ML (ref 8–252)
GLUCOSE SERPL-MCNC: 95 MG/DL (ref 65–100)
HCT VFR BLD AUTO: 40.1 % (ref 35–47)
HGB BLD-MCNC: 13 G/DL (ref 11.5–16)
IRON SATN MFR SERPL: 20 % (ref 20–50)
IRON SERPL-MCNC: 95 UG/DL (ref 35–150)
MICROALBUMIN UR-MCNC: 1.99 MG/DL
MICROALBUMIN/CREAT UR-RTO: 47 MG/G (ref 0–30)
POTASSIUM SERPL-SCNC: 4.2 MMOL/L (ref 3.5–5.1)
PTH-INTACT SERPL-MCNC: 56.9 PG/ML (ref 18.4–88)
SODIUM SERPL-SCNC: 140 MMOL/L (ref 136–145)
SPECIMEN HOLD: NORMAL
TIBC SERPL-MCNC: 467 UG/DL (ref 250–450)
VIT B12 SERPL-MCNC: 589 PG/ML (ref 193–986)

## 2025-04-11 PROCEDURE — G8399 PT W/DXA RESULTS DOCUMENT: HCPCS | Performed by: INTERNAL MEDICINE

## 2025-04-11 PROCEDURE — G8427 DOCREV CUR MEDS BY ELIG CLIN: HCPCS | Performed by: INTERNAL MEDICINE

## 2025-04-11 PROCEDURE — 1126F AMNT PAIN NOTED NONE PRSNT: CPT | Performed by: INTERNAL MEDICINE

## 2025-04-11 PROCEDURE — 1036F TOBACCO NON-USER: CPT | Performed by: INTERNAL MEDICINE

## 2025-04-11 PROCEDURE — G8417 CALC BMI ABV UP PARAM F/U: HCPCS | Performed by: INTERNAL MEDICINE

## 2025-04-11 PROCEDURE — 3017F COLORECTAL CA SCREEN DOC REV: CPT | Performed by: INTERNAL MEDICINE

## 2025-04-11 PROCEDURE — 3078F DIAST BP <80 MM HG: CPT | Performed by: INTERNAL MEDICINE

## 2025-04-11 PROCEDURE — 1123F ACP DISCUSS/DSCN MKR DOCD: CPT | Performed by: INTERNAL MEDICINE

## 2025-04-11 PROCEDURE — 1159F MED LIST DOCD IN RCRD: CPT | Performed by: INTERNAL MEDICINE

## 2025-04-11 PROCEDURE — 99214 OFFICE O/P EST MOD 30 MIN: CPT | Performed by: INTERNAL MEDICINE

## 2025-04-11 PROCEDURE — 3074F SYST BP LT 130 MM HG: CPT | Performed by: INTERNAL MEDICINE

## 2025-04-11 PROCEDURE — 1090F PRES/ABSN URINE INCON ASSESS: CPT | Performed by: INTERNAL MEDICINE

## 2025-04-11 RX ORDER — LOSARTAN POTASSIUM AND HYDROCHLOROTHIAZIDE 12.5; 5 MG/1; MG/1
1 TABLET ORAL DAILY
Qty: 90 TABLET | Refills: 1 | Status: SHIPPED | OUTPATIENT
Start: 2025-04-11

## 2025-04-11 RX ORDER — PHENOL 1.4 %
AEROSOL, SPRAY (ML) MUCOUS MEMBRANE
COMMUNITY

## 2025-04-11 RX ORDER — RAMELTEON 8 MG/1
8 TABLET ORAL NIGHTLY PRN
Qty: 90 TABLET | Refills: 1 | Status: SHIPPED | OUTPATIENT
Start: 2025-04-11 | End: 2026-04-11

## 2025-04-11 ASSESSMENT — PATIENT HEALTH QUESTIONNAIRE - PHQ9
SUM OF ALL RESPONSES TO PHQ QUESTIONS 1-9: 0
SUM OF ALL RESPONSES TO PHQ QUESTIONS 1-9: 0
2. FEELING DOWN, DEPRESSED OR HOPELESS: NOT AT ALL
SUM OF ALL RESPONSES TO PHQ QUESTIONS 1-9: 0
1. LITTLE INTEREST OR PLEASURE IN DOING THINGS: NOT AT ALL
SUM OF ALL RESPONSES TO PHQ QUESTIONS 1-9: 0

## 2025-04-11 ASSESSMENT — ENCOUNTER SYMPTOMS
COUGH: 0
ABDOMINAL PAIN: 0
SHORTNESS OF BREATH: 0
BACK PAIN: 1

## 2025-04-11 NOTE — ASSESSMENT & PLAN NOTE
She is taking rozerem and melatonin PRN and this is better than other things she has tried, no adverse effects noted, OK to continue- can try adding magnesium as well

## 2025-04-11 NOTE — PROGRESS NOTES
4/11/2025    Yue Barron 1955 is a 69 y.o. year old female established patient,   here for evaluation of the following chief complaint(s):  Chief Complaint   Patient presents with    Follow-up    Hypertension           ASSESSMENT/PLAN:  Below is the assessment and plan developed based on review of pertinent history, physical exam, labs, studies, and medications.    1. Primary hypertension  Assessment & Plan:  Controlled with current regimen, plan to continue  Orders:  -     Basic Metabolic Panel; Future  -     Albumin/Creatinine Ratio, Urine; Future  -     losartan-hydroCHLOROthiazide (HYZAAR) 50-12.5 MG per tablet; Take 1 tablet by mouth daily, Disp-90 tablet, R-1Normal  2. Primary insomnia  Assessment & Plan:   She is taking rozerem and melatonin PRN and this is better than other things she has tried, no adverse effects noted, OK to continue- can try adding magnesium as well  Orders:  -     ramelteon (ROZEREM) 8 MG tablet; Take 1 tablet by mouth nightly as needed for Sleep, Disp-90 tablet, R-1Normal  3. H/O hyperparathyroidism  Assessment & Plan:  H/o adenoma resection then levels improved  Continue monitoring  Orders:  -     PTH, Intact; Future  4. History of gastric bypass  Assessment & Plan:  2000 and revision in 2018 due to GERD   Will continue micronutrient monitoring  Orders:  -     Vitamin D 25 Hydroxy; Future  -     Vitamin B12; Future  -     Iron and TIBC; Future  -     Ferritin; Future  -     Hemoglobin and Hematocrit; Future  -     PTH, Intact; Future  5. Osteoarthritis of cervical spine, unspecified spinal osteoarthritis complication status  Assessment & Plan:  [facility] tried [facility], got some injections with Dr Smith that was helpful  6. History of anemia  -     Hemoglobin and Hematocrit; Future  7. Class 2 severe obesity due to excess calories with serious comorbidity and body mass index (BMI) of 35.0 to 35.9 in adult  Assessment & Plan:  Discussed diet and exercise strategies for  Specialty Care (Immediate)...

## 2025-04-14 ENCOUNTER — RESULTS FOLLOW-UP (OUTPATIENT)
Age: 70
End: 2025-04-14

## 2025-04-17 ENCOUNTER — TELEPHONE (OUTPATIENT)
Age: 70
End: 2025-04-17

## 2025-04-17 NOTE — TELEPHONE ENCOUNTER
Called pt, ID x 2. Advised per Dr. Shahid's note regarding recent lab results and recommendations. Pt verbalized understanding.

## 2025-04-17 NOTE — TELEPHONE ENCOUNTER
Reason for call:  pt called and would like to have someone call her to discuss her recent labs.    Is this a new problem: Yes    Date of last appointment:  4/11/2025     Can we respond via opvizor: No    Best call back number:     Yue Barron \"Yuliet\" (Self) [phone] (Mobile)

## 2025-05-15 NOTE — PROGRESS NOTES
Endocrinology Visit    Chief Complaint: hypercalcemia    History of Present Illness:  Deysi Keller is a 61 y.o. female with h/o obesity s/p gastric bypass and osteoporosis who returns for hypercalcemia. I saw her in initial consultation in February at which time I started a work-up and confirmed primary hyperparathyroidism. Sestamibi failed to identify a candidate but given her osteoporosis, I referred her to Dr Morgan Ulloa for 4 gland exploration. She underwent surgery on 5/12/17 and the right inferior parathyroid gland was removed. Intra-op PTH decreased from 201 to 12. She reports feeling much better since the surgery, feels her energy level and overall wellbeing is improved. In the interim, she had diverticulitis and required surgery for revision of her gastric bypass. She was hospitalized in March. Weight is down 30 lbs since her last visit. Calcium levels have been stable, and her gastric bypass clinic just ordered f/u labs for her to do. She denies face twitching, increase in muscle cramps, or wm-oral tingling. She does have an occasional \"Charley horse\" at night. She has a history of lactose intolerance so rarely eats ice cream or milk. Has cheese on occasion. She has a history of osteoporosis based on a DXA in 2014 and received her first infusion of Reclast in May 2016. Denies s/e from the medication but says it was over $400 for the infusion. She did have problems with acid reflux but this seems to be resolved after her revision. Cannot remember if she tried Fosamax before Reclast or not. She did have a recent traumatic toe fracture in October - says this is healed now. She has fractured both shoulders (possibly humeral) s/p ORIF after mechanical falls. Risk factors for osteoporosis include premature menopause - underwent a DEYSI in her early 35s for early stage cervical cancer. She is having two molars extracted later today, but no other invasive dental procedures are planned.      Review of Systems: as above, otherwise 7 pt review is negative    Problem List:  Patient Active Problem List   Diagnosis Code    Insomnia, unspecified G47.00    Pernicious anemia D51.0    Vitamin D deficiency E55.9    Reflux esophagitis K21.0    Gastric ulcer K25.9    Osteoporosis M81.0    Iron deficiency anemia, unspecified D50.9    Onychomycosis B35.1    Active advance directive on file Z78.9    Hypercalcemia E83.52    H/O hyperparathyroidism Z86.39    Broken toe S92.919A    B12 deficiency E53.8    Gastroesophageal reflux disease without esophagitis K21.9    Diverticulosis K57.90       Past Medical History:    Past Medical History:   Diagnosis Date    Arthritis     OSTEO    Broken toe 2017    right #5    Chronic pain     LT. ARM    GERD (gastroesophageal reflux disease)     History of blood transfusion 1995    [place], NO REACTION; needed transfusion from surgery-ovarian cyst and hit artery per pt.  Morbid obesity ([address] 3/23/2011    PUD (peptic ulcer disease)        Past Surgical History:  Past Surgical History:   Procedure Laterality Date    BIOPSY/EXCISION, LYMPH NODE(S)      BREAST SURGERY PROCEDURE UNLISTED Left     LT. TUMOR BENIGN REMOVED AGE 18    COLONOSCOPY Left 3/20/2018    COLONOSCOPY performed by Johanna Kenny MD at [address] [place] [address] UNLISTED      HX ABDOMINOPLASTY  2006    HX ADENOIDECTOMY      HX CARPAL TUNNEL RELEASE  2001    RIGHT    HX CHOLECYSTECTOMY  2000    HX CHOLECYSTECTOMY  2000    at time of gastric bypass     HX COLONOSCOPY      2014, due 17 vs 19    HX GASTRIC BYPASS  12-13-00    dr. Mallory Dillon HX GI  2016    endoscopy, colonoscopy    HX HEENT  1995    TUMOR REMOVED neck (benign)    HX HEENT  05/2017    PARATHYROID EXCISION    HX HYSTERECTOMY  1993    HX ORTHOPAEDIC      CYCST REMOVED BASE OF SPINE AGE 23    HX ORTHOPAEDIC Right 2004,2009    RIGHT ANKLE FX    HX ORTHOPAEDIC Left 4/2012    LT.  SHOULDER AND HUMERUS FX, SCREW AND PLATE ( has been removed)    HX ORTHOPAEDIC Right 2014    shoulder fx, orif    HX ORTHOPAEDIC Right     CARPAL TUNNEL    HX OTHER SURGICAL      TUMOR REMOVED RIGHT UPPER FLANK AREA    HX OTHER SURGICAL  05/08/2018    resection of gastric pouch by Dr Lauren Ascencio HX TONSILLECTOMY      HX [address]      right ankle cartilage    LEG/ANKLE SURGERY PROC UNLISTED  2009     ankle       Social History:  Social History     Social History    Marital status:      Spouse name: N/A    Number of children: 1    Years of education: N/A     Occupational History    computer       Social History Main Topics    Smoking status: Former Smoker     Packs/day: 0.50     Years: 7.00     Types: Cigarettes     Quit date: 3/18/2018    Smokeless tobacco: Never Used    Alcohol use 2.4 oz/week     1 Glasses of wine, 3 Cans of beer per week      Comment: 6 PACK/every 2 weeks    Drug use: No    Sexual activity: Not on file     Other Topics Concern    Not on file     Social History Narrative    In the home with sister/spouse     Adult son       Family History:  Family History   Problem Relation Age of Onset    Other Mother      hypoglycemia, obese,Clyde corey syndrome    Cancer Mother      LUNG    Lung Disease Mother     Obesity Mother     Cancer Father      GENERALIZED    Other Father      [facility] corey syndrome gene trait    Asthma Sister     Lung Disease Sister     Hypertension Brother     Lung Disease Brother     Diabetes Brother     Thyroid Disease Brother     Other Brother      Clyde corey syndrome    Cancer Brother      COLON    Heart Disease Maternal Aunt     Cancer Maternal Aunt     Cancer Maternal Uncle      UNKNOWN    Cancer Paternal Uncle     Heart Disease Maternal Grandmother     Heart Disease Maternal Grandfather     Heart Disease Paternal Grandmother     Heart Disease Paternal Grandfather  Cancer Paternal Uncle     Lung Disease Brother     Cancer Maternal Uncle     Cancer Paternal Aunt      BREAST    Anesth Problems Neg Hx        Medications:     Current Outpatient Prescriptions:     mv-mn-iron-FA-Ca carb-vit K (WOMEN'S MULTIVITAMIN) 18 mg iron-400 mcg-500 mg tab, Take 1 Tab by mouth daily. , Disp: , Rfl:     DOCUSATE SODIUM (STOOL SOFTENER PO), Take 2 Tabs by mouth daily. , Disp: , Rfl:     cholecalciferol (VITAMIN D3) 1,000 unit tablet, Take 1 Tab by mouth daily. , Disp: 90 Tab, Rfl: 3    calcium carbonate (CALTREX) 600 mg calcium (1,500 mg) tablet, Take 1 Tab by mouth daily. , Disp: 90 Tab, Rfl: 3    psyllium (METAMUCIL) 0.52 gram capsule, Take 1 Cap by mouth daily. , Disp: 90 Cap, Rfl: 3    MELATONIN PO, Take 5 mg by mouth nightly as needed. , Disp: , Rfl:     cyanocobalamin (VITAMIN B-12) 1,000 mcg tablet, Take 1,000 mcg by mouth daily. , Disp: , Rfl:     esomeprazole (NEXIUM) 40 mg capsule, Take 40 mg by mouth as needed. , Disp: , Rfl:     Allergies: Allergies   Allergen Reactions    Codeine Hives     Tolerates Dilaudid       Physical Examination:  Visit Vitals    /70    Pulse (!) 58    Ht 5' 4\" (1.626 m)    Wt 154 lb 12.8 oz (70.2 kg)    BMI 26.57 kg/m2     Gen: no acute distress  HEENT: mucous membranes moist  Thyroid: well healed incision at base of neck, no thyromegaly  CAD: normal rate, regular rhythm. No murmur rubs or gallops  PULM: clear to ausculation, no wheezes, rhonchis or rales.   EXT: no clubbing, cyanosis or edema  Neuro: grossly non focal, normal DTRs  Psych: pleasant, good insight into medical hx  Skin: warm, dry, no rashes    Clinical Data Review:    Lab Results   Component Value Date/Time    Calcium 8.5 05/09/2018 03:10 AM    Phosphorus 3.8 06/20/2017 03:34 PM    PTH, Intact 26 12/04/2017 12:00 AM      Lab Results   Component Value Date/Time    Vitamin D 25-Hydroxy 14.2 (L) 04/15/2011 10:35 AM    VITAMIN D, 25-HYDROXY 25.7 (L) 12/04/2017 12:00 AM       Lab Results   Component Value Date/Time    TSH 4.32 (H) 03/19/2018 05:29 AM    T4, Free 1.09 02/21/2017 02:39 PM      NM Parathyroid Scan  FINDINGS:  The initial images demonstrate physiologic tracer uptake in the salivary glands,  thyroid, and myocardium.     The delayed images demonstrate no abnormal tracer activity in the neck or chest  to suggest parathyroid adenoma.     IMPRESSION:  No evidence of parathyroid adenoma. Thyroid/Parathyroid US  FINDINGS: The right thyroid lobe measures 4.4 x 2.0 x 1.5 cm. The left thyroid  lobe measures 4.1 x 1.6 x 1.1 cm. The isthmus measures 0.2 cm in AP thickness.     The thyroid gland is normal in size, echogenicity, and vascularity. No thyroid  nodule or calcification is demonstrated. No normal or abnormal parathyroid gland  is identified.     IMPRESSION:   Normal thyroid ultrasound. Pathology May 2017  FINAL PATHOLOGIC DIAGNOSIS   Parathyroid, right inferior, parathyroidectomy:   Hypercellular parathyroid tissue consistent with adenoma.      DXA June 2018  Bone Mineral Density      Indication:  h/o osteoporosis and hyperPTH s/p parathyroidectomy May 2017 - eval  for interval changes in BMD   Age: 61  Sex: Female.     Menopause status: postmenopausal.  Hormone replacement therapy: None      Number of falls in the past year:   1   Risk factors for osteoporosis:  Multiple fractures, hyperparathyroidism, Nexium         Current medication for osteoporosis: Vitamin D.      Comparison: 2014 outside study      Technique: Imaging was performed on the [employer].      Excluded sites: 0      Findings:      Femoral Neck Left:  Bone mineral density (gm/cm2):  0.77  % of peak bone mass:  74  % for age matched controls:  80  T-score:  -1.9  Z-score:  -0.7     Femoral Neck Right:  Bone mineral density (gm/cm2):  0.773  % of peak bone mass:  74  % for age matched controls:  80  T-score:  -1.9  Z-score:  -0.7      Total Hip Left:  Bone mineral density (gm/cm2):  0.801  % of peak bone mass:  80  % for age matched controls:  80  T-score:  -1.6  Z-score:  -0.8     Total Hip Right:  Bone mineral density (gm/cm2):  0.808  % of peak bone mass:  80  % for age matched controls:  90  T-score:  -1.6  Z-score:  -0.7     Lumbar Spine:  L1-4   Bone mineral density (gm/cm2):  0.779  % of peak bone mass:  66  % for age matched controls:  75  T-score:  -3.4  Z-score:  -2.2     33% Radius Left:  Bone mineral density (gm/cm2):  0.632  % of peak bone mass:  71  % for age matched controls:  70  T-score:  -2.9  Z-score:  -1.7     IMPRESSION   This patient is osteoporotic using the World Health Organization criteria  The patient has had a prior study. While no formal comparison is feasible, the  following observations are noted: There has been a significant decrease in the  lumbar spine and left hip. The patient is now in the osteoporotic category. This  can serve as a new baseline study. Assessment and Plan:  Lawyer Gomez is a 61 y.o. female here for h/o hypercalcemia due to primary hyperparathyroidism s/p right inferior parathyroidectomy in May 2017. The surgical indication was metabolic bone disease (DXA c/w primary hyperparathyroidism given the most negative T-score at the distal 1/3 radius). Post-op, I recommended holding additional zolindronic acid treatments due to potential for hungry bone syndrome. 1 year f/u DXA shows persistent evidence of osteoporosis, thus we discussed resuming pharmacologic therapy. We could resume Reclast but she says this is cost-prohibitive. Will trial weekly alendronate since her acid reflux has subsided. Advised her not to start this until given clearance by her dentist (going for a visit today) and after 25OHD is resulted. She will notify me if she has side effects from this medication. If so, an alternative would be raloxifene since T-score is lowest in the spine (but Evista does not reduce the risk of nonvertebral fractures).  For now, continue cholecalciferol 1000 IU daily and continue calcium carbonate 600mg daily. Recheck TSH since inpt value was slightly abnormal.     I spent 25 minutes with the patient today and > 50% of the time was spent counseling the patient about causes of hypercalcemia, work-up and potential treatment. She will return in 6 months time. Thank you for the opportunity to participate in this patient's care.     Courtney Harrison MD  [facility]  [facility] What Type Of Note Output Would You Prefer (Optional)?: Standard Output How Severe Are Your Spot(S)?: mild Have Your Spot(S) Been Treated In The Past?: has not been treated Hpi Title: Evaluation of Skin Lesions Additional History: Patient last FBSE 05/24.

## 2025-08-14 ENCOUNTER — TELEPHONE (OUTPATIENT)
Age: 70
End: 2025-08-14

## (undated) DEVICE — PROBE 8225101 5PK STD PRASS FL TIP ROHS

## (undated) DEVICE — Device

## (undated) DEVICE — INSULATED BLADE ELECTRODE: Brand: EDGE

## (undated) DEVICE — SUTURE SZ 0 27IN 5/8 CIR UR-6  TAPER PT VIOLET ABSRB VICRYL J603H

## (undated) DEVICE — SHEAR HARMONIC 5MMX45CM -- ACE 7+

## (undated) DEVICE — SPONGE: SPECIALTY PEANUT XR 100/CS: Brand: MEDICAL ACTION INDUSTRIES

## (undated) DEVICE — TROCAR SITE CLOSURE DEVICE: Brand: ENDO CLOSE

## (undated) DEVICE — WOUND RETRACTOR AND PROTECTOR: Brand: ALEXIS WOUND PROTECTOR-RETRACTOR

## (undated) DEVICE — (D)PREP SKN CHLRAPRP APPL 26ML -- CONVERT TO ITEM 371833

## (undated) DEVICE — Z DISCONTINUED USE 2751540 TUBING IRRIG L10IN DISP PMP ENDOGATOR

## (undated) DEVICE — SLIM BODY SKIN STAPLER: Brand: APPOSE ULC

## (undated) DEVICE — KENDALL SCD EXPRESS SLEEVES, KNEE LENGTH, MEDIUM: Brand: KENDALL SCD

## (undated) DEVICE — SUTURE MCRYL SZ 4-0 L27IN ABSRB UD L19MM PS-2 1/2 CIR PRIM Y426H

## (undated) DEVICE — SYRINGE MED 20ML STD CLR PLAS LUERLOCK TIP N CTRL DISP

## (undated) DEVICE — BASIN EMSIS 16OZ GRAPHITE PLAS KID SHP MOLD GRAD FOR ORAL

## (undated) DEVICE — POWER SHELL: Brand: SIGNIA

## (undated) DEVICE — SUTURING DEVICE: Brand: ENDO STITCH

## (undated) DEVICE — (D)SYR 10ML 1/5ML GRAD NSAF -- PKGING CHANGE USE ITEM 338027

## (undated) DEVICE — SHEAR HARMONIC FOCUS OEM 9CM --

## (undated) DEVICE — MEDI-VAC NON-CONDUCTIVE SUCTION TUBING: Brand: CARDINAL HEALTH

## (undated) DEVICE — INTENDED FOR TISSUE SEPARATION, AND OTHER PROCEDURES THAT REQUIRE A SHARP SURGICAL BLADE TO PUNCTURE OR CUT.: Brand: BARD-PARKER ® CARBON RIB-BACK BLADES

## (undated) DEVICE — STERILE POLYISOPRENE POWDER-FREE SURGICAL GLOVES WITH EMOLLIENT COATING: Brand: PROTEXIS

## (undated) DEVICE — ENDO CARRY-ON PROCEDURE KIT INCLUDES ENZYMATIC SPONGE, GAUZE, BIOHAZARD LABEL, TRAY, LUBRICANT, DIRTY SCOPE LABEL, WATER LABEL, TRAY, DRAWSTRING PAD, AND DEFENDO 4-PIECE KIT.: Brand: ENDO CARRY-ON PROCEDURE KIT

## (undated) DEVICE — ROCKER SWITCH PENCIL BLADE ELECTRODE, HOLSTER: Brand: EDGE

## (undated) DEVICE — KENDALL RADIOLUCENT FOAM MONITORING ELECTRODE -RECTANGULAR SHAPE: Brand: KENDALL

## (undated) DEVICE — REM POLYHESIVE ADULT PATIENT RETURN ELECTRODE: Brand: VALLEYLAB

## (undated) DEVICE — CONNECTOR TBNG AUX H2O JET DISP FOR OLY 160/180 SER

## (undated) DEVICE — COVER US PRB W12XL244CM FLD IORT STR TIP

## (undated) DEVICE — SUT ETHLN 2-0 18IN FS BLK --

## (undated) DEVICE — Z DISCONTINUED GLOVE SURG SZ 7 L12IN FNGR THK13MIL WHT ISOLEX POLYISOPRENE

## (undated) DEVICE — MAGNETIC DRAPE: Brand: DEVON

## (undated) DEVICE — KIT IV STRT W CHLORAPREP PD 1ML

## (undated) DEVICE — INFECTION CONTROL KIT SYS

## (undated) DEVICE — TRAP SURG QUAD PARABOLA SLOT DSGN SFTY SCRN TRAPEASE

## (undated) DEVICE — AGENT HEMSTAT W2XL3IN OXIDIZED REGENERATED CELOS ABSRB

## (undated) DEVICE — SOLIDIFIER FLUID 3000 CC ABSORB

## (undated) DEVICE — BAG BELONG PT PERS CLEAR HANDL

## (undated) DEVICE — 1200 GUARD II KIT W/5MM TUBE W/O VAC TUBE: Brand: GUARDIAN

## (undated) DEVICE — SUT SLK 3-0 30IN SH BLK --

## (undated) DEVICE — BLADELESS TROCAR WITH FIXATION CANNULA: Brand: VERSAPORT PLUS

## (undated) DEVICE — Z DISCONTINUED NO SUB IDED SET EXTN W/ 4 W STPCOCK M SPIN LOK 36IN

## (undated) DEVICE — TOWEL SURG W17XL27IN STD BLU COT NONFENESTRATED PREWASHED

## (undated) DEVICE — INTELLIGENT RELOAD: Brand: TRI-STAPLE 2.0

## (undated) DEVICE — SURGICAL PROCEDURE PACK BASIN MAJ SET CUST NO CAUT

## (undated) DEVICE — SYRINGE 50ML E/T

## (undated) DEVICE — GOWN,SIRUS,FABRNF,XL,20/CS: Brand: MEDLINE

## (undated) DEVICE — FORCEPS BX L240CM JAW DIA2.8MM L CAP W/ NDL MIC MESH TOOTH

## (undated) DEVICE — BLACK INTELLIGENT RELOAD: Brand: TRI-STAPLE 2.0

## (undated) DEVICE — DEVON™ KNEE AND BODY STRAP 60" X 3" (1.5 M X 7.6 CM): Brand: DEVON

## (undated) DEVICE — NEEDLE HYPO 18GA L1.5IN PNK S STL HUB POLYPR SHLD REG BVL

## (undated) DEVICE — REINFORCED INTELLIGENT RELOAD, FOR USE WITH SIGNIA STAPLING SYSTEM: Brand: TRI-STAPLE 2.0

## (undated) DEVICE — BAG SPEC BIOHZD LF 2MIL 6X10IN -- CONVERT TO ITEM 357326

## (undated) DEVICE — 3000CC GUARDIAN II: Brand: GUARDIAN

## (undated) DEVICE — APPLICATOR BNDG 1MM ADH PREMIERPRO EXOFIN

## (undated) DEVICE — X-RAY SPONGES,16 PLY: Brand: DERMACEA

## (undated) DEVICE — STAPLER INT AD L L25MM DIA12MM INTLUMN WHT TI CIR CUT 2 ROW

## (undated) DEVICE — VISUALIZATION SYSTEM: Brand: CLEARIFY

## (undated) DEVICE — SURGICAL PROCEDURE KIT GEN LAPAROSCOPY LF

## (undated) DEVICE — BLADELESS OPTICAL TROCAR WITH FIXATION CANNULA: Brand: VERSAONE

## (undated) DEVICE — SOLUTION IV 1000ML 0.9% SOD CHL

## (undated) DEVICE — SOLUTION IRRIG 1000ML H2O STRL BLT

## (undated) DEVICE — TRAY CATH OD16FR SIL URIN M STATLOK STBL DEV SURSTP

## (undated) DEVICE — SPECIMEN RETRIEVAL POUCH: Brand: ENDO CATCH GOLD

## (undated) DEVICE — ARTICULATING RELOAD WITH TRI-STAPLE TECHNOLOGY: Brand: ENDO GIA

## (undated) DEVICE — ELECTRODE ES 36CM LAP FLAT L HK COAT DISP CLEANCOAT

## (undated) DEVICE — STAPLER INT 60 UNIV PUR W/ TRI-STAPLE TECHNOLOGY ULT FOR

## (undated) DEVICE — NEEDLE HYPO 22GA L1.5IN BLK S STL HUB POLYPR SHLD REG BVL

## (undated) DEVICE — CATH IV AUTOGRD BC BLU 22GA 25 -- INSYTE

## (undated) DEVICE — BIPOLAR FORCEPS CORD,BANANA LEADS: Brand: VALLEYLAB

## (undated) DEVICE — TUBING INSUFLTN 10FT LUER -- CONVERT TO ITEM 368568

## (undated) DEVICE — SET ADMIN 16ML TBNG L100IN 2 Y INJ SITE IV PIGGY BK DISP

## (undated) DEVICE — QUILTED PREMIUM COMFORT UNDERPAD,EXTRA HEAVY: Brand: WINGS

## (undated) DEVICE — EMG TUBE 8229707 NIM TRIVANTAGE 7.0MM ID: Brand: NIM TRIVANTAGE™

## (undated) DEVICE — (D)STRIP SKN CLSR 0.5X4IN WHT --

## (undated) DEVICE — SUTURE VCRL SZ 3-0 L27IN ABSRB UD L26MM SH 1/2 CIR J416H

## (undated) DEVICE — CLICKLINE SCISSORS INSERT: Brand: CLICKLINE

## (undated) DEVICE — DRAIN CHN 19FR L0.25MM DIA6.3MM SIL RND HUBLESS FULL FLUT

## (undated) DEVICE — UNIVERSAL FIXATION CANNULA: Brand: VERSAONE

## (undated) DEVICE — SNARE ENDOSCP M L240CM W27MM SHTH DIA2.4MM CHN 2.8MM OVL

## (undated) DEVICE — FILTER SMK EVAC FLO CLR MEGADYNE

## (undated) DEVICE — AIRLIFE™ U/CONNECT-IT OXYGEN TUBING 7 FEET (2.1 M) CRUSH-RESISTANT OXYGEN TUBING, VINYL TIPPED: Brand: AIRLIFE™

## (undated) DEVICE — BW-412T DISP COMBO CLEANING BRUSH: Brand: SINGLE USE COMBINATION CLEANING BRUSH

## (undated) DEVICE — PACK,EENT,TURBAN DRAPE,PK II: Brand: MEDLINE

## (undated) DEVICE — HOOK RETRCT L5MM E SHRP SELF RET SYS LONE STAR

## (undated) DEVICE — MASTISOL ADHESIVE LIQ 2/3ML

## (undated) DEVICE — CONTAINER SPEC 20 ML LID NEUT BUFF FORMALIN 10 % POLYPR STS